# Patient Record
Sex: FEMALE | Race: BLACK OR AFRICAN AMERICAN | NOT HISPANIC OR LATINO | Employment: OTHER | URBAN - METROPOLITAN AREA
[De-identification: names, ages, dates, MRNs, and addresses within clinical notes are randomized per-mention and may not be internally consistent; named-entity substitution may affect disease eponyms.]

---

## 2016-03-14 LAB
PAP RECOMMENDATION EXT: NORMAL
PAP SMEAR: NORMAL

## 2017-01-17 ENCOUNTER — HISTORICAL (OUTPATIENT)
Dept: INTERNAL MEDICINE | Facility: CLINIC | Age: 57
End: 2017-01-17

## 2017-01-30 ENCOUNTER — HISTORICAL (OUTPATIENT)
Dept: RADIOLOGY | Facility: HOSPITAL | Age: 57
End: 2017-01-30

## 2017-03-23 ENCOUNTER — HISTORICAL (OUTPATIENT)
Dept: INTERNAL MEDICINE | Facility: CLINIC | Age: 57
End: 2017-03-23

## 2017-12-11 ENCOUNTER — HISTORICAL (OUTPATIENT)
Dept: INTERNAL MEDICINE | Facility: CLINIC | Age: 57
End: 2017-12-11

## 2017-12-11 LAB — DEPRECATED CALCIDIOL+CALCIFEROL SERPL-MC: 25.23 NG/ML (ref 30–80)

## 2017-12-22 ENCOUNTER — HISTORICAL (OUTPATIENT)
Dept: RADIOLOGY | Facility: HOSPITAL | Age: 57
End: 2017-12-22

## 2017-12-22 LAB
BUN SERPL-MCNC: 13 MG/DL (ref 7–18)
CALCIUM SERPL-MCNC: 9.5 MG/DL (ref 8.5–10.1)
CHLORIDE SERPL-SCNC: 104 MMOL/L (ref 98–107)
CO2 SERPL-SCNC: 30 MMOL/L (ref 21–32)
CREAT SERPL-MCNC: 1 MG/DL (ref 0.6–1.3)
GLUCOSE SERPL-MCNC: 95 MG/DL (ref 74–106)
POTASSIUM SERPL-SCNC: 4 MMOL/L (ref 3.5–5.1)
SODIUM SERPL-SCNC: 140 MMOL/L (ref 136–145)

## 2018-09-25 ENCOUNTER — HISTORICAL (OUTPATIENT)
Dept: RADIOLOGY | Facility: HOSPITAL | Age: 58
End: 2018-09-25

## 2018-09-25 LAB
CHOLEST SERPL-MCNC: 133 MG/DL
CHOLEST/HDLC SERPL: 2.9 {RATIO} (ref 0–4.4)
HDLC SERPL-MCNC: 46 MG/DL
LDLC SERPL CALC-MCNC: 67 MG/DL (ref 0–130)
TRIGL SERPL-MCNC: 99 MG/DL
VLDLC SERPL CALC-MCNC: 20 MG/DL

## 2018-10-01 ENCOUNTER — HISTORICAL (OUTPATIENT)
Dept: ADMINISTRATIVE | Facility: HOSPITAL | Age: 58
End: 2018-10-01

## 2018-10-01 LAB — FSH SERPL-ACNC: 78.2 MIU/ML

## 2018-10-03 ENCOUNTER — HISTORICAL (OUTPATIENT)
Dept: RADIOLOGY | Facility: HOSPITAL | Age: 58
End: 2018-10-03

## 2018-11-15 ENCOUNTER — HISTORICAL (OUTPATIENT)
Dept: RADIOLOGY | Facility: HOSPITAL | Age: 58
End: 2018-11-15

## 2018-12-03 ENCOUNTER — HISTORICAL (OUTPATIENT)
Dept: ADMINISTRATIVE | Facility: HOSPITAL | Age: 58
End: 2018-12-03

## 2018-12-03 LAB
ABS NEUT (OLG): 4.04 X10(3)/MCL (ref 2.1–9.2)
BASOPHILS # BLD AUTO: 0.04 X10(3)/MCL
BASOPHILS NFR BLD AUTO: 1 %
BUN SERPL-MCNC: 11 MG/DL (ref 7–18)
CALCIUM SERPL-MCNC: 9.2 MG/DL (ref 8.5–10.1)
CHLORIDE SERPL-SCNC: 105 MMOL/L (ref 98–107)
CO2 SERPL-SCNC: 32 MMOL/L (ref 21–32)
CREAT SERPL-MCNC: 0.9 MG/DL (ref 0.6–1.3)
CREAT/UREA NIT SERPL: 12
EOSINOPHIL # BLD AUTO: 0.1 10*3/UL
EOSINOPHIL NFR BLD AUTO: 2 %
ERYTHROCYTE [DISTWIDTH] IN BLOOD BY AUTOMATED COUNT: 13 % (ref 11.5–14.5)
GLUCOSE SERPL-MCNC: 96 MG/DL (ref 74–106)
HCT VFR BLD AUTO: 38.8 % (ref 35–46)
HGB BLD-MCNC: 13 GM/DL (ref 12–16)
IMM GRANULOCYTES # BLD AUTO: 0.02 10*3/UL
IMM GRANULOCYTES NFR BLD AUTO: 0 %
LYMPHOCYTES # BLD AUTO: 1.99 X10(3)/MCL
LYMPHOCYTES NFR BLD AUTO: 30 % (ref 13–40)
MCH RBC QN AUTO: 29 PG (ref 26–34)
MCHC RBC AUTO-ENTMCNC: 33.5 GM/DL (ref 31–37)
MCV RBC AUTO: 86.4 FL (ref 80–100)
MONOCYTES # BLD AUTO: 0.44 X10(3)/MCL
MONOCYTES NFR BLD AUTO: 7 % (ref 4–12)
NEUTROPHILS # BLD AUTO: 4.04 X10(3)/MCL
NEUTROPHILS NFR BLD AUTO: 61 X10(3)/MCL
PLATELET # BLD AUTO: 324 X10(3)/MCL (ref 130–400)
PMV BLD AUTO: 10.4 FL (ref 7.4–10.4)
POTASSIUM SERPL-SCNC: 4.5 MMOL/L (ref 3.5–5.1)
RBC # BLD AUTO: 4.49 X10(6)/MCL (ref 4–5.2)
SODIUM SERPL-SCNC: 140 MMOL/L (ref 136–145)
T4 FREE SERPL-MCNC: 0.81 NG/DL (ref 0.76–1.46)
TSH SERPL-ACNC: 11.6 MIU/L (ref 0.36–3.74)
WBC # SPEC AUTO: 6.6 X10(3)/MCL (ref 4.5–11)

## 2019-04-23 ENCOUNTER — HISTORICAL (OUTPATIENT)
Dept: ADMINISTRATIVE | Facility: HOSPITAL | Age: 59
End: 2019-04-23

## 2019-04-23 LAB
T4 FREE SERPL-MCNC: 1.53 NG/DL (ref 0.76–1.46)
TSH SERPL-ACNC: 0.14 MIU/L (ref 0.36–3.74)

## 2019-04-30 ENCOUNTER — HISTORICAL (OUTPATIENT)
Dept: RESPIRATORY THERAPY | Facility: HOSPITAL | Age: 59
End: 2019-04-30

## 2019-07-01 ENCOUNTER — HISTORICAL (OUTPATIENT)
Dept: ADMINISTRATIVE | Facility: HOSPITAL | Age: 59
End: 2019-07-01

## 2019-07-01 LAB
ABS NEUT (OLG): 4.45 X10(3)/MCL (ref 2.1–9.2)
ALBUMIN SERPL-MCNC: 3.2 GM/DL (ref 3.4–5)
ALBUMIN/GLOB SERPL: 0.6 RATIO (ref 1.1–2)
ALP SERPL-CCNC: 124 UNIT/L (ref 45–117)
ALT SERPL-CCNC: 27 UNIT/L (ref 12–78)
AST SERPL-CCNC: 22 UNIT/L (ref 15–37)
BASOPHILS # BLD AUTO: 0.06 X10(3)/MCL
BASOPHILS NFR BLD AUTO: 1 %
BILIRUB SERPL-MCNC: 0.4 MG/DL (ref 0.2–1)
BILIRUBIN DIRECT+TOT PNL SERPL-MCNC: 0.1 MG/DL
BILIRUBIN DIRECT+TOT PNL SERPL-MCNC: 0.3 MG/DL
BUN SERPL-MCNC: 17 MG/DL (ref 7–18)
CALCIUM SERPL-MCNC: 9.3 MG/DL (ref 8.5–10.1)
CHLORIDE SERPL-SCNC: 105 MMOL/L (ref 98–107)
CHOLEST SERPL-MCNC: 155 MG/DL
CHOLEST/HDLC SERPL: 3 {RATIO} (ref 0–4.4)
CO2 SERPL-SCNC: 29 MMOL/L (ref 21–32)
CREAT SERPL-MCNC: 0.9 MG/DL (ref 0.6–1.3)
DEPRECATED CALCIDIOL+CALCIFEROL SERPL-MC: 30.93 NG/ML (ref 30–80)
EOSINOPHIL # BLD AUTO: 0.07 X10(3)/MCL
EOSINOPHIL NFR BLD AUTO: 1 %
ERYTHROCYTE [DISTWIDTH] IN BLOOD BY AUTOMATED COUNT: 13.6 % (ref 11.5–14.5)
EST. AVERAGE GLUCOSE BLD GHB EST-MCNC: 103 MG/DL
GLOBULIN SER-MCNC: 5.1 GM/ML (ref 2.3–3.5)
GLUCOSE SERPL-MCNC: 91 MG/DL (ref 74–106)
HBA1C MFR BLD: 5.2 % (ref 4.2–6.3)
HCT VFR BLD AUTO: 39.1 % (ref 35–46)
HDLC SERPL-MCNC: 52 MG/DL
HGB BLD-MCNC: 12.5 GM/DL (ref 12–16)
IMM GRANULOCYTES # BLD AUTO: 0.03 10*3/UL
IMM GRANULOCYTES NFR BLD AUTO: 0 %
LDLC SERPL CALC-MCNC: 80 MG/DL (ref 0–130)
LYMPHOCYTES # BLD AUTO: 1.86 X10(3)/MCL
LYMPHOCYTES NFR BLD AUTO: 26 % (ref 13–40)
MCH RBC QN AUTO: 27.7 PG (ref 26–34)
MCHC RBC AUTO-ENTMCNC: 32 GM/DL (ref 31–37)
MCV RBC AUTO: 86.7 FL (ref 80–100)
MONOCYTES # BLD AUTO: 0.55 X10(3)/MCL
MONOCYTES NFR BLD AUTO: 8 % (ref 4–12)
NEUTROPHILS # BLD AUTO: 4.45 X10(3)/MCL
NEUTROPHILS NFR BLD AUTO: 63 X10(3)/MCL
PLATELET # BLD AUTO: 317 X10(3)/MCL (ref 130–400)
PMV BLD AUTO: 10.7 FL (ref 7.4–10.4)
POTASSIUM SERPL-SCNC: 3.8 MMOL/L (ref 3.5–5.1)
PROT SERPL-MCNC: 8.3 GM/DL (ref 6.4–8.2)
RBC # BLD AUTO: 4.51 X10(6)/MCL (ref 4–5.2)
SODIUM SERPL-SCNC: 139 MMOL/L (ref 136–145)
T4 FREE SERPL-MCNC: 1.05 NG/DL (ref 0.76–1.46)
TRIGL SERPL-MCNC: 113 MG/DL
TSH SERPL-ACNC: 0.6 MIU/L (ref 0.36–3.74)
VLDLC SERPL CALC-MCNC: 23 MG/DL
WBC # SPEC AUTO: 7 X10(3)/MCL (ref 4.5–11)

## 2019-08-01 ENCOUNTER — HISTORICAL (OUTPATIENT)
Dept: ADMINISTRATIVE | Facility: HOSPITAL | Age: 59
End: 2019-08-01

## 2019-08-01 LAB
HAV IGM SERPL QL IA: NONREACTIVE
HBV CORE IGM SERPL QL IA: NONREACTIVE
HBV SURFACE AG SERPL QL IA: NEGATIVE
HCV AB SERPL QL IA: NONREACTIVE
HIV 1+2 AB+HIV1 P24 AG SERPL QL IA: NONREACTIVE
T PALLIDUM AB SER QL: NONREACTIVE

## 2019-09-12 ENCOUNTER — HISTORICAL (OUTPATIENT)
Dept: SURGERY | Facility: HOSPITAL | Age: 59
End: 2019-09-12

## 2019-11-18 ENCOUNTER — HISTORICAL (OUTPATIENT)
Dept: ADMINISTRATIVE | Facility: HOSPITAL | Age: 59
End: 2019-11-18

## 2019-11-18 LAB
ABS NEUT (OLG): 4.69 X10(3)/MCL (ref 2.1–9.2)
BASOPHILS # BLD AUTO: 0 X10(3)/MCL (ref 0–0.2)
BASOPHILS NFR BLD AUTO: 0 %
EOSINOPHIL # BLD AUTO: 0.2 X10(3)/MCL (ref 0–0.9)
EOSINOPHIL NFR BLD AUTO: 2 %
ERYTHROCYTE [DISTWIDTH] IN BLOOD BY AUTOMATED COUNT: 13.3 % (ref 11.5–14.5)
ERYTHROCYTE [SEDIMENTATION RATE] IN BLOOD: 54 MM/HR (ref 0–20)
HCT VFR BLD AUTO: 36.4 % (ref 35–46)
HGB BLD-MCNC: 11.6 GM/DL (ref 12–16)
IMM GRANULOCYTES # BLD AUTO: 0.03 10*3/UL
IMM GRANULOCYTES NFR BLD AUTO: 0 %
LYMPHOCYTES # BLD AUTO: 2 X10(3)/MCL (ref 0.6–4.6)
LYMPHOCYTES NFR BLD AUTO: 26 %
MCH RBC QN AUTO: 28.1 PG (ref 26–34)
MCHC RBC AUTO-ENTMCNC: 31.9 GM/DL (ref 31–37)
MCV RBC AUTO: 88.1 FL (ref 80–100)
MONOCYTES # BLD AUTO: 0.6 X10(3)/MCL (ref 0.1–1.3)
MONOCYTES NFR BLD AUTO: 8 %
NEUTROPHILS # BLD AUTO: 4.69 X10(3)/MCL (ref 2.1–9.2)
NEUTROPHILS NFR BLD AUTO: 62 %
PLATELET # BLD AUTO: 350 X10(3)/MCL (ref 130–400)
PMV BLD AUTO: 10.8 FL (ref 7.4–10.4)
RBC # BLD AUTO: 4.13 X10(6)/MCL (ref 4–5.2)
WBC # SPEC AUTO: 7.5 X10(3)/MCL (ref 4.5–11)

## 2019-12-09 ENCOUNTER — HISTORICAL (OUTPATIENT)
Dept: RADIOLOGY | Facility: HOSPITAL | Age: 59
End: 2019-12-09

## 2020-01-08 ENCOUNTER — HISTORICAL (OUTPATIENT)
Dept: ENDOSCOPY | Facility: HOSPITAL | Age: 60
End: 2020-01-08

## 2020-06-02 ENCOUNTER — HISTORICAL (OUTPATIENT)
Dept: RADIOLOGY | Facility: HOSPITAL | Age: 60
End: 2020-06-02

## 2020-08-07 ENCOUNTER — HISTORICAL (OUTPATIENT)
Dept: RADIOLOGY | Facility: HOSPITAL | Age: 60
End: 2020-08-07

## 2020-11-02 ENCOUNTER — HISTORICAL (OUTPATIENT)
Dept: RADIOLOGY | Facility: HOSPITAL | Age: 60
End: 2020-11-02

## 2020-11-09 ENCOUNTER — HISTORICAL (OUTPATIENT)
Dept: ADMINISTRATIVE | Facility: HOSPITAL | Age: 60
End: 2020-11-09

## 2021-05-26 ENCOUNTER — HISTORICAL (OUTPATIENT)
Dept: ADMINISTRATIVE | Facility: HOSPITAL | Age: 61
End: 2021-05-26

## 2021-05-26 LAB
ABS NEUT (OLG): 4.29 X10(3)/MCL (ref 2.1–9.2)
ALBUMIN SERPL-MCNC: 3.4 GM/DL (ref 3.4–4.8)
ALBUMIN/GLOB SERPL: 0.9 RATIO (ref 1.1–2)
ALP SERPL-CCNC: 114 UNIT/L (ref 40–150)
ALT SERPL-CCNC: 12 UNIT/L (ref 0–55)
APPEARANCE, UA: CLEAR
AST SERPL-CCNC: 15 UNIT/L (ref 5–34)
BACTERIA SPEC CULT: NORMAL /HPF
BASOPHILS # BLD AUTO: 0.1 X10(3)/MCL (ref 0–0.2)
BASOPHILS NFR BLD AUTO: 1 %
BILIRUB SERPL-MCNC: 0.4 MG/DL
BILIRUB UR QL STRIP: NEGATIVE
BILIRUBIN DIRECT+TOT PNL SERPL-MCNC: 0.2 MG/DL (ref 0–0.5)
BILIRUBIN DIRECT+TOT PNL SERPL-MCNC: 0.2 MG/DL (ref 0–0.8)
BUN SERPL-MCNC: 9.7 MG/DL (ref 9.8–20.1)
CALCIUM SERPL-MCNC: 9.4 MG/DL (ref 8.4–10.2)
CHLORIDE SERPL-SCNC: 106 MMOL/L (ref 98–107)
CO2 SERPL-SCNC: 26 MMOL/L (ref 23–31)
COLOR UR: YELLOW
CREAT SERPL-MCNC: 0.79 MG/DL (ref 0.55–1.02)
CRP SERPL HS-MCNC: 2.51 MG/DL
DEPRECATED CALCIDIOL+CALCIFEROL SERPL-MC: 45.5 NG/ML (ref 30–80)
EOSINOPHIL # BLD AUTO: 0.1 X10(3)/MCL (ref 0–0.9)
EOSINOPHIL NFR BLD AUTO: 1 %
ERYTHROCYTE [DISTWIDTH] IN BLOOD BY AUTOMATED COUNT: 13.7 % (ref 11.5–17)
ERYTHROCYTE [SEDIMENTATION RATE] IN BLOOD: 44 MM/HR (ref 0–20)
GLOBULIN SER-MCNC: 3.7 GM/DL (ref 2.4–3.5)
GLUCOSE (UA): NEGATIVE
GLUCOSE SERPL-MCNC: 93 MG/DL (ref 82–115)
HBV SURFACE AG SERPL QL IA: NONREACTIVE
HCT VFR BLD AUTO: 38.5 % (ref 37–47)
HCV AB SERPL QL IA: NONREACTIVE
HGB BLD-MCNC: 12.7 GM/DL (ref 12–16)
HGB UR QL STRIP: NEGATIVE
KETONES UR QL STRIP: NEGATIVE
LEUKOCYTE ESTERASE UR QL STRIP: NEGATIVE
LYMPHOCYTES # BLD AUTO: 1.6 X10(3)/MCL (ref 0.6–4.6)
LYMPHOCYTES NFR BLD AUTO: 25 %
MCH RBC QN AUTO: 28.6 PG (ref 27–31)
MCHC RBC AUTO-ENTMCNC: 33 GM/DL (ref 33–36)
MCV RBC AUTO: 86.7 FL (ref 80–94)
MONOCYTES # BLD AUTO: 0.4 X10(3)/MCL (ref 0.1–1.3)
MONOCYTES NFR BLD AUTO: 7 %
NEUTROPHILS # BLD AUTO: 4.29 X10(3)/MCL (ref 2.1–9.2)
NEUTROPHILS NFR BLD AUTO: 65 %
NITRITE UR QL STRIP: NEGATIVE
PH UR STRIP: 7 [PH] (ref 5–9)
PLATELET # BLD AUTO: 343 X10(3)/MCL (ref 130–400)
PMV BLD AUTO: 10.8 FL (ref 9.4–12.4)
POTASSIUM SERPL-SCNC: 4.3 MMOL/L (ref 3.5–5.1)
PROT SERPL-MCNC: 7.1 GM/DL (ref 5.8–7.6)
PROT UR QL STRIP: NEGATIVE
RBC # BLD AUTO: 4.44 X10(6)/MCL (ref 4.2–5.4)
RBC #/AREA URNS HPF: NORMAL /[HPF]
RHEUMATOID FACT SERPL-ACNC: 68 IU/ML
SODIUM SERPL-SCNC: 143 MMOL/L (ref 136–145)
SP GR UR STRIP: 1.02 (ref 1–1.03)
SQUAMOUS EPITHELIAL, UA: NORMAL /HPF (ref 0–4)
T4 FREE SERPL-MCNC: 0.83 NG/DL (ref 0.7–1.48)
TSH SERPL-ACNC: 6.07 UIU/ML (ref 0.35–4.94)
UROBILINOGEN UR STRIP-ACNC: 0.2
WBC # SPEC AUTO: 6.6 X10(3)/MCL (ref 4.5–11.5)
WBC #/AREA URNS HPF: NORMAL /[HPF]

## 2021-09-01 ENCOUNTER — HISTORICAL (OUTPATIENT)
Dept: INTERNAL MEDICINE | Facility: CLINIC | Age: 61
End: 2021-09-01

## 2021-09-01 LAB — HEMOCCULT STL QL IA: NEGATIVE

## 2022-03-29 PROBLEM — K02.9 CARIES: Status: ACTIVE | Noted: 2022-03-29

## 2022-04-07 ENCOUNTER — HISTORICAL (OUTPATIENT)
Dept: INTERNAL MEDICINE | Facility: CLINIC | Age: 62
End: 2022-04-07

## 2022-04-07 LAB
ABS NEUT (OLG): 3.03 (ref 2.1–9.2)
ALBUMIN SERPL-MCNC: 3.5 G/DL (ref 3.4–4.8)
ALBUMIN/GLOB SERPL: 0.8 {RATIO} (ref 1.1–2)
ALP SERPL-CCNC: 102 U/L (ref 40–150)
ALT SERPL-CCNC: 14 U/L (ref 0–55)
APPEARANCE, UA: CLEAR
AST SERPL-CCNC: 21 U/L (ref 5–34)
BACTERIA SPEC CULT: NORMAL
BASOPHILS # BLD AUTO: 0.1 10*3/UL (ref 0–0.2)
BASOPHILS NFR BLD AUTO: 1 %
BILIRUB SERPL-MCNC: 0.4 MG/DL
BILIRUB UR QL STRIP: NEGATIVE
BILIRUBIN DIRECT+TOT PNL SERPL-MCNC: 0.2 (ref 0–0.5)
BILIRUBIN DIRECT+TOT PNL SERPL-MCNC: 0.2 (ref 0–0.8)
BUN SERPL-MCNC: 9.8 MG/DL (ref 9.8–20.1)
CALCIUM SERPL-MCNC: 10.3 MG/DL (ref 8.7–10.5)
CHLORIDE SERPL-SCNC: 105 MMOL/L (ref 98–107)
CHOLEST SERPL-MCNC: 146 MG/DL
CHOLEST/HDLC SERPL: 3 {RATIO} (ref 0–5)
CO2 SERPL-SCNC: 29 MMOL/L (ref 23–31)
COLOR UR: NORMAL
CREAT SERPL-MCNC: 0.85 MG/DL (ref 0.55–1.02)
DEPRECATED CALCIDIOL+CALCIFEROL SERPL-MC: 37.3 NG/ML (ref 30–80)
EOSINOPHIL # BLD AUTO: 0.1 10*3/UL (ref 0–0.9)
EOSINOPHIL NFR BLD AUTO: 1 %
ERYTHROCYTE [DISTWIDTH] IN BLOOD BY AUTOMATED COUNT: 14.1 % (ref 11.5–14.5)
EST. AVERAGE GLUCOSE BLD GHB EST-MCNC: 91.1 MG/DL
FLAG2 (OHS): 70
FLAG3 (OHS): 80
FLAGS (OHS): 80
GLOBULIN SER-MCNC: 4.2 G/DL (ref 2.4–3.5)
GLUCOSE (UA): NORMAL
GLUCOSE SERPL-MCNC: 76 MG/DL (ref 82–115)
HBA1C MFR BLD: 4.8 %
HCT VFR BLD AUTO: 37.9 % (ref 35–46)
HDLC SERPL-MCNC: 53 MG/DL (ref 35–60)
HEMOLYSIS INTERF INDEX SERPL-ACNC: 3
HGB BLD-MCNC: 12.3 G/DL (ref 12–16)
HGB UR QL STRIP: NEGATIVE
HYALINE CASTS #/AREA URNS LPF: NORMAL /[LPF]
ICTERIC INTERF INDEX SERPL-ACNC: 0
IMM GRANULOCYTES # BLD AUTO: 0.01 10*3/UL
IMM GRANULOCYTES NFR BLD AUTO: 0 %
KETONES UR QL STRIP: NEGATIVE
LDLC SERPL CALC-MCNC: 70 MG/DL (ref 50–140)
LEUKOCYTE ESTERASE UR QL STRIP: 25
LIPEMIC INTERF INDEX SERPL-ACNC: 11
LOW EVENT # SUSPECT FLAG (OHS): 80
LYMPHOCYTES # BLD AUTO: 2.4 10*3/UL (ref 0.6–4.6)
LYMPHOCYTES NFR BLD AUTO: 40 %
MANUAL DIFF? (OHS): NO
MCH RBC QN AUTO: 28.9 PG (ref 26–34)
MCHC RBC AUTO-ENTMCNC: 32.5 G/DL (ref 31–37)
MCV RBC AUTO: 89.2 FL (ref 80–100)
MO BLASTS SUSPECT FLAG (OHS): 40
MONOCYTES # BLD AUTO: 0.4 10*3/UL (ref 0.1–1.3)
MONOCYTES NFR BLD AUTO: 7 %
NEUTROPHILS # BLD AUTO: 3.03 10*3/UL (ref 2.1–9.2)
NEUTROPHILS NFR BLD AUTO: 51 %
NITRITE UR QL STRIP: NEGATIVE
NRBC BLD AUTO-RTO: 0 % (ref 0–0.2)
PH UR STRIP: 7 [PH] (ref 4.5–8)
PLATELET # BLD AUTO: 381 10*3/UL (ref 130–400)
PLATELET CLUMPS SUSPECT FLAG (OHS): 20
PMV BLD AUTO: 10.8 FL (ref 7.4–10.4)
POTASSIUM SERPL-SCNC: 4.4 MMOL/L (ref 3.5–5.1)
PROT SERPL-MCNC: 7.7 G/DL (ref 5.8–7.6)
PROT UR QL STRIP: NEGATIVE
RBC # BLD AUTO: 4.25 10*6/UL (ref 4–5.2)
RBC #/AREA URNS HPF: NORMAL /[HPF] (ref 0–5)
SODIUM SERPL-SCNC: 141 MMOL/L (ref 136–145)
SP GR UR STRIP: 1.01 (ref 1–1.03)
SQUAMOUS EPITHELIAL, UA: NORMAL
T4 FREE SERPL-MCNC: 1.02 NG/DL (ref 0.7–1.48)
TRIGL SERPL-MCNC: 114 MG/DL (ref 37–140)
TSH SERPL-ACNC: 2.59 M[IU]/L (ref 0.35–4.94)
UROBILINOGEN UR STRIP-ACNC: NORMAL
VLDLC SERPL CALC-MCNC: 23 MG/DL
WBC # SPEC AUTO: 5.9 10*3/UL (ref 4.5–11)
WBC #/AREA URNS HPF: NORMAL /[HPF] (ref 0–5)

## 2022-04-11 ENCOUNTER — HISTORICAL (OUTPATIENT)
Dept: ADMINISTRATIVE | Facility: HOSPITAL | Age: 62
End: 2022-04-11
Payer: MEDICARE

## 2022-04-24 VITALS
DIASTOLIC BLOOD PRESSURE: 110 MMHG | OXYGEN SATURATION: 97 % | SYSTOLIC BLOOD PRESSURE: 158 MMHG | BODY MASS INDEX: 44.71 KG/M2 | WEIGHT: 242.94 LBS | HEIGHT: 62 IN

## 2022-04-30 NOTE — PROGRESS NOTES
Patient:   Mary Lou Perez             MRN: [id number]            FIN: [id number]               Age:   58 years     Sex:  Female     :  1960   Associated Diagnoses:   None   Author:   Elzbieta Lares MD      Pre-Op Dx:  58 F with PMB        Plan:  [facility] D&C    Reviewed resident's H&P.  Agree with plan.  Proceed with case

## 2022-05-04 NOTE — HISTORICAL [facility] CERNER
This is a historical note converted from [employer]. Formatting and pictures may have been removed.  Please reference [employer]ner for original formatting and attached multimedia. Indication for Surgery  Post-menopausal bleeding, thickened endometrial stripe  Preoperative Diagnosis  Post-menopausal bleeding  Postoperative Diagnosis  Post-menopausal bleeding  S/p hysteroscopy D&C  Operation  Hysteroscopy D&C  Surgeon(s)  Meaghan Mancuso MD  ?  Attending:  Elzbieta Lares MD  Assistant  Ivana Ma MD  Anesthesia  General endotracheal  Estimated Blood Loss  5 mL  Urine Output  30 mL  ?  Fluid Deficit:  60 mL  ?  IV fluids:  400 mL  Findings  EUA: NEFG, vaginal mucosa pink, moist, multiparous cervix visualized without lesion; 8 cm, anteverted uterus, mobile, excellent descent and vaginal capacity  Intraop: normal appearing uterine cavity with bilateral tubal ostia visualized, endometrium atrophic in appearance with no visible polyps  Specimen(s)  Endometrial curettings  Complications  None  Technique  The patient was taken to the operating room with IVF running. SCDs were placed on bilateral lower extremities for DVT prophylaxis.? General anesthesia was obtained without difficulty and found to be adequate.? She was placed in the dorsal lithotomy position with legs in Hernandez type stirrups.? Exam under anesthesia revealed the findings as above. She was prepped and draped in the normal sterile fashion. A straight catheter was placed to drain the bladder. The cervix was visualized and the anterior lip of the cervix was grasped with a single-toothed tenaculum.? The cervix was sequentially dilated to?6 mm?using Hegar dilators. A?6 mm, 30 degree?hysteroscope?(Myosure) was introduced into the cervix with hydrodistension. The above findings were then noted. Photographs were taken of the endometrial cavity.The hysteroscope was then removed. The uterus was curetted in a clockwise fashion. The endometrial scrapings were sent to  pathology. The tenaculum was removed from the cervix and good hemostasis was noted at puncture sites.??  ?  The patient tolerated the procedure well. The instrument and sponge counts were correct times two. The patient awaked from anesthesia and was taken to recovery in stable condition.  ?  ?Luda was present for the entire procedure.   I was present with the resident during all critical and key portions of the procedure and agree with the findings documented in the residents note.

## 2022-05-04 NOTE — HISTORICAL [facility] CERNER
This is a historical note converted from [employer]. Formatting and pictures may have been removed.  Please reference [employer] for original formatting and attached multimedia. Chief Complaint  Follow up, medication management/refills  History of Present Illness  This is a 57-year-old -American female with a past medical history of Graves disease status post right lobe thyroidectomy, thymoma resection, hypertension, chronic cough presenting to clinic today for follow-up appointment. ?Patient is still complaining of a chronic cough going on for the past year and a half, is nonproductive, but she does endorse some nighttime wheezing, as well as snoring, is been having to use her inhaler more frequently. ?On PPI therapy at this time with omeprazole, but denies any dysphagia, or does admit some nausea. ?She, being followed by ENT, and has an appointment this Thursday on 4/25/19 for FNA of left thyroid nodule. ?At the time of last saw the patient, she was not taking her Synthroid daily, and thus her TSH was elevated. ?He did not follow-up with a repeat TSH.? She was also supposed to go a?hysteroscopy and D&C?per GYN, she was discovered have a thickened endometrium?on ultrasound?last year, but this procedure was postponed?until her thyroid levels normalize, she doesnt have an appointment with GYN until October.? Denies any recent vaginal bleeding, spotting, does endorse?chronic pelvic pain.  ?  Review of Systems  14 point review of systems negative except for pertinent positives per HPI  Physical Exam  Vitals & Measurements  T:?36.8? ?C (Oral)? HR:?85(Peripheral)? RR:?18? BP:?125/85?  HT:?157.48?cm? WT:?104.5?kg? BMI:?42.14?  General: ?Alert and oriented, No acute distress. ?  Eye: ?Pupils are equal, round and reactive to light, Extraocular movements are intact. ?  HEENT:?Poor dentition,?evidence of?what looks like chronic gum disease in the lower, ?Moist oral mucosa, no pharyngeal exudates Supple, Non-tender, No carotid  bruit, No jugular venous distention, No lymphadenopathy, palpable left?thyroid nodule; Negative hepatojugular reflux. ?  Respiratory: ?Lungs are clear to auscultation, Respirations are non-labored, Breath sounds are equal, Symmetrical chest wall expansion, No chest wall tenderness. ?  Cardiovascular: ?Normal rate, Regular rhythm, No murmur, No gallop, Good pulses equal in all extremities, Normal peripheral perfusion, No edema. ?  Gastrointestinal: ?Soft, Non-tender, Non-distended, Normal bowel sounds, No organomegaly. ?  Genitourinary: ?No costovertebral angle tenderness. ?  Musculoskeletal: ?Normal range of motion, Normal strength, No tenderness, No swelling, No deformity, Normal gait. ?  Integumentary: ?Warm, No rash. ?  Neurologic: ?Alert, Oriented, Normal sensory, Normal motor function, No focal deficits, Cranial Nerves II-XII are grossly intact.  Cognition and Speech: ?Oriented, Speech clear and coherent. ?  Psychiatric: ?Cooperative, Appropriate mood & affect. ?  Assessment/Plan  Chronic cough?R05  ? This point, working diagnosis of GERD versus asthma.  Shes been taking albuterol at night, she endorsed some wheezing, but also endorses?nonproductive cough  Have attempted to refer patient for sleep study?multiple times, but has been denied by her insurance  This point, will reattempt?PFTs, and referral to sleep lab  Will?give a trial of Nexium at this time, but if she fails this after 4-8 weeks, need to consider referral?to GI for EGD  Ordered:  Alpha-1-Antitrypsin Serum-[facility] [id number], Now collect, 04/23/19 10:05:00 CDT, Blood, Order for future visit, Stop date 04/23/19 10:05:00 CDT, Lab Collect, Chronic cough, 04/23/19 10:05:00 CDT  Clinic Follow up, *Est. 07/23/19 3:00:00 CDT, Order for future visit, Subclinical hypothyroidism  Hypertension  H/O thymoma  Thickened endometrium  H/O Graves disease  Nodule of left lobe of thyroid gland  Chronic cough, [facility]  Complete Pulmonary Function Test,  04/23/19 10:04:00 CDT, Order for future visit, Chronic cough, [facility]  Immunoglobulin E, Total-[facility] [id number], Now collect, 04/23/19 10:04:00 CDT, Blood, Order for future visit, Stop date 04/23/19 10:04:00 CDT, Lab Collect, Chronic cough, 04/23/19 10:04:00 CDT  Internal Referral to Gastroenterology, refractory GERD, *Est. 04/23/19 3:00:00 CDT, Future Visit?, Chronic cough  Internal Referral to Sleep Lab, fatigue, snoring, 04/23/19 10:04:00 CDT, Chronic cough  XR Chest 2 Views, Routine, *Est. 04/24/19 3:00:00 CDT, Cough, None, Ambulatory, Rad Type, Order for future visit, Chronic cough, Not Scheduled, *Est. 04/24/19 3:00:00 CDT  ?  H/O Graves disease?Z86.39  ? Remains compliant with her Synthroid since her last appointment with ENT  To recheck TSH and free T4 levels today, before her appointment with ENT  Ordered:  Clinic Follow up, *Est. 07/23/19 3:00:00 CDT, Order for future visit, Subclinical hypothyroidism  Hypertension  H/O thymoma  Thickened endometrium  H/O Graves disease  Nodule of left lobe of thyroid gland  Chronic cough, [facility]  ?  Hypertension?I10  ? Martha with current regimen of hydrochlorothiazide-triamterene  Ordered:  Clinic Follow up, *Est. 07/23/19 3:00:00 CDT, Order for future visit, Subclinical hypothyroidism  Hypertension  H/O thymoma  Thickened endometrium  H/O Graves disease  Nodule of left lobe of thyroid gland  Chronic cough, [facility]  ?  Nodule of left lobe of thyroid gland?E04.1  ? Scheduled for FNA?this Thursday, 4/25/19 with ENT  Ordered:  Clinic Follow up, *Est. 07/23/19 3:00:00 CDT, Order for future visit, Subclinical hypothyroidism  Hypertension  H/O thymoma  Thickened endometrium  H/O Graves disease  Nodule of left lobe of thyroid gland  Chronic cough, [facility]  ?  Subclinical hypothyroidism?E03.9  ? See above for Graves disease,  Ordered:  Clinic Follow up, *Est. 07/23/19 3:00:00 CDT, Order for future visit, Subclinical  hypothyroidism  Hypertension  H/O thymoma  Thickened endometrium  H/O Graves disease  Nodule of left lobe of thyroid gland  Chronic cough, [facility]  Free T4, Stat collect, 04/23/19 10:26:00 CDT, Blood, Order for future visit, Stop date 04/23/19 10:26:00 CDT, Lab Collect, Subclinical hypothyroidism, 04/23/19 10:26:00 CDT  Thyroid Stimulating Hormone, Stat collect, 04/23/19 10:26:00 CDT, Blood, Order for future visit, Stop date 04/23/19 10:26:00 CDT, Lab Collect, Subclinical hypothyroidism, 04/23/19 10:26:00 CDT  ?  Thickened endometrium?R93.89  ? Has follow-up in October with GYN  Pending hysteroscopy/D&C  Ordered:  Clinic Follow up, *Est. 07/23/19 3:00:00 CDT, Order for future visit, Subclinical hypothyroidism  Hypertension  H/O thymoma  Thickened endometrium  H/O Graves disease  Nodule of left lobe of thyroid gland  Chronic cough, [facility]  ?   Problem List/Past Medical History  Ongoing  Graves disease  Morbid obesity  Historical  No qualifying data  Procedure/Surgical History  Breast biopsy and related procedures  Gastroesophagoscopy via gastrotomy  Heart block  Thyroidectomy   Medications  albuterol 2.5 mg/3 mL (0.083%) inhalation solution, 2.5 mg= 3 mL, INH, q6hr, PRN, 3 refills  aspirin 81 mg oral tablet, 81 mg= 1 tab(s), Oral, BID, 3 refills,? ?Still taking, not as prescribed: taking once a day prn  Flonase 50 mcg/inh nasal spray, 1 spray(s), Nasal, BID, 4 refills  loratadine 5 mg oral tablet, disintegrating, 5 mg= 1 tab(s), Oral, Daily, 5 refills  MiraLax oral powder for reconstitution, 17 gm, Oral, BID,? ?Not taking  Nebulizer, See Instructions  omeprazole 40 mg oral DR capsule, 40 mg= 1 cap(s), Oral, Daily, 3 refills  Synthroid 137 mcg (0.137 mg) oral tablet, 137 mcg= 1 tab(s), Oral, Daily, 4 refills  Ventolin HFA 90 mcg/inh inhalation aerosol, 2 puff(s), INH, q4hr, PRN  Vitamin D 1,000 Units Tab, 1 tab(s), Oral, Daily  Zofran 4 mg oral tablet, 4 mg= 1 tab(s), Oral, Once  Allergies  No  Known Medication Allergies  Social History  Alcohol - Denies Alcohol Use, 05/20/2015  Never, 09/16/2015  Employment/School  disability, Work/School description: disabled. Highest education level: High school. Operates hazardous equipment: No., 03/15/2017  Exercise  Self assessment: Good condition., 09/28/2016  Home/Environment  Lives with Spouse. Living situation: Home/Independent. Alcohol abuse in household: No. Substance abuse in household: No. Smoker in household: No. Injuries/Abuse/Neglect in household: No. Feels unsafe at home: No. Family/Friends available for support: Yes. Concern for family members at home: No. Major illness in household: No. Financial concerns: No. TV/Computer concerns: No., 03/15/2017  Nutrition/Health  Regular, Wants to lose weight: Yes. Sleeping concerns: Yes. Feels highly stressed: No., 09/28/2016  Sexual  Sexual orientation: Straight or heterosexual. Gender Identity Identifies as female., 01/22/2019  Sexually active: Yes. Sexually active at age 17 Years. Number of current partners 1. Number of lifetime partners 4. Uses condoms: Yes. History of sexual abuse: No., 10/01/2018  Substance Abuse  Never, 09/16/2015  Tobacco - Denies Tobacco Use, 05/20/2015  Never (less than 100 in lifetime), N/A, 04/23/2019  Family History  Breast cancer: Mother.  Diabetes mellitus type 2: Mother and Daughter.  Esophageal cancer: Father.  Hypertension.: Mother.  Immunizations  Vaccine Date Status   influenza virus vaccine, inactivated 12/03/2018 Given   pneumococcal 23-polyvalent vaccine 11/08/2016 Given   influenza virus vaccine, inactivated 11/08/2016 Given   Health Maintenance  Health Maintenance  ???Pending?(in the next year)  ??? ??OverDue  ??? ? ? ?Diabetes Screening due??and every?  ??? ? ? ?Tetanus Vaccine due??09/28/18??and every 10??year(s)  ??? ? ? ?Aspirin Therapy for CVD Prevention due??12/11/18??and every 1??year(s)  ??? ? ? ?Alcohol Misuse Screening due??12/11/18??and every 1??year(s)  ???  ??Due?  ??? ? ? ?Cervical Cancer Screening due??03/15/19??and every 3??year(s)  ??? ??Due In Future?  ??? ? ? ?Colorectal Screening not due until??11/15/19??and every 1??year(s)  ??? ? ? ?Hypertension Management-BMP not due until??12/03/19??and every 1??year(s)  ??? ? ? ?Blood Pressure Screening not due until??04/22/20??and every 1??year(s)  ??? ? ? ?Body Mass Index Check not due until??04/22/20??and every 1??year(s)  ??? ? ? ?Hypertension Management-Blood Pressure not due until??04/22/20??and every 1??year(s)  ???Satisfied?(in the past 1 year)  ??? ??Satisfied?  ??? ? ? ?ADL Screening on??04/23/19.??Satisfied by Bartolome CAI, Yudy  ??? ? ? ?Asthma Management-Asthma Medication Prescribed on??07/18/18.??Satisfied by French Cruz DO  ??? ? ? ?Blood Pressure Screening on??04/23/19.??Satisfied by Bartolome CAI, Yudy  ??? ? ? ?Body Mass Index Check on??04/23/19.??Satisfied by Bartolome CAI, Yudy  ??? ? ? ?Breast Cancer Screening on??10/03/18.??Satisfied by Pooja Bone  ??? ? ? ?Colorectal Screening on??11/15/18.??Satisfied by Carlos Sofia  ??? ? ? ?Depression Screening on??12/05/18.??Satisfied by Britni Rivas LPN  ??? ? ? ?Diabetes Screening on??12/03/18.??Satisfied by Luci Milton  ??? ? ? ?Hypertension Management-Blood Pressure on??04/23/19.??Satisfied by Bartolome CAI, Yudy  ??? ? ? ?Influenza Vaccine on??12/03/18.??Satisfied by Yudy Mancuso LPN  ??? ? ? ?Lipid Screening on??09/25/18.??Satisfied by Farida Palma  ??? ? ? ?Obesity Screening on??04/23/19.??Satisfied by Yudy Mancuso LPN  ?  ?      I have reviewed the patients history, residents? findings on physical examination, diagnosis and treatment plan. Care provided was reasonable and necessary.   Spoke with patient over the phone about her thyroid studies that?came back yesterday.? She had a baseline TSH of greater than 11?back in December, with a normal free T4?however,?she had been noncompliant with  her Synthroid at that time. since starting back on the Synthroid, her thyroid studies from yesterday showed?a? low TSH?of?0.14, and a high free T4 of 1.53.? These are consistent with a hyperthyroid state, she had been endorsing some palpitations as well as diarrhea?and tremors.? Spoke with our endocrinologist, Dr. Clive Gonzalez, and recommendation for now to decrease Synthroid to 125 mcg, from her baseline 137 mcg.? In the meantime, she will see?ENT tomorrow for FNA?of left thyroid nodule.? From there,?well determine?any further?interventions/treatment for her?thyroid.? In the meantime, informed patient of these updates, she verbalized understanding.? Also spoke with patient at length about her?previous diagnosis of?Graves disease.? Told me, that she was diagnosed with?Graves disease?back in 1997 by her?then family doctor, Dr. Casandra Preciado in [place].? She then had a?right-sided thyroidectomy performed by a Dr. Luiz Chaudhry.? She also states that she been seeing a cardiologist for what appears to be atrial fibrillation at the time she was experiencing Graves disease, as she states was on a blood thinner, cannot recall the name of the anticoagulant.? She also states that she had been on a medicine for?rate control, but she states she knows for sure it was a beta blocker.? Well attempt to acquire old records?from the patients previous?physicians?to get a better picture of her?prior interventions

## 2022-05-04 NOTE — HISTORICAL [facility]
This is a historical note converted from [place]. Formatting and pictures may have been removed.  Please reference [place] for original formatting and attached multimedia. ?  Patient has been examined this morning and there are no updates to her H&P.  She is feeling well today without any complaints.  Presents today with her  Daniel, # [phone]  ?   PE:  Vitals: BP ; HR ; RR ; O2  General: NAD, A/Ox3. Well appearing.  Respiratory: normal work of breathing  Cardiovascular: RRR  Abdomen: soft, nondistended, nontender to palpation  Extremities: no edema, no calf tenderness  ?   Plan: To OR for scheduled [facility] D[facility].  ?   Ivana Ma MD  [facility], PGY-3  ?  ?  Previous H&P, :  History of Present Illness  Patient is a 57 yo  with hx of PMB and thickened endometrium who presents for preop appt for hysteroscopy D&C. Of note, patient had an in office endocervical polypectomy last year but was noted to have persistent PMB afterwards. During her workup, pelvic US demonstrated EMS of 20mm. EMB was performed but insufficient. States she has not had bleeding since 2018.  Pt was previously preopped but not cleared due to h/o heart disease.  ?  Was seen by cardiology since then ([facility] in [place], [place]) and was cleared?from a cardiovascular standpoint. Clearance uploaded as Outside Records but as per Cardiology, patient has low to moderate functional capacity and scheduled for a low CV risk outpatient procedure. No indication for further cardiovascular testing per current guidelines prior to her upcoming low CV risk outpatient procedure.  ?  OBHx: FT SVDx4 (BB 8#10 oz)  GynHx: Denies STIs.  + abnormal pap at age 24; s/p cryotherapy. Paps since then have been normal. Last pap 3/2016 NILM/HPV neg.  Menopause age 54.  ?  Social Hx:  Denying tobacco/alcohol/drugs  Not sexually active right now 2/2 vaginal burning- not using estrogen cream  Lives with her , Daniel,?feels safe at home. Will drive her to and  from surgery.  Review of Systems  Cardiovascular: Normal; Negative for irregular heartbeat, heart murmurs, palpitations.  Pulmonary: Normal; Negative for cough, sputum, shortness of breath, wheezing, asthma, or emphysema.  GI: Negative for pain, vomiting, heartburn, peptic ulcer disease, change in stool.  : Negative except as stated in HPI  Skin: Normal; Negative for rashes, keratoses, skin cancers, or acne.  MSK: Normal; negative for joint swelling, arthritis, joint deformity, problems with ambulation, or injuries.  Neuro: Normal; Negative for seizures, stroke, AMS or dizziness.  Endocrine: Denying excessive thirst, heat or cold intolerance  Vascular: Normal; Negative for varicose veins, blood clots, atherosclerosis, or leg ulcers.  Physical Exam  ???Vitals & Measurements  ??T:?36.8? ?C (Oral)? HR:?59(Peripheral)? RR:?12? BP:?124/84?  ??HT:?157?cm? WT:?106.1?kg? BMI:?43.04?  General: NAD, A/Ox3. Well appearing.  Neck: supple  Respiratory: CTAB  Cardiovascular: RRR no murmurs, rubs, or gallops  Abdomen: soft, nondistended, nontender to palpation  Extremities: no edema, no calf tenderness  ?   exam 6/2018:  - External genitalia: Normal without lesions  - Urethral meatus: Normal  - Bladder: No suprapubic tenderness  - Vaginal/pelvic support: atrophied vaginal mucosa  - Cervix: No CMT, no lesions  - Uterus: unable to palpate due to patient discomfort  - Adnexa/parametria: No fullness or masses  Pain exam:  q-tip test: significant pain with palpation at posterior fourchette  single digit exam: TTP in bilateral levator ani  Assessment/Plan  ?  57 yo with PMB and thickened endometrial stripe (20mm)?scheduled for hysteroscopy D&C on 9/12.?Pt cleared by cardiology. Of note, pt ultimately desiring definitive surgical management with hysterectomy. Intraop, will conduct thorough EUA for discussion of hysterectoy method in the future pending pathology results.  ?  Discussed differential diagnosis for PMB and risk factors  for endometrial cancer. Indication for endometrial evaluation and sampling reviewed with patient.  ?  Alternatives to this planned procedure were explained to the patient, including medical and other surgical management. This procedure and its risks, benefits, complications (including injury to bowel, bladder, major blood vessel, ureter, bleeding, possibility of transfusion, infection, scarring, dyspareunia, erosion, further surgery, incontinence, failure of the procedure, or fistula formation). Additional risks specific to this patient and procedure include uterine perforation, inability to obtain sample due to stenosis and the need for possible LEEP procedure to access endometrial cavity.  ?  Patient counseled on risk of blood transfusion including but not limited to allergic reaction, transmission of HIV, Hep C 1:2 million, transmission of Hep B 1:250,000.  ?  Discussed typical postoperative course and expectations for postoperative pain.?Discussed return precautions and importance of postoperative visit at which point pathology would be reviewed.  ?  [facility] Appointment requested, surgical consents signed.  S/p clearance per IM team and cardiology  Pre-op labs?ordered:? none  Labs AM of surgery: none  Buccal Cytotec on AM of surgery. Advair AM of of surgery.?Okay to continue aspirin perioperatively.  Patient counseled to remain NPO after midnight.  SCDs for DVT prophylaxis Problem List/Past Medical History  Ongoing  ??Graves disease  ?Hypothyroidism (acquired)  ?Morbid obesity  ?Preop examination  Historical  ??Asthma  ??Chronic gastritis  ??Hypertension  ??Pregnant  ??Pregnant  ??Pregnant  ??Pregnant  Procedure/Surgical HistoryFNA of Thyroid (04/25/2019)  Bilateral tubal ligation  Breast biopsy and related procedures  Gastroesophagoscopy via gastrotomy  Heart block  Thyroidectomy   ?  Medications  ?Advair Diskus 500 mcg-50 mcg inhalation powder, 1 inh, INH, BID, 4 refills  ?Advil 200 mg oral tablet, 400 mg= 2  tab(s), Oral, q4hr, PRN  ?albuterol 2.5 mg/3 mL (0.083%) inhalation solution, 2.5 mg= 3 mL, INH, q6hr, PRN, 3 refills  ?esomeprazole 40 mg oral DR capsule, 40 mg= 1 cap(s), Oral, Daily  ?hydrochlorothiazide-triamterene 25 mg-37.5 mg oral tablet, 1 tab(s), Oral, Daily, 3 refills  ?Ibuprofen PM 38 mg-200 mg oral tablet, 2 tab(s), Oral, Once a day (at bedtime)  ?loratadine 10 mg oral tablet, 10 mg= 1 tab(s), Oral, Daily  ?MiraLax oral powder for reconstitution, 17 gm, Oral, BID  ?Nebulizer, See Instructions  ?Synthroid 125 mcg (0.125 mg) oral tablet, 125 mcg= 1 tab(s), Oral, Daily, 4 refills  ?Vitamin C 500 mg oral tablet, 500 mg= 1 tab(s), Oral, Daily  ?Vitamin D 1,000 Units Tab, 1 tab(s), Oral, Daily  Allergies  No Known Medication Allergies  Social History  Abuse/Neglect  ?No, 07/24/2019  ?No, 07/12/2019  ?No, 07/01/2019  Alcohol - Denies Alcohol Use, 05/20/2015  ?1-2 times per year, 07/01/2019  Employment/School  ?Disbaled, 07/01/2019  Exercise  ?Exercise type: Denies., 07/01/2019  Home/Environment  ?Lives with Spouse., 07/01/2019  Nutrition/Health  ?Regular, Fair, 07/12/2019  Sexual  ?Sexual orientation: Straight or heterosexual. Gender Identity Identifies as female., 01/22/2019  ?Sexually active: Yes. Sexually active at age 17 Years. Number of current partners 1. Number of lifetime partners 4. Uses condoms: Yes. History of sexual abuse: No., 10/01/2018  Substance Use  ?Denies, 07/01/2019  Tobacco - Denies Tobacco Use, 05/20/2015  ?Never (less than 100 in lifetime), N/A, 07/24/2019  Family History  ?Brain tumor: Daughter.  ?Breast cancer: Mother.  ?Diabetes mellitus type 1.: Negative: Daughter.  ?Diabetes mellitus type 2: Mother, Daughter and Daughter.  ?Esophageal cancer: Father.  ?Hypertension.: Mother.  Mother- breast cancer, diagnosed late 40s, still living, s/p bilateral maastectomy  Immunizations  ?Vaccine ?Date ?Status   ?influenza virus vaccine, inactivated 12/03/2018 Given   ?pneumococcal 23-polyvalent  vaccine 11/08/2016 Given   ?influenza virus vaccine, inactivated 11/08/2016 Given   ?  Lab Results  ?  Endocervical tissue, Curettage, 10/2018:  - Mucus with few inflammatory cells.?  - Insufficient endometrial tissue for diagnostic evaluation.  Diagnostic Results  Pelvic US, 11/2018:  FINDINGS: The uterus is normal size measuring 10 cm length. The endometrial stripe is indistinct but appears thickened up to 2 cm. Small leiomyomata also suspected measuring 2 cm. No adnexal mass or fluid identified. The ovaries are not well visualized.  ?  IMPRESSION: Indistinct probable thickened endometrium   ?  ?  Addendum by LudaElzbieta santos MD on September 09, 2019 08:37:29 CDT (Verified)  58 F with PMB, thickened endometrium  I have met this patient and agree with the above history and physical.  She understands the procedure as described above with associated risks.  All questions have been answered and consents have been signed.  Procedure:? [facility] D&C   Vitals:  BP: 155/88  HR 92  RR 18  O2 98% on RA  T 37.2

## 2022-05-04 NOTE — HISTORICAL [facility]
This is a historical note converted from [place]. Formatting and pictures may have been removed.  Please reference [place] for original formatting and attached multimedia. Chief Complaint  reflux  History of Present Illness  59F with hx of Graves disease s/p partial thyroidectomy presents with persistent GERD symptoms. States she feels acid in the back of her throat and yellow/green bile when throws up. She has been taking her nexium and her carafate with regularity but still reports symptoms. She reports her stools have been normal, although she will alternate between constipation and diarrhea. Denies hematochezia and hematemesis. Last seen in GI clinic 10/24/19  Review of Systems  Negative unless otherwise stated  Physical Exam  Vitals & Measurements  T:?36.8? ?C (Oral)? HR:?72(Monitored)? RR:?18? BP:?143/88? SpO2:?98%? WT:?102.3?kg? BMI:?41.25?  Gen: NAD  CV: peripheral pulses intact  Pulm: normal effort no accessory muscle use  Abd: soft nd nt  MSK: no edema, moves all extremities  Assessment/Plan  A: 59F with refractory GERD  ?  P:  EGD today  will bx for h pylori   Problem List/Past Medical History  Ongoing  Graves disease  Hypothyroidism (acquired)  Morbid obesity  Preop examination  Historical  Asthma  Chronic gastritis  Hypertension  Pregnant  Pregnant  Pregnant  Pregnant  Procedure/Surgical History  Extraction of Endometrium, Via Natural or Artificial Opening, Diagnostic (09/12/2019)  Hysteroscopic Dilation & Curettage (None) (09/12/2019)  Hysteroscopy, surgical; with sampling (biopsy) of endometrium and/or polypectomy, with or without D & C (09/12/2019)  Inspection of Uterus and Cervix, Via Natural or Artificial Opening Endoscopic (09/12/2019)  FNA of Thyroid (04/25/2019)  Bilateral tubal ligation  Breast biopsy and related procedures  Gastroesophagoscopy via gastrotomy  Heart block  Thyroidectomy   Medications  Inpatient  buffered lidocaine 2% - 0.5 ml syringe, 10 mg= 0.5 mL, Subcutaneous, As  Directed  [id number] 1,000 mL, 1000 mL, IV  Home  albuterol 2.5 mg/3 mL (0.083%) inhalation solution, 2.5 mg= 3 mL, INH, q6hr, PRN, 3 refills  Carafate 1 g oral tablet, 1 gm= 1 tab(s), Oral, QID, 1 refills  esomeprazole 40 mg oral DR capsule, 40 mg= 1 cap(s), Oral, Daily  fluticasone 50 mcg/inh nasal spray, 1 spray(s), Nasal, Daily  hydrochlorothiazide-triamterene 25 mg-37.5 mg oral tablet, 1 tab(s), Oral, Daily, 3 refills  loratadine 10 mg oral tablet, 10 mg= 1 tab(s), Oral, Daily  MiraLax oral powder for reconstitution, 17 gm, Oral, BID  Nebulizer, See Instructions  Synthroid 125 mcg (0.125 mg) oral tablet, 125 mcg= 1 tab(s), Oral, Daily, 4 refills  traZODONE 50 mg oral tablet ( Desyrel ), 50 mg= 1 tab(s), Oral, Once a day (at bedtime), 5 refills  Ventolin HFA 90 mcg/inh inhalation aerosol, 180 mcg= 2 puff(s), INH, q4hr, PRN, 3 refills  Vitamin C 500 mg oral tablet, 500 mg= 1 tab(s), Oral, Daily  Vitamin D 1,000 Units Tab, 1 tab(s), Oral, Daily  Zofran 4 mg oral tablet, 4 mg= 1 tab(s), Oral, q8hr,? ?Not taking  Allergies  No Known Medication Allergies  Social History  Abuse/Neglect  No, 01/08/2020  Alcohol - Denies Alcohol Use, 05/20/2015  1-2 times per year, 07/01/2019  Employment/School  Disbaled, 07/01/2019  Exercise  Exercise type: Denies., 07/01/2019  Home/Environment  Lives with Spouse., 07/01/2019  Nutrition/Health  Regular, Fair, 07/12/2019  Sexual  Sexual orientation: Straight or heterosexual. Gender Identity Identifies as female., 01/22/2019  Sexually active: Yes. Sexually active at age 17 Years. Number of current partners 1. Number of lifetime partners 4. Uses condoms: Yes. History of sexual abuse: No., 10/01/2018  Substance Use  Denies, 07/01/2019  Tobacco - Denies Tobacco Use, 05/20/2015  Never (less than 100 in lifetime), No, 01/08/2020  Family History  Brain tumor: Daughter.  Breast cancer: Mother.  Diabetes mellitus type 1.: Negative: Daughter.  Diabetes mellitus type 2: Mother, Daughter and  Daughter.  Esophageal cancer: Father.  Hypertension.: Mother.  Immunizations  Vaccine Date Status   influenza virus vaccine, inactivated 12/03/2018 Given   pneumococcal 23-polyvalent vaccine 11/08/2016 Given   influenza virus vaccine, inactivated 11/08/2016 Given       Followed by Steffany in clinic.? She describes generalized upper abdominal pain, early satiety, and nausea?without vomiting.? She is on Nexium and Carafate and?reports continued symptoms.? Stools alternate from loose?to constipation. ?Her weight is been stable.

## 2022-05-10 ENCOUNTER — TELEPHONE (OUTPATIENT)
Dept: RHEUMATOLOGY | Facility: CLINIC | Age: 62
End: 2022-05-10
Payer: MEDICARE

## 2022-05-10 RX ORDER — DICLOFENAC SODIUM 75 MG/1
TABLET, DELAYED RELEASE ORAL
Qty: 90 TABLET | Refills: 0 | Status: SHIPPED | OUTPATIENT
Start: 2022-05-10 | End: 2022-10-19

## 2022-05-10 RX ORDER — GABAPENTIN 300 MG/1
CAPSULE ORAL
Qty: 180 CAPSULE | Refills: 0 | Status: SHIPPED | OUTPATIENT
Start: 2022-05-10 | End: 2022-10-19 | Stop reason: SDUPTHER

## 2022-05-10 NOTE — TELEPHONE ENCOUNTER
Called pt and she stated that she is having a lot of bilateral leg pain especially the Right leg, she is having some swelling and pain  in her knee. She feels like her legs want to give out at times ... she also stated that she is having some fatigue. Please Advise... Thanks

## 2022-06-02 ENCOUNTER — TELEPHONE (OUTPATIENT)
Dept: INTERNAL MEDICINE | Facility: CLINIC | Age: 62
End: 2022-06-02
Payer: MEDICARE

## 2022-06-02 NOTE — TELEPHONE ENCOUNTER
"Contacted patient ID and  verified. Patient c/o cough x2 weeks and stated " I have a cold and the mucus is starting to breakup now." This nurse advised patient to seek medical assistance at nearest urgent care clinic for further evaluation and treat. Patient verbalized understanding.  "

## 2022-06-02 NOTE — TELEPHONE ENCOUNTER
----- Message from Radha Solares sent at 6/2/2022  9:42 AM CDT -----  Regarding: Dr. Tracy  Mrs. Barreto left a message stating that she needs Cough Syrup for her cough and has had a cold for about a week and something.  Please Advise.    Thanks

## 2022-06-03 DIAGNOSIS — Z01.419 ROUTINE GYNECOLOGICAL EXAMINATION: Primary | ICD-10-CM

## 2022-06-14 RX ORDER — RABEPRAZOLE SODIUM 20 MG/1
40 TABLET, DELAYED RELEASE ORAL
COMMUNITY
Start: 2022-01-31 | End: 2022-06-15 | Stop reason: ALTCHOICE

## 2022-06-14 RX ORDER — OMEPRAZOLE 40 MG/1
CAPSULE, DELAYED RELEASE ORAL
COMMUNITY
Start: 2022-05-09 | End: 2022-06-15 | Stop reason: SDUPTHER

## 2022-06-14 RX ORDER — TRIAMTERENE/HYDROCHLOROTHIAZID 37.5-25 MG
1 TABLET ORAL DAILY
COMMUNITY
Start: 2022-05-09 | End: 2023-08-10 | Stop reason: SDUPTHER

## 2022-06-15 ENCOUNTER — OFFICE VISIT (OUTPATIENT)
Dept: RHEUMATOLOGY | Facility: CLINIC | Age: 62
End: 2022-06-15
Payer: MEDICARE

## 2022-06-15 VITALS
BODY MASS INDEX: 43.98 KG/M2 | TEMPERATURE: 98 F | HEART RATE: 94 BPM | WEIGHT: 239 LBS | RESPIRATION RATE: 20 BRPM | OXYGEN SATURATION: 97 % | SYSTOLIC BLOOD PRESSURE: 150 MMHG | HEIGHT: 62 IN | DIASTOLIC BLOOD PRESSURE: 95 MMHG

## 2022-06-15 DIAGNOSIS — M05.9 SEROPOSITIVE RHEUMATOID ARTHRITIS: Primary | ICD-10-CM

## 2022-06-15 DIAGNOSIS — F51.01 PRIMARY INSOMNIA: ICD-10-CM

## 2022-06-15 DIAGNOSIS — M79.7 FIBROMYALGIA: ICD-10-CM

## 2022-06-15 PROBLEM — G47.00 INSOMNIA: Status: ACTIVE | Noted: 2022-06-15

## 2022-06-15 PROCEDURE — 99214 OFFICE O/P EST MOD 30 MIN: CPT | Mod: S$PBB,,, | Performed by: INTERNAL MEDICINE

## 2022-06-15 PROCEDURE — 3008F BODY MASS INDEX DOCD: CPT | Mod: CPTII,,, | Performed by: INTERNAL MEDICINE

## 2022-06-15 PROCEDURE — 99214 PR OFFICE/OUTPT VISIT, EST, LEVL IV, 30-39 MIN: ICD-10-PCS | Mod: S$PBB,,, | Performed by: INTERNAL MEDICINE

## 2022-06-15 PROCEDURE — 1159F MED LIST DOCD IN RCRD: CPT | Mod: CPTII,,, | Performed by: INTERNAL MEDICINE

## 2022-06-15 PROCEDURE — 1159F PR MEDICATION LIST DOCUMENTED IN MEDICAL RECORD: ICD-10-PCS | Mod: CPTII,,, | Performed by: INTERNAL MEDICINE

## 2022-06-15 PROCEDURE — 99213 OFFICE O/P EST LOW 20 MIN: CPT | Mod: PBBFAC | Performed by: INTERNAL MEDICINE

## 2022-06-15 PROCEDURE — 3008F PR BODY MASS INDEX (BMI) DOCUMENTED: ICD-10-PCS | Mod: CPTII,,, | Performed by: INTERNAL MEDICINE

## 2022-06-15 RX ORDER — HYDROXYCHLOROQUINE SULFATE 200 MG/1
200 TABLET, FILM COATED ORAL 2 TIMES DAILY
Qty: 60 TABLET | Refills: 5 | Status: SHIPPED | OUTPATIENT
Start: 2022-06-15 | End: 2022-10-19 | Stop reason: SDUPTHER

## 2022-06-15 RX ORDER — HYDROCODONE BITARTRATE AND ACETAMINOPHEN 10; 325 MG/1; MG/1
1 TABLET ORAL EVERY 6 HOURS PRN
Qty: 20 TABLET | Refills: 0 | Status: SHIPPED | OUTPATIENT
Start: 2022-06-15 | End: 2022-06-15

## 2022-06-15 RX ORDER — HYDROXYCHLOROQUINE SULFATE 200 MG/1
200 TABLET, FILM COATED ORAL 2 TIMES DAILY
Qty: 60 TABLET | Refills: 5 | Status: SHIPPED | OUTPATIENT
Start: 2022-06-15 | End: 2022-06-15

## 2022-06-15 RX ORDER — METHOTREXATE 2.5 MG/1
TABLET ORAL
Qty: 30 TABLET | Refills: 5 | Status: SHIPPED | OUTPATIENT
Start: 2022-06-15 | End: 2022-10-19 | Stop reason: SDUPTHER

## 2022-06-15 RX ORDER — FOLIC ACID 1 MG/1
1000 TABLET ORAL DAILY
Qty: 30 TABLET | Refills: 5 | Status: SHIPPED | OUTPATIENT
Start: 2022-06-15 | End: 2022-10-19 | Stop reason: SDUPTHER

## 2022-06-15 RX ORDER — OMEPRAZOLE 40 MG/1
40 CAPSULE, DELAYED RELEASE ORAL EVERY MORNING
Qty: 30 CAPSULE | Refills: 5 | Status: SHIPPED | OUTPATIENT
Start: 2022-06-15 | End: 2022-06-15

## 2022-06-15 RX ORDER — FOLIC ACID 1 MG/1
1000 TABLET ORAL DAILY
Qty: 30 TABLET | Refills: 5 | Status: SHIPPED | OUTPATIENT
Start: 2022-06-15 | End: 2022-06-15

## 2022-06-15 RX ORDER — LANOLIN ALCOHOL/MO/W.PET/CERES
400 CREAM (GRAM) TOPICAL NIGHTLY
Qty: 30 TABLET | Refills: 5 | Status: SHIPPED | OUTPATIENT
Start: 2022-06-15 | End: 2022-06-15

## 2022-06-15 RX ORDER — HYDROCODONE BITARTRATE AND ACETAMINOPHEN 10; 325 MG/1; MG/1
1 TABLET ORAL 2 TIMES DAILY PRN
Qty: 60 TABLET | Refills: 0 | Status: SHIPPED | OUTPATIENT
Start: 2022-06-15 | End: 2022-07-13 | Stop reason: SDUPTHER

## 2022-06-15 RX ORDER — ADALIMUMAB 40MG/0.4ML
40 KIT SUBCUTANEOUS
Qty: 2 PEN | Refills: 11 | Status: SHIPPED | OUTPATIENT
Start: 2022-06-15 | End: 2022-06-15

## 2022-06-15 RX ORDER — TIZANIDINE 2 MG/1
2 TABLET ORAL NIGHTLY
Qty: 30 TABLET | Refills: 5 | Status: SHIPPED | OUTPATIENT
Start: 2022-06-15 | End: 2022-10-19 | Stop reason: SDUPTHER

## 2022-06-15 RX ORDER — ADALIMUMAB 40MG/0.4ML
40 KIT SUBCUTANEOUS
Qty: 2 PEN | Refills: 11 | Status: SHIPPED | OUTPATIENT
Start: 2022-06-15 | End: 2023-03-22 | Stop reason: SDUPTHER

## 2022-06-15 RX ORDER — OMEPRAZOLE 40 MG/1
40 CAPSULE, DELAYED RELEASE ORAL EVERY MORNING
Qty: 30 CAPSULE | Refills: 5 | Status: SHIPPED | OUTPATIENT
Start: 2022-06-15 | End: 2022-10-19 | Stop reason: SDUPTHER

## 2022-06-15 RX ORDER — METHOTREXATE 2.5 MG/1
TABLET ORAL
Qty: 30 TABLET | Refills: 5 | Status: SHIPPED | OUTPATIENT
Start: 2022-06-15 | End: 2022-06-15

## 2022-06-15 RX ORDER — TIZANIDINE 2 MG/1
2 TABLET ORAL NIGHTLY
Qty: 30 TABLET | Refills: 5 | Status: SHIPPED | OUTPATIENT
Start: 2022-06-15 | End: 2022-06-15

## 2022-06-15 RX ORDER — LANOLIN ALCOHOL/MO/W.PET/CERES
400 CREAM (GRAM) TOPICAL NIGHTLY
Qty: 30 TABLET | Refills: 5 | Status: SHIPPED | OUTPATIENT
Start: 2022-06-15 | End: 2022-07-13

## 2022-06-15 NOTE — PROGRESS NOTES
"Subjective:       Patient ID: Mary Lou Perez is a 61 y.o. female.    Chief Complaint: Rheumatoid Arthritis and Fibromyalgia    The patient is complaining of joint pain involving the MCP PIP wrist elbow shoulders hips knees and ankles bilaterally.  The pain is 5/10 in intensity dull in quality and continuous.  That is associated with a morning stiffness lasting for more than 60 minutes.  Is also having difficulty maintaining a good night of sleep.  This has been associated with myalgias.  Muscle aches are 7/10 in intensity dull in quality and continuous.  They are associated with fatigue.  No fever no chills no others.      Fibromyalgia  Pertinent negatives include no abdominal pain, chest pain, chills, congestion, fever, rash, vomiting or weakness.     Review of Systems   Constitutional: Negative for appetite change, chills and fever.   HENT: Negative for congestion, ear pain, mouth sores, nosebleeds and trouble swallowing.    Eyes: Negative for photophobia and discharge.   Respiratory: Negative for chest tightness and shortness of breath.    Cardiovascular: Negative for chest pain.   Gastrointestinal: Negative for abdominal pain and vomiting.   Endocrine: Negative.    Genitourinary: Negative for hematuria.   Musculoskeletal:        As per HPI   Skin: Negative for rash.   Neurological: Negative for weakness.         Objective:   BP (!) 150/95 (BP Location: Left arm, Patient Position: Sitting, BP Method: Large (Manual))   Pulse 94   Temp 97.9 °F (36.6 °C) (Oral)   Resp 20   Ht 5' 2" (1.575 m)   Wt 108.4 kg (239 lb)   SpO2 97%   BMI 43.71 kg/m²      Physical Exam   Constitutional: She is oriented to person, place, and time. She appears well-developed and well-nourished. No distress.   HENT:   Head: Normocephalic and atraumatic.   Right Ear: External ear normal.   Left Ear: External ear normal.   Eyes: Pupils are equal, round, and reactive to light.   Cardiovascular: Normal rate, regular rhythm and " normal heart sounds.   Pulmonary/Chest: Breath sounds normal.   Abdominal: Soft. There is no abdominal tenderness.   Musculoskeletal:      Right shoulder: Tenderness present.      Left shoulder: Tenderness present.      Right elbow: Tenderness present.      Left elbow: Tenderness present.      Right wrist: Tenderness present.      Left wrist: Tenderness present.      Cervical back: Neck supple.      Right hip: Tenderness present.      Left hip: Tenderness present.      Right knee: Tenderness present.      Left knee: Tenderness present.      Right ankle: Tenderness present.      Left ankle: Tenderness present.   Lymphadenopathy:     She has no cervical adenopathy.   Neurological: She is alert and oriented to person, place, and time. She displays normal reflexes. No cranial nerve deficit or sensory deficit. She exhibits normal muscle tone. Coordination normal.   Skin: No rash noted. No erythema.   Vitals reviewed.      Right Side Rheumatological Exam     The patient is tender to palpation of the shoulder, elbow, wrist, knee, 1st PIP, 1st MCP, 2nd PIP, 2nd MCP, 3rd PIP, 3rd MCP, 4th PIP, 4th MCP, 5th PIP, hip, ankle, 1st MTP, 2nd MTP, 3rd MTP, 4th MTP, 5th MTP, 1st toe IP, 2nd toe IP, 3rd toe IP, 4th toe IP and 5th toe IP    Left Side Rheumatological Exam     The patient is tender to palpation of the shoulder, elbow, wrist, knee, 1st PIP, 1st MCP, 2nd PIP, 2nd MCP, 3rd PIP, 3rd MCP, 4th PIP, 4th MCP, 5th PIP, 5th MCP, hip, ankle, 1st MTP, 2nd MTP, 3rd MTP, 4th MTP, 5th MTP, 1st toe IP, 2nd toe IP, 3rd toe IP, 4th toe IP and 5th toe IP.         Completed Fibromyalgia exam 11/18 tender points.  No data to display     Assessment:       1. Seropositive rheumatoid arthritis    2. Fibromyalgia    3. Primary insomnia            Plan:       Problem List Items Addressed This Visit        Immunology/Multi System    Seropositive rheumatoid arthritis - Primary    Relevant Medications    folic acid (FOLVITE) 1 MG tablet     HUMIRA,CF, PEN 40 mg/0.4 mL PnKt    hydrOXYchloroQUINE (PLAQUENIL) 200 mg tablet    magnesium oxide (MAG-OX) 400 mg (241.3 mg magnesium) tablet    methotrexate 2.5 MG Tab    omeprazole (PRILOSEC) 40 MG capsule    tiZANidine (ZANAFLEX) 2 MG tablet    HYDROcodone-acetaminophen (NORCO)  mg per tablet       Orthopedic    Fibromyalgia    Relevant Medications    folic acid (FOLVITE) 1 MG tablet    HUMIRA,CF, PEN 40 mg/0.4 mL PnKt    hydrOXYchloroQUINE (PLAQUENIL) 200 mg tablet    magnesium oxide (MAG-OX) 400 mg (241.3 mg magnesium) tablet    methotrexate 2.5 MG Tab    omeprazole (PRILOSEC) 40 MG capsule    tiZANidine (ZANAFLEX) 2 MG tablet    HYDROcodone-acetaminophen (NORCO)  mg per tablet       Other    Insomnia    Relevant Medications    folic acid (FOLVITE) 1 MG tablet    HUMIRA,CF, PEN 40 mg/0.4 mL PnKt    hydrOXYchloroQUINE (PLAQUENIL) 200 mg tablet    magnesium oxide (MAG-OX) 400 mg (241.3 mg magnesium) tablet    methotrexate 2.5 MG Tab    omeprazole (PRILOSEC) 40 MG capsule    tiZANidine (ZANAFLEX) 2 MG tablet    HYDROcodone-acetaminophen (NORCO)  mg per tablet

## 2022-06-23 ENCOUNTER — OFFICE VISIT (OUTPATIENT)
Dept: INTERNAL MEDICINE | Facility: CLINIC | Age: 62
End: 2022-06-23
Payer: MEDICARE

## 2022-06-23 VITALS
HEIGHT: 62 IN | HEART RATE: 77 BPM | WEIGHT: 240.38 LBS | DIASTOLIC BLOOD PRESSURE: 88 MMHG | BODY MASS INDEX: 44.23 KG/M2 | TEMPERATURE: 98 F | RESPIRATION RATE: 18 BRPM | SYSTOLIC BLOOD PRESSURE: 122 MMHG

## 2022-06-23 DIAGNOSIS — K21.9 GASTROESOPHAGEAL REFLUX DISEASE WITHOUT ESOPHAGITIS: ICD-10-CM

## 2022-06-23 DIAGNOSIS — E55.9 VITAMIN D DEFICIENCY: ICD-10-CM

## 2022-06-23 DIAGNOSIS — K21.9 GASTROESOPHAGEAL REFLUX DISEASE, UNSPECIFIED WHETHER ESOPHAGITIS PRESENT: ICD-10-CM

## 2022-06-23 DIAGNOSIS — Z12.31 BREAST CANCER SCREENING BY MAMMOGRAM: ICD-10-CM

## 2022-06-23 DIAGNOSIS — Z23 NEED FOR ZOSTER VACCINE: Primary | ICD-10-CM

## 2022-06-23 PROCEDURE — 99215 OFFICE O/P EST HI 40 MIN: CPT | Mod: PBBFAC

## 2022-06-23 RX ORDER — GUAIFENESIN 100 MG/5ML
200 SOLUTION ORAL 3 TIMES DAILY PRN
Qty: 118 ML | Refills: 2 | Status: SHIPPED | OUTPATIENT
Start: 2022-06-23 | End: 2022-07-13

## 2022-06-23 NOTE — PROGRESS NOTES
I have reviewed and concur with the resident's history, physical, assessment, and plan.  I have discussed with him all issues related to the diagnosis, workup and treatment plan.Care provided as reasonable and necessary.    Heriberto Mullins MD  [facility]

## 2022-06-23 NOTE — PROGRESS NOTES
[facility]    Subjective:      61-year-old with past medical history of rheumatoid arthritis, fibromyalgia, thymoma status post removal, Graves' disease status post thyroidectomy on Synthroid, history of GERD, hypertension, pulmonary nodules today for follow-up. Last visit was with Dr. Sierra on 10/28/2021.Patient follows ENT, cardiologist and rheumatologist.Patient refuses vaccines today. Has been having cough at night and burning pain in her chest. She has been on PPI for long time with not much improvement. No other complaints at this time.     Review of Systems:  10 point ROS negative except for HPI    Objective:   Vital Signs:  Vitals:    06/23/22 1355   BP: 122/88   Pulse: 77   Resp: 18   Temp: 98.1 °F (36.7 °C)      General: well-developed well-nourished in no acute distress  Neck: full range of motion, no thyromegaly or lymphadenopathy  Respiratory: clear to auscultation bilaterally  Cardiovascular: regular rate and rhythm without murmurs, gallops or rubs  Gastrointestinal: soft, non-tender, non-distended with normal bowel sounds, without masses to palpation  Musculoskeletal: MCP PIP wrist elbows shoulders hips knees and ankles tender bilaterally.  Integumentary: no rashes or skin lesions present  Breast: No lumps or nodules noted or tender on palpation  Neurologic: cranial nerves intact, no signs of peripheral neurological deficit, motor/sensory function intact      Body mass index is 43.97 kg/m².         Laboratory:  Lab Results   Component Value Date    WBC 5.9 04/07/2022    HGB 12.3 04/07/2022    HCT 37.9 04/07/2022     04/07/2022    MCV 89.2 04/07/2022    RDW 14.1 04/07/2022    Lab Results   Component Value Date     04/07/2022    K 4.4 04/07/2022    CO2 29 04/07/2022    BUN 9.8 04/07/2022    CREATININE 0.85 04/07/2022    CALCIUM 10.3 04/07/2022      Lab Results   Component Value Date    HGBA1C 4.8 04/07/2022    EAG 91.1 04/07/2022    CREATININE 0.85 04/07/2022     Lab Results   Component Value Date    TSH 2.5940 04/07/2022    [id number] 1.02 04/07/2022                Current Medications:  Current Outpatient Medications   Medication Instructions    albuterol (PROVENTIL/VENTOLIN HFA) 90 mcg/actuation inhaler 2 puffs, Inhalation, Every 6 hours PRN    diclofenac (VOLTAREN) 75 MG EC tablet TAKE 1 TABLET BY MOUTH ONCE DAILY AFTER LUNCH    doxycycline (VIBRAMYCIN) 100 MG Cap TAKE 1 CAPSULE BY MOUTH ONCE DAILY FOR 10 DAYS    fluconazole (DIFLUCAN) 150 MG Tab TAKE 1 TABLET BY MOUTH AS A SINGLE DOSE    fluticasone-salmeterol diskus inhaler 500-50 mcg INHALE 1 DOSE BY MOUTH TWICE DAILY    folic acid (FOLVITE) 1,000 mcg, Oral, Daily    gabapentin (NEURONTIN) 300 MG capsule TAKE 1 CAPSULE BY MOUTH TWICE DAILY AS NEEDED FOR PAIN    guaiFENesin 100 mg/5 ml (ROBITUSSIN) 200 mg, Oral    HUMIRA(CF) PEN 40 mg, Subcutaneous, Every 14 days    HYDROcodone-acetaminophen (NORCO)  mg per tablet 1 tablet, Oral, 2 times daily PRN    hydrOXYchloroQUINE (PLAQUENIL) 200 mg, Oral, 2 times daily    levothyroxine (SYNTHROID) 125 mcg, Oral, Daily    magnesium oxide (MAG-OX) 400 mg, Oral, Nightly    methotrexate 2.5 MG Tab TAKE 3 TABLETS BY MOUTH AFTER LUNCH AND TAKE 3 TABLETS AFTER SUPPER ON TUESDAY S    montelukast (SINGULAIR) 10 mg, Oral    nitroGLYCERIN (NITROSTAT) 0.4 MG SL tablet DISSOLVE ONE TABLET UNDER THE TONGUE EVERY 5 MINUTES AS NEEDED FOR CHEST PAIN. DO NOT EXCEED A TOTAL OF 3 DOSES IN 15 MINUTES. IF PAIN PERSISTS, SEEK MEDICAL ATTENTION    omeprazole (PRILOSEC) 40 mg, Oral, Every morning    tiZANidine (ZANAFLEX) 2 mg, Oral, Nightly    triamterene-hydrochlorothiazide 37.5-25 mg (MAXZIDE-25) 37.5-25 mg per tablet 1 tablet, Oral, Daily        Assessment and Plan:      Seropositive rheumatoid arthritis  FRANK positive, rheumatoid factor positive, CCP positive  Continue Plaquenil 200 mg twice daily  Continue methotrexate 15 mg   Continue diclofenac 75 mg  Continue following  Rheum     Fibromyalgia  Denies any muscle pain or body pain at this time  Continue gabapentin per rheum recs    Thymoma status post thymectomy 2014  Denies chest pain, hoarse voice, swelling in the face, neck, upper body arms at this time  Was Following ENT , LAST SEEN AT 9/3/20, Advised to call and ask for her next appt   continue Flonase, antihistamine per ENT    Graves' disease status post thyroidectomy  On Synthroid daily  Denies heart palpitations  Endorses weight gain, constipation and dry skin  ENT referred for a thyroid US with results below :  Ultrasound thyroid on 11/02/2020 shows right thyroidectomy.  1 cm left-sided thyroid nodule.  Hypervascular left level thyroid consistent with thyroiditis.  TFTS wnl 4/22    CAD   LHC in 1997;No interventions done per pt  Was following cards in the past   Last seen cardiologist on 09/30/2020  Lexiscan stress test on 06/24/2020 - for ischemia  Echo done on 08/07/2020 shows left undergo ejection fraction 55 to 60%  Denies any chest pain or SOB at this time    GERD  Patient used to follow up with GI before but lost in touch when covid hits  H. pylori gastritis confirmed by biopsy January 2020, completed antibiotics   fecal occult immunoassay for colon cancer screening was neg in 9/2021  switched from Nexium to Rabeprazole 40 daily at this time  will refer to GI for an endoscopy since  no improvement with rabeprazole      Hypertension- controlled  Continue HCTZ-traimterene        RTC in 6 months with cbc,cmp,vit d   Reordered referral for mammogram   papsmear appt in 4/23  referral to GI for EGD sent         Health Maintenance   Topic Date Due    Mammogram  11/07/2017    Lipid Panel  04/07/2027    TETANUS VACCINE  07/18/2030    Hepatitis C Screening  Completed                  Laura Chin DO

## 2022-07-13 ENCOUNTER — OFFICE VISIT (OUTPATIENT)
Dept: GASTROENTEROLOGY | Facility: CLINIC | Age: 62
End: 2022-07-13
Payer: MEDICARE

## 2022-07-13 VITALS
OXYGEN SATURATION: 95 % | RESPIRATION RATE: 16 BRPM | WEIGHT: 240.63 LBS | BODY MASS INDEX: 42.64 KG/M2 | DIASTOLIC BLOOD PRESSURE: 89 MMHG | HEART RATE: 80 BPM | SYSTOLIC BLOOD PRESSURE: 118 MMHG | HEIGHT: 63 IN | TEMPERATURE: 98 F

## 2022-07-13 DIAGNOSIS — K59.09 CHRONIC CONSTIPATION: ICD-10-CM

## 2022-07-13 DIAGNOSIS — M05.9 SEROPOSITIVE RHEUMATOID ARTHRITIS: ICD-10-CM

## 2022-07-13 DIAGNOSIS — M79.7 FIBROMYALGIA: Primary | ICD-10-CM

## 2022-07-13 DIAGNOSIS — Z12.11 SCREENING FOR COLON CANCER: ICD-10-CM

## 2022-07-13 DIAGNOSIS — F51.01 PRIMARY INSOMNIA: ICD-10-CM

## 2022-07-13 DIAGNOSIS — K21.9 GASTROESOPHAGEAL REFLUX DISEASE, UNSPECIFIED WHETHER ESOPHAGITIS PRESENT: ICD-10-CM

## 2022-07-13 DIAGNOSIS — R10.13 EPIGASTRIC ABDOMINAL PAIN: Primary | ICD-10-CM

## 2022-07-13 PROCEDURE — 3079F PR MOST RECENT DIASTOLIC BLOOD PRESSURE 80-89 MM HG: ICD-10-PCS | Mod: CPTII,,, | Performed by: NURSE PRACTITIONER

## 2022-07-13 PROCEDURE — 1159F PR MEDICATION LIST DOCUMENTED IN MEDICAL RECORD: ICD-10-PCS | Mod: CPTII,,, | Performed by: NURSE PRACTITIONER

## 2022-07-13 PROCEDURE — 3008F PR BODY MASS INDEX (BMI) DOCUMENTED: ICD-10-PCS | Mod: CPTII,,, | Performed by: NURSE PRACTITIONER

## 2022-07-13 PROCEDURE — 1160F RVW MEDS BY RX/DR IN RCRD: CPT | Mod: CPTII,,, | Performed by: NURSE PRACTITIONER

## 2022-07-13 PROCEDURE — 1160F PR REVIEW ALL MEDS BY PRESCRIBER/CLIN PHARMACIST DOCUMENTED: ICD-10-PCS | Mod: CPTII,,, | Performed by: NURSE PRACTITIONER

## 2022-07-13 PROCEDURE — 99215 OFFICE O/P EST HI 40 MIN: CPT | Mod: PBBFAC | Performed by: NURSE PRACTITIONER

## 2022-07-13 PROCEDURE — 1159F MED LIST DOCD IN RCRD: CPT | Mod: CPTII,,, | Performed by: NURSE PRACTITIONER

## 2022-07-13 PROCEDURE — 3008F BODY MASS INDEX DOCD: CPT | Mod: CPTII,,, | Performed by: NURSE PRACTITIONER

## 2022-07-13 PROCEDURE — 3079F DIAST BP 80-89 MM HG: CPT | Mod: CPTII,,, | Performed by: NURSE PRACTITIONER

## 2022-07-13 PROCEDURE — 3074F SYST BP LT 130 MM HG: CPT | Mod: CPTII,,, | Performed by: NURSE PRACTITIONER

## 2022-07-13 PROCEDURE — 3074F PR MOST RECENT SYSTOLIC BLOOD PRESSURE < 130 MM HG: ICD-10-PCS | Mod: CPTII,,, | Performed by: NURSE PRACTITIONER

## 2022-07-13 PROCEDURE — 99214 OFFICE O/P EST MOD 30 MIN: CPT | Mod: S$PBB,,, | Performed by: NURSE PRACTITIONER

## 2022-07-13 PROCEDURE — 99214 PR OFFICE/OUTPT VISIT, EST, LEVL IV, 30-39 MIN: ICD-10-PCS | Mod: S$PBB,,, | Performed by: NURSE PRACTITIONER

## 2022-07-13 RX ORDER — HYDROCODONE BITARTRATE AND ACETAMINOPHEN 10; 325 MG/1; MG/1
1 TABLET ORAL 2 TIMES DAILY PRN
Qty: 60 TABLET | Refills: 0 | Status: SHIPPED | OUTPATIENT
Start: 2022-07-13 | End: 2022-08-17 | Stop reason: SDUPTHER

## 2022-07-13 NOTE — ASSESSMENT & PLAN NOTE
She underwent EGD January 8, 2020 with findings of mild linear erythema in the stomach body and antrum and otherwise normal stomach and duodenum. Pathology revealed mild chronic duodenitis, mild chronic active inflammation in the antrum with no dysplasia identified and numerous Helicobacter-like organisms identified, and mild chronic active gastritis in the stomach body with numerous Helicobacter-like organisms identified.    GERD lifestyle modifications  Reflux precautions  Avoid NSAID use  EGD  H. Pylori stool antigen  Continue omeprazole 40 mg daily  Call with updates  F/u clinic visit with NP in 4 months after procedure

## 2022-07-13 NOTE — ASSESSMENT & PLAN NOTE
Recommend soluble fiber supplementation  Avoid straining or sitting on the toilet for long periods of time  Linzess 145 mcg daily  Call with updates  F/u clinic visit with NP in 4 months after procedure

## 2022-07-13 NOTE — TELEPHONE ENCOUNTER
----- Message from Jackie Tiwrai sent at 7/13/2022  9:34 AM CDT -----  Pt needs script for hydrocortisone sent to [facility] in [place] on [address].

## 2022-07-13 NOTE — TELEPHONE ENCOUNTER
Called pt to monserrat what med she needs refill of  Pt asking for refill of Wirtz  Confirmed pt taking all other meds as ordered  Told her I'd check w/the Dr and if script not sent I'll call her back

## 2022-07-13 NOTE — PROGRESS NOTES
Subjective:       Patient ID: Mary Lou Perez is a 61 y.o. female.    Chief Complaint: Gastroesophageal Reflux and Dysphagia    This 61-year-old  female with a history of Graves' disease status post right lobe thyroidectomy, thymoma resection, subclinical hypothyroidism, asthma, hypertension, and rheumatoid arthritis is referred for EGD. She presents unaccompanied. She reports intermittent epigastric abdominal pain described as sharp and crampy for the past several years. She is unable to identify specific triggers and reports relief with defecation. She has associated acid reflux, pyrosis, belching, bloating, and early satiety.  She will have frequent nausea with occasional subsequent vomiting (nonbilious and nonbloody).  She takes omeprazole 40 mg daily.  Her appetite is good and her weight is stable.  She denies fever, chills, odynophagia, or dysphagia.  She reports a longstanding history of constipation and going up to several days without a bowel movement.  She will have occasional diarrhea as well.  She has tried MiraLAX for constipation which she stopped secondary to abdominal cramping.  She denies melena, hematochezia, fecal urgency, fecal incontinence, or pain with defecation.    FIT testing negative September 1, 2021. She underwent EGD January 8, 2020 with findings of mild linear erythema in the stomach body and antrum and otherwise normal stomach and duodenum. Pathology revealed mild chronic duodenitis, mild chronic active inflammation in the antrum with no dysplasia identified and numerous Helicobacter-like organisms identified, and mild chronic active gastritis in the stomach body with numerous Helicobacter-like organisms identified.  She takes aspirin 81 mg daily.  She denies tobacco or alcohol use.  She denies a family history of IBD, colon polyps, or colon cancer.    Review of patient's allergies indicates:  No Known Allergies    Past Medical History:   Diagnosis Date     Asthma     Fibromyalgia     Graves' disease     Hypertension     Postoperative hypothyroidism     Rheumatoid arthritis      Past Surgical History:   Procedure Laterality Date    BILATERAL TUBAL LIGATION      BREAST BIOPSY      CARDIAC CATHETERIZATION      DILATION AND CURETTAGE OF UTERUS      ESOPHAGOGASTRODUODENOSCOPY      MEDIASTINAL MASS EXCISION      THYROIDECTOMY, PARTIAL       Family History:   family history includes Breast cancer in her mother; Diabetes in her daughter and mother; Esophageal cancer in her father; Hypertension in her mother.    Social History:    reports that she has never smoked. She has never used smokeless tobacco. She reports previous alcohol use. She reports that she does not use drugs.    Review of Systems   All other systems reviewed and are negative.        Objective:      Physical Exam  Constitutional:       Appearance: Normal appearance.   HENT:      Head: Normocephalic.      Mouth/Throat:      Mouth: Mucous membranes are moist.   Eyes:      Extraocular Movements: Extraocular movements intact.      Conjunctiva/sclera: Conjunctivae normal.      Pupils: Pupils are equal, round, and reactive to light.   Cardiovascular:      Rate and Rhythm: Normal rate and regular rhythm.      Pulses: Normal pulses.      Heart sounds: Normal heart sounds.   Pulmonary:      Effort: Pulmonary effort is normal.      Breath sounds: Normal breath sounds.   Abdominal:      General: Bowel sounds are normal.      Palpations: Abdomen is soft.      Comments: Increased adiposity, epigastric abdominal tenderness with deep palpation, no rebound or guarding   Musculoskeletal:         General: Normal range of motion.      Cervical back: Normal range of motion and neck supple.   Skin:     General: Skin is warm and dry.   Neurological:      General: No focal deficit present.      Mental Status: She is alert and oriented to person, place, and time.   Psychiatric:         Mood and Affect: Mood normal.          Behavior: Behavior normal.         Thought Content: Thought content normal.         Judgment: Judgment normal.         Home Medications:     Current Outpatient Medications   Medication Sig    albuterol (PROVENTIL/VENTOLIN HFA) 90 mcg/actuation inhaler Inhale 2 puffs into the lungs every 6 (six) hours as needed.    diclofenac (VOLTAREN) 75 MG EC tablet TAKE 1 TABLET BY MOUTH ONCE DAILY AFTER LUNCH    fluticasone-salmeterol diskus inhaler 500-50 mcg INHALE 1 DOSE BY MOUTH TWICE DAILY    folic acid (FOLVITE) 1 MG tablet Take 1 tablet (1,000 mcg total) by mouth once daily.    gabapentin (NEURONTIN) 300 MG capsule TAKE 1 CAPSULE BY MOUTH TWICE DAILY AS NEEDED FOR PAIN    HUMIRA,CF, PEN 40 mg/0.4 mL PnKt Inject 0.4 mLs (40 mg total) into the skin every 14 (fourteen) days.    HYDROcodone-acetaminophen (NORCO)  mg per tablet Take 1 tablet by mouth 2 (two) times daily as needed for Pain.    hydrOXYchloroQUINE (PLAQUENIL) 200 mg tablet Take 1 tablet (200 mg total) by mouth 2 (two) times daily.    levothyroxine (SYNTHROID) 125 MCG tablet Take 125 mcg by mouth once daily.    methotrexate 2.5 MG Tab TAKE 3 TABLETS BY MOUTH AFTER LUNCH AND TAKE 3 TABLETS AFTER SUPPER ON TUESDAY S    montelukast (SINGULAIR) 10 mg tablet Take 10 mg by mouth once daily.    nitroGLYCERIN (NITROSTAT) 0.4 MG SL tablet DISSOLVE ONE TABLET UNDER THE TONGUE EVERY 5 MINUTES AS NEEDED FOR CHEST PAIN. DO NOT EXCEED A TOTAL OF 3 DOSES IN 15 MINUTES. IF PAIN PERSISTS, SEEK MEDICAL ATTENTION    omeprazole (PRILOSEC) 40 MG capsule Take 1 capsule (40 mg total) by mouth every morning.    tiZANidine (ZANAFLEX) 2 MG tablet Take 1 tablet (2 mg total) by mouth nightly.    triamterene-hydrochlorothiazide 37.5-25 mg (MAXZIDE-25) 37.5-25 mg per tablet Take 1 tablet by mouth once daily.    guaiFENesin 100 mg/5 ml (ROBITUSSIN) 100 mg/5 mL syrup Take 10 mLs (200 mg total) by mouth 3 (three) times daily as needed for Cough. (Patient not taking: Reported  on 7/13/2022)    magnesium oxide (MAG-OX) 400 mg (241.3 mg magnesium) tablet Take 1 tablet (400 mg total) by mouth nightly. (Patient not taking: Reported on 7/13/2022)     No current facility-administered medications for this visit.     Imaging Results:     Narrative & Impression  Abdominal sonogram targeted right upper quadrant     Indication abdominal pain     Technique is Doppler grayscale spectral analysis  No comparison     FINDINGS:  The liver measures 16 cm. Portal venous flow appears to be antegrade.  Visualized pancreas is unremarkable. No gallbladder pathology  identified and no biliary ductal dilatation noted CBD 3 mm right  kidney normal size without hydronephrosis.     IMPRESSION: Mildly echogenic liver possibly fatty infiltration         Electronically Signed By: Deanne Bhakta MD  Date/Time Signed: 12/09/2019 10:43     Assessment/Plan:     Problem List Items Addressed This Visit        GI    Epigastric abdominal pain - Primary     She underwent EGD January 8, 2020 with findings of mild linear erythema in the stomach body and antrum and otherwise normal stomach and duodenum. Pathology revealed mild chronic duodenitis, mild chronic active inflammation in the antrum with no dysplasia identified and numerous Helicobacter-like organisms identified, and mild chronic active gastritis in the stomach body with numerous Helicobacter-like organisms identified.    GERD lifestyle modifications  Reflux precautions  Avoid NSAID use  EGD  H. Pylori stool antigen  Continue omeprazole 40 mg daily  Call with updates  F/u clinic visit with NP in 4 months after procedure           Relevant Orders    Helicobacter Pylori Antigen Fecal EIA    Case Request Endoscopy: EGD (ESOPHAGOGASTRODUODENOSCOPY) (Completed)    GERD (gastroesophageal reflux disease)     She underwent EGD January 8, 2020 with findings of mild linear erythema in the stomach body and antrum and otherwise normal stomach and duodenum. Pathology revealed  mild chronic duodenitis, mild chronic active inflammation in the antrum with no dysplasia identified and numerous Helicobacter-like organisms identified, and mild chronic active gastritis in the stomach body with numerous Helicobacter-like organisms identified.    GERD lifestyle modifications  Reflux precautions  Avoid NSAID use  EGD  H. Pylori stool antigen  Continue omeprazole 40 mg daily  Call with updates  F/u clinic visit with NP in 4 months after procedure           Relevant Orders    Helicobacter Pylori Antigen Fecal EIA    Case Request Endoscopy: EGD (ESOPHAGOGASTRODUODENOSCOPY) (Completed)    Chronic constipation     Recommend soluble fiber supplementation  Avoid straining or sitting on the toilet for long periods of time  Linzess 145 mcg daily  Call with updates  F/u clinic visit with NP in 4 months after procedure           Relevant Medications    linaCLOtide (LINZESS) 145 mcg Cap capsule    Screening for colon cancer     FIT testing negative September 1, 2021.

## 2022-08-15 DIAGNOSIS — M05.9 SEROPOSITIVE RHEUMATOID ARTHRITIS: Primary | ICD-10-CM

## 2022-08-15 DIAGNOSIS — M79.7 FIBROMYALGIA: ICD-10-CM

## 2022-08-15 NOTE — TELEPHONE ENCOUNTER
----- Message from Dipti Jones sent at 8/12/2022  2:17 PM CDT -----  Regarding: med refill  Pt called and stated she needs a refill of rx HYDROCODONE to be sent to [facility] in [place]. Contact info is [phone]      Thank You  Sanam BANEGAS

## 2022-08-16 NOTE — TELEPHONE ENCOUNTER
Received message from Dr. Sierra re; refill request    MD Whit Baer, RN  Caller: Unspecified (Yesterday,  9:34 AM)  Please inform this patient that we found out that she is getting [place] from her medical doctor and from Dr. Sierra.  It is against the law to get pain medications from more than 1 physician.  We will stop providing her with pain medications.  Thank you BPH       Called pt to let her know  She said she didn't fill the other script ordered by her dentist - stated she still has the script and didn't fill it  Pt became upset and feels it's unfair  Explained laws in place with these meds and encouraged her to f/u w/her PCP

## 2022-08-17 RX ORDER — HYDROCODONE BITARTRATE AND ACETAMINOPHEN 10; 325 MG/1; MG/1
1 TABLET ORAL 2 TIMES DAILY PRN
Qty: 60 TABLET | Refills: 0 | Status: SHIPPED | OUTPATIENT
Start: 2022-08-17 | End: 2022-09-16 | Stop reason: SDUPTHER

## 2022-08-17 NOTE — TELEPHONE ENCOUNTER
Pt came to clinic   Brought the prescription for [place] given by Oral Surgeon Yemi Cheek and a report from her [facility] pharmacy that confirms only Dr. Sierra's Norco was dispensed  Told pt I'd f/u w/Dr. Sierra and inquire about refill    Showed Dr. Sierra what pt brought in  He asked me to document this information and submit refill for him to sign  Documents brought by patient scanned to chart

## 2022-09-06 ENCOUNTER — HOSPITAL ENCOUNTER (OUTPATIENT)
Dept: RADIOLOGY | Facility: HOSPITAL | Age: 62
Discharge: HOME | End: 2022-09-06
Attending: STUDENT IN AN ORGANIZED HEALTH CARE EDUCATION/TRAINING PROGRAM
Payer: MEDICARE

## 2022-09-06 DIAGNOSIS — K21.9 GASTROESOPHAGEAL REFLUX DISEASE, UNSPECIFIED WHETHER ESOPHAGITIS PRESENT: ICD-10-CM

## 2022-09-06 DIAGNOSIS — Z23 NEED FOR ZOSTER VACCINE: ICD-10-CM

## 2022-09-06 DIAGNOSIS — Z12.31 BREAST CANCER SCREENING BY MAMMOGRAM: ICD-10-CM

## 2022-09-06 PROCEDURE — 77067 SCR MAMMO BI INCL CAD: CPT | Mod: TC

## 2022-09-06 PROCEDURE — 77063 MAMMO DIGITAL SCREENING BILAT WITH TOMO: ICD-10-PCS | Mod: 26,,, | Performed by: RADIOLOGY

## 2022-09-06 PROCEDURE — 77067 SCR MAMMO BI INCL CAD: CPT | Mod: 26,,, | Performed by: RADIOLOGY

## 2022-09-06 PROCEDURE — 77067 MAMMO DIGITAL SCREENING BILAT WITH TOMO: ICD-10-PCS | Mod: 26,,, | Performed by: RADIOLOGY

## 2022-09-06 PROCEDURE — 77063 BREAST TOMOSYNTHESIS BI: CPT | Mod: 26,,, | Performed by: RADIOLOGY

## 2022-09-12 ENCOUNTER — TELEPHONE (OUTPATIENT)
Dept: GASTROENTEROLOGY | Facility: CLINIC | Age: 62
End: 2022-09-12
Payer: MEDICARE

## 2022-09-12 DIAGNOSIS — A04.8 H. PYLORI INFECTION: Primary | ICD-10-CM

## 2022-09-12 DIAGNOSIS — R10.13 EPIGASTRIC ABDOMINAL PAIN: Primary | ICD-10-CM

## 2022-09-12 RX ORDER — BISMUTH SUBCITRATE POTASSIUM, METRONIDAZOLE, TETRACYCLINE HYDROCHLORIDE 140; 125; 125 MG/1; MG/1; MG/1
3 CAPSULE ORAL
Qty: 120 CAPSULE | Refills: 0 | Status: SHIPPED | OUTPATIENT
Start: 2022-09-12 | End: 2022-09-14 | Stop reason: SDUPTHER

## 2022-09-12 NOTE — TELEPHONE ENCOUNTER
Pt has not received any medication to treat. Informed pt that repeat stool test would need to be completed 4-6 weeks after completion of treatment. Pt would like medication sent to [facility] pharmacy.      ----- Message from GENEVA Castanon sent at 9/12/2022  2:09 PM CDT -----  Regarding: H. pylori results  Please see if she was treated. If not, I will send her in medication to start. Thanks  ----- Message -----  From: Mikayla Maurer LPN  Sent: 9/12/2022   1:50 PM CDT  To: GENEVA Castanon    You placed a new order today, this was collected on 9/6/22

## 2022-09-12 NOTE — TELEPHONE ENCOUNTER
Please have her continue omeprazole as directed and I have sent in Pylera for her to start for 10 days. I have placed order for repeat stool test in 4-6 weeks after treatment (for lab collect). Thanks

## 2022-09-13 RX ORDER — PANTOPRAZOLE SODIUM 20 MG/1
20 TABLET, DELAYED RELEASE ORAL DAILY
Qty: 14 TABLET | Refills: 0 | Status: SHIPPED | OUTPATIENT
Start: 2022-09-13 | End: 2022-09-13 | Stop reason: SDUPTHER

## 2022-09-13 RX ORDER — AMOXICILLIN 500 MG/1
500 CAPSULE ORAL EVERY 12 HOURS
Qty: 28 CAPSULE | Refills: 0 | Status: SHIPPED | OUTPATIENT
Start: 2022-09-13 | End: 2022-09-13 | Stop reason: SDUPTHER

## 2022-09-13 RX ORDER — CLARITHROMYCIN 500 MG/1
500 TABLET, FILM COATED ORAL DAILY
Qty: 14 TABLET | Refills: 0 | Status: SHIPPED | OUTPATIENT
Start: 2022-09-13 | End: 2022-09-13 | Stop reason: SDUPTHER

## 2022-09-14 DIAGNOSIS — A04.8 H. PYLORI INFECTION: ICD-10-CM

## 2022-09-14 RX ORDER — BISMUTH SUBCITRATE POTASSIUM, METRONIDAZOLE, TETRACYCLINE HYDROCHLORIDE 140; 125; 125 MG/1; MG/1; MG/1
3 CAPSULE ORAL
Qty: 120 CAPSULE | Refills: 0 | Status: SHIPPED | OUTPATIENT
Start: 2022-09-14 | End: 2022-09-24

## 2022-09-16 DIAGNOSIS — M79.7 FIBROMYALGIA: ICD-10-CM

## 2022-09-16 DIAGNOSIS — M05.9 SEROPOSITIVE RHEUMATOID ARTHRITIS: Primary | ICD-10-CM

## 2022-09-16 RX ORDER — PREDNISONE 20 MG/1
TABLET ORAL
COMMUNITY
Start: 2022-09-05 | End: 2022-10-19 | Stop reason: ALTCHOICE

## 2022-09-16 RX ORDER — CETIRIZINE HYDROCHLORIDE 10 MG/1
10 TABLET ORAL DAILY
COMMUNITY
Start: 2022-09-05 | End: 2022-10-19 | Stop reason: ALTCHOICE

## 2022-09-16 RX ORDER — PANTOPRAZOLE SODIUM 20 MG/1
20 TABLET, DELAYED RELEASE ORAL DAILY
COMMUNITY
Start: 2022-09-13 | End: 2022-10-19 | Stop reason: ALTCHOICE

## 2022-09-16 RX ORDER — CLARITHROMYCIN 500 MG/1
500 TABLET, FILM COATED ORAL DAILY
COMMUNITY
Start: 2022-09-13 | End: 2022-10-19 | Stop reason: ALTCHOICE

## 2022-09-16 RX ORDER — AMOXICILLIN 500 MG/1
500 CAPSULE ORAL EVERY 12 HOURS
COMMUNITY
Start: 2022-09-13 | End: 2022-10-19 | Stop reason: ALTCHOICE

## 2022-09-16 NOTE — TELEPHONE ENCOUNTER
----- Message from Jackie Tiwari sent at 9/16/2022 12:02 PM CDT -----  Regarding: refill  Pt needs refill on HYDROcodone-acetaminophen (NORCO)  mg per tablet sent to [facility] in [place].

## 2022-09-20 ENCOUNTER — TELEPHONE (OUTPATIENT)
Dept: RHEUMATOLOGY | Facility: CLINIC | Age: 62
End: 2022-09-20
Payer: MEDICARE

## 2022-09-20 ENCOUNTER — TELEPHONE (OUTPATIENT)
Dept: GASTROENTEROLOGY | Facility: CLINIC | Age: 62
End: 2022-09-20
Payer: MEDICARE

## 2022-09-20 NOTE — TELEPHONE ENCOUNTER
Spoke to patient. Informed her that we are waiting for Dr. Sierra to sign off on her Norco prescription.

## 2022-09-20 NOTE — TELEPHONE ENCOUNTER
Spoke to patient. Instructed to continue Pylera and to discontinue medications prescribed by PCP-verbalizes understanding. She specifically mentions that she will discontinue Pantoprazole. Recommended a daily probiotic and informed her that she can find a probiotic at any drug store. Verbalizes understanding. Instructed to call if diarrhea continues without improvement-verbalizes understanding. Explained the importance of washing hands with soap and water after using the bathroom.

## 2022-09-20 NOTE — TELEPHONE ENCOUNTER
Spoke with pt, states that from 9/14/22-9/17/22 she was taking Pylera, prescribed by Steffany and Bibecky,pantoprazole, and amoxicillin from Dr. Chin. Complains of severe diarrhea and urgency leading to accidents. Pt states it was only liquid consistency. After reading the bottles, she stopped taking the medication from Dr. Chin but she is still having watery stools. Also having nausea, calmed by ginger ale. Pt is inquiring if full treatment is required or if any other medication that won't make her as sick can be sent. I did inform pt that she should eat something with each dose to help with nausea and that the 10 day treatment is important to complete to make sure the bacteria is cleared. Please advise if anything else would benefit pt.      ----- Message from Jackie Tiwari sent at 9/19/2022 12:54 PM CDT -----  Pt called to speak to nurse about medication Pylera that was prescribed. Pt states that it makes her nauseated and diarrhea.  Pt # [phone]

## 2022-09-20 NOTE — TELEPHONE ENCOUNTER
Refer to telephone encounter from 9/16/22 for details. Spoke to patient and informed her that we are waiting for Dr. Sierra to sign off on the Norco prescription.    I also addressed her GI encounter from today. See encounter.

## 2022-09-20 NOTE — TELEPHONE ENCOUNTER
----- Message from Dipti Jones sent at 9/20/2022 12:21 PM CDT -----  Regarding: med refill  Pt called for a refill of HYDROCODONE to be sent to [facility] [place]. Pt can be reached  @ [phone]      Thank You  Sanam BANEGAS

## 2022-09-20 NOTE — TELEPHONE ENCOUNTER
Please have her continue Pylera prescription and stop medications prescribed by PCP. I would recommend a daily probiotic. Please have her notify us if the diarrhea continues and does not continue to improve as a stool for c-diff will be ordered. Please provide ER precautions. Thanks.

## 2022-09-21 RX ORDER — HYDROCODONE BITARTRATE AND ACETAMINOPHEN 10; 325 MG/1; MG/1
1 TABLET ORAL 2 TIMES DAILY PRN
Qty: 60 TABLET | Refills: 0 | Status: SHIPPED | OUTPATIENT
Start: 2022-09-21 | End: 2022-10-19 | Stop reason: SDUPTHER

## 2022-10-19 ENCOUNTER — OFFICE VISIT (OUTPATIENT)
Dept: RHEUMATOLOGY | Facility: CLINIC | Age: 62
End: 2022-10-19
Payer: MEDICARE

## 2022-10-19 VITALS
WEIGHT: 240.19 LBS | TEMPERATURE: 98 F | OXYGEN SATURATION: 98 % | RESPIRATION RATE: 18 BRPM | SYSTOLIC BLOOD PRESSURE: 154 MMHG | DIASTOLIC BLOOD PRESSURE: 93 MMHG | HEART RATE: 71 BPM | HEIGHT: 62 IN | BODY MASS INDEX: 44.2 KG/M2

## 2022-10-19 DIAGNOSIS — K21.9 GASTROESOPHAGEAL REFLUX DISEASE WITHOUT ESOPHAGITIS: ICD-10-CM

## 2022-10-19 DIAGNOSIS — G47.00 INSOMNIA, UNSPECIFIED TYPE: ICD-10-CM

## 2022-10-19 DIAGNOSIS — F51.01 PRIMARY INSOMNIA: ICD-10-CM

## 2022-10-19 DIAGNOSIS — M05.9 SEROPOSITIVE RHEUMATOID ARTHRITIS: Primary | ICD-10-CM

## 2022-10-19 DIAGNOSIS — M79.7 FIBROMYALGIA: ICD-10-CM

## 2022-10-19 DIAGNOSIS — K59.09 CHRONIC CONSTIPATION: ICD-10-CM

## 2022-10-19 DIAGNOSIS — Z87.898 H/O THYMOMA: ICD-10-CM

## 2022-10-19 PROBLEM — M19.90 ARTHRITIS: Status: ACTIVE | Noted: 2022-10-19

## 2022-10-19 PROCEDURE — 3077F PR MOST RECENT SYSTOLIC BLOOD PRESSURE >= 140 MM HG: ICD-10-PCS | Mod: CPTII,,, | Performed by: INTERNAL MEDICINE

## 2022-10-19 PROCEDURE — 99214 PR OFFICE/OUTPT VISIT, EST, LEVL IV, 30-39 MIN: ICD-10-PCS | Mod: S$PBB,,, | Performed by: INTERNAL MEDICINE

## 2022-10-19 PROCEDURE — 1159F PR MEDICATION LIST DOCUMENTED IN MEDICAL RECORD: ICD-10-PCS | Mod: CPTII,,, | Performed by: INTERNAL MEDICINE

## 2022-10-19 PROCEDURE — 1159F MED LIST DOCD IN RCRD: CPT | Mod: CPTII,,, | Performed by: INTERNAL MEDICINE

## 2022-10-19 PROCEDURE — 99214 OFFICE O/P EST MOD 30 MIN: CPT | Mod: S$PBB,,, | Performed by: INTERNAL MEDICINE

## 2022-10-19 PROCEDURE — 3080F PR MOST RECENT DIASTOLIC BLOOD PRESSURE >= 90 MM HG: ICD-10-PCS | Mod: CPTII,,, | Performed by: INTERNAL MEDICINE

## 2022-10-19 PROCEDURE — 3080F DIAST BP >= 90 MM HG: CPT | Mod: CPTII,,, | Performed by: INTERNAL MEDICINE

## 2022-10-19 PROCEDURE — 99214 OFFICE O/P EST MOD 30 MIN: CPT | Mod: PBBFAC | Performed by: INTERNAL MEDICINE

## 2022-10-19 PROCEDURE — 3077F SYST BP >= 140 MM HG: CPT | Mod: CPTII,,, | Performed by: INTERNAL MEDICINE

## 2022-10-19 RX ORDER — HYDROCODONE BITARTRATE AND ACETAMINOPHEN 10; 325 MG/1; MG/1
1 TABLET ORAL
Qty: 90 TABLET | Refills: 0 | Status: SHIPPED | OUTPATIENT
Start: 2022-10-19 | End: 2022-11-17 | Stop reason: SDUPTHER

## 2022-10-19 RX ORDER — TIZANIDINE 4 MG/1
4 TABLET ORAL NIGHTLY
Qty: 30 TABLET | Refills: 5 | Status: SHIPPED | OUTPATIENT
Start: 2022-10-19 | End: 2023-03-22 | Stop reason: SDUPTHER

## 2022-10-19 RX ORDER — PREDNISONE 5 MG/1
TABLET ORAL
Qty: 70 TABLET | Refills: 0 | Status: SHIPPED | OUTPATIENT
Start: 2022-10-19 | End: 2022-11-04

## 2022-10-19 RX ORDER — METHOTREXATE 2.5 MG/1
TABLET ORAL
Qty: 30 TABLET | Refills: 5 | Status: SHIPPED | OUTPATIENT
Start: 2022-10-19 | End: 2023-03-22 | Stop reason: SDUPTHER

## 2022-10-19 RX ORDER — OMEPRAZOLE 40 MG/1
40 CAPSULE, DELAYED RELEASE ORAL EVERY MORNING
Qty: 30 CAPSULE | Refills: 5 | Status: SHIPPED | OUTPATIENT
Start: 2022-10-19 | End: 2023-03-22 | Stop reason: SDUPTHER

## 2022-10-19 RX ORDER — HYDROXYCHLOROQUINE SULFATE 200 MG/1
200 TABLET, FILM COATED ORAL 2 TIMES DAILY
Qty: 60 TABLET | Refills: 5 | Status: SHIPPED | OUTPATIENT
Start: 2022-10-19 | End: 2023-03-22 | Stop reason: SDUPTHER

## 2022-10-19 RX ORDER — FOLIC ACID 1 MG/1
1000 TABLET ORAL DAILY
Qty: 30 TABLET | Refills: 5 | Status: SHIPPED | OUTPATIENT
Start: 2022-10-19 | End: 2023-03-22 | Stop reason: SDUPTHER

## 2022-10-19 RX ORDER — GABAPENTIN 300 MG/1
300 CAPSULE ORAL 3 TIMES DAILY
Qty: 270 CAPSULE | Refills: 5 | Status: SHIPPED | OUTPATIENT
Start: 2022-10-19 | End: 2023-03-22 | Stop reason: SDUPTHER

## 2022-10-19 NOTE — PROGRESS NOTES
"Subjective:       Patient ID: Mary Lou Perez is a 61 y.o. female.    Chief Complaint: Pain not well managed    The patient is complaining of joint pain involving the MCP PIP wrist elbow shoulders hips knees and ankles bilaterally.  The pain is 9/10 in intensity dull in quality and continuous.  That is associated with a morning stiffness lasting for more than 60 minutes.  Is also having difficulty maintaining a good night of sleep.  This has been associated with myalgias.  Muscle aches are 9/10 in intensity dull in quality and continuous.  They are associated with fatigue.  No fever no chills no others.      Review of Systems   Constitutional:  Negative for appetite change, chills and fever.   HENT:  Negative for congestion, ear pain, mouth sores, nosebleeds and trouble swallowing.    Eyes:  Negative for photophobia and discharge.   Respiratory:  Negative for chest tightness and shortness of breath.    Cardiovascular:  Negative for chest pain.   Gastrointestinal:  Negative for abdominal pain and vomiting.   Endocrine: Negative.    Genitourinary:  Negative for hematuria.   Musculoskeletal:         As per HPI   Skin:  Negative for rash.   Neurological:  Negative for weakness.       Objective:   BP (!) 154/93 (BP Location: Left arm, Patient Position: Sitting, BP Method: Large (Manual)) Comment (BP Method): Didn't take BP meds today-diuretic  Pulse 71   Temp 98.1 °F (36.7 °C) (Oral)   Resp 18   Ht 5' 2" (1.575 m)   Wt 109 kg (240 lb 3.2 oz)   SpO2 98%   BMI 43.93 kg/m²      Physical Exam   Constitutional: She is oriented to person, place, and time. She appears well-developed and well-nourished. No distress.   HENT:   Head: Normocephalic and atraumatic.   Right Ear: External ear normal.   Left Ear: External ear normal.   Eyes: Pupils are equal, round, and reactive to light.   Cardiovascular: Normal rate, regular rhythm and normal heart sounds.   Pulmonary/Chest: Breath sounds normal.   Abdominal: Soft. " There is no abdominal tenderness.   Musculoskeletal:      Right shoulder: Tenderness present.      Left shoulder: Tenderness present.      Right elbow: Tenderness present.      Left elbow: Tenderness present.      Right wrist: Tenderness present.      Left wrist: Tenderness present.      Cervical back: Neck supple.      Right hip: Tenderness present.      Left hip: Tenderness present.      Right knee: Tenderness present.      Left knee: Tenderness present.      Right ankle: Tenderness present.      Left ankle: Tenderness present.   Lymphadenopathy:     She has no cervical adenopathy.   Neurological: She is alert and oriented to person, place, and time. She displays normal reflexes. No cranial nerve deficit or sensory deficit. She exhibits normal muscle tone. Coordination normal.   Skin: No rash noted. No erythema.   Vitals reviewed.      Right Side Rheumatological Exam     The patient is tender to palpation of the shoulder, elbow, wrist, knee, 1st PIP, 1st MCP, 2nd PIP, 2nd MCP, 3rd PIP, 3rd MCP, 4th PIP, 4th MCP, 5th PIP, hip, ankle, 1st MTP, 2nd MTP, 3rd MTP, 4th MTP, 5th MTP, 1st toe IP, 2nd toe IP, 3rd toe IP, 4th toe IP and 5th toe IP    Left Side Rheumatological Exam     The patient is tender to palpation of the shoulder, elbow, wrist, knee, 1st PIP, 1st MCP, 2nd PIP, 2nd MCP, 3rd PIP, 3rd MCP, 4th PIP, 4th MCP, 5th PIP, 5th MCP, hip, ankle, 1st MTP, 2nd MTP, 3rd MTP, 4th MTP, 5th MTP, 1st toe IP, 2nd toe IP, 3rd toe IP, 4th toe IP and 5th toe IP.       Completed Fibromyalgia exam 18/18 tender points.  No data to display     Assessment:       1. Seropositive rheumatoid arthritis    2. Fibromyalgia    3. Gastroesophageal reflux disease without esophagitis    4. Insomnia, unspecified type    5. Chronic constipation    6. H/O thymoma    7. Primary insomnia              Plan:       Problem List Items Addressed This Visit          Immunology/Multi System    Seropositive rheumatoid arthritis - Primary    Relevant  Medications    predniSONE (DELTASONE) 5 MG tablet    folic acid (FOLVITE) 1 MG tablet    gabapentin (NEURONTIN) 300 MG capsule    hydrOXYchloroQUINE (PLAQUENIL) 200 mg tablet    methotrexate 2.5 MG Tab    omeprazole (PRILOSEC) 40 MG capsule    tiZANidine (ZANAFLEX) 4 MG tablet    HYDROcodone-acetaminophen (NORCO)  mg per tablet       GI    GERD (gastroesophageal reflux disease)    Relevant Medications    predniSONE (DELTASONE) 5 MG tablet    folic acid (FOLVITE) 1 MG tablet    gabapentin (NEURONTIN) 300 MG capsule    hydrOXYchloroQUINE (PLAQUENIL) 200 mg tablet    methotrexate 2.5 MG Tab    omeprazole (PRILOSEC) 40 MG capsule    tiZANidine (ZANAFLEX) 4 MG tablet    HYDROcodone-acetaminophen (NORCO)  mg per tablet    Chronic constipation    Relevant Medications    predniSONE (DELTASONE) 5 MG tablet    folic acid (FOLVITE) 1 MG tablet    gabapentin (NEURONTIN) 300 MG capsule    hydrOXYchloroQUINE (PLAQUENIL) 200 mg tablet    methotrexate 2.5 MG Tab    omeprazole (PRILOSEC) 40 MG capsule    tiZANidine (ZANAFLEX) 4 MG tablet    HYDROcodone-acetaminophen (NORCO)  mg per tablet       Orthopedic    Fibromyalgia    Relevant Medications    predniSONE (DELTASONE) 5 MG tablet    folic acid (FOLVITE) 1 MG tablet    gabapentin (NEURONTIN) 300 MG capsule    hydrOXYchloroQUINE (PLAQUENIL) 200 mg tablet    methotrexate 2.5 MG Tab    omeprazole (PRILOSEC) 40 MG capsule    tiZANidine (ZANAFLEX) 4 MG tablet    HYDROcodone-acetaminophen (NORCO)  mg per tablet       Other    Insomnia    Relevant Medications    predniSONE (DELTASONE) 5 MG tablet    folic acid (FOLVITE) 1 MG tablet    gabapentin (NEURONTIN) 300 MG capsule    hydrOXYchloroQUINE (PLAQUENIL) 200 mg tablet    methotrexate 2.5 MG Tab    omeprazole (PRILOSEC) 40 MG capsule    tiZANidine (ZANAFLEX) 4 MG tablet    HYDROcodone-acetaminophen (NORCO)  mg per tablet     Other Visit Diagnoses       H/O thymoma        Relevant Medications    predniSONE  (DELTASONE) 5 MG tablet    folic acid (FOLVITE) 1 MG tablet    gabapentin (NEURONTIN) 300 MG capsule    hydrOXYchloroQUINE (PLAQUENIL) 200 mg tablet    methotrexate 2.5 MG Tab    omeprazole (PRILOSEC) 40 MG capsule    tiZANidine (ZANAFLEX) 4 MG tablet    HYDROcodone-acetaminophen (NORCO)  mg per tablet

## 2022-11-17 DIAGNOSIS — M79.7 FIBROMYALGIA: ICD-10-CM

## 2022-11-17 DIAGNOSIS — K59.09 CHRONIC CONSTIPATION: ICD-10-CM

## 2022-11-17 DIAGNOSIS — G47.00 INSOMNIA, UNSPECIFIED TYPE: ICD-10-CM

## 2022-11-17 DIAGNOSIS — F51.01 PRIMARY INSOMNIA: ICD-10-CM

## 2022-11-17 DIAGNOSIS — K21.9 GASTROESOPHAGEAL REFLUX DISEASE WITHOUT ESOPHAGITIS: ICD-10-CM

## 2022-11-17 DIAGNOSIS — M05.9 SEROPOSITIVE RHEUMATOID ARTHRITIS: Primary | ICD-10-CM

## 2022-11-17 DIAGNOSIS — Z87.898 H/O THYMOMA: ICD-10-CM

## 2022-11-17 RX ORDER — HYDROCODONE BITARTRATE AND ACETAMINOPHEN 10; 325 MG/1; MG/1
1 TABLET ORAL
Qty: 90 TABLET | Refills: 0 | Status: SHIPPED | OUTPATIENT
Start: 2022-11-17 | End: 2022-11-22 | Stop reason: SDUPTHER

## 2022-11-17 NOTE — TELEPHONE ENCOUNTER
----- Message from Linda Rodrigez sent at 11/17/2022  9:05 AM CST -----  Regarding: Medication Management  Pt is in need of RX NORCO  mg. Pt  needs it sent to [facility] [place].    Thank You

## 2022-11-21 ENCOUNTER — TELEPHONE (OUTPATIENT)
Dept: RHEUMATOLOGY | Facility: CLINIC | Age: 62
End: 2022-11-21
Payer: MEDICARE

## 2022-11-21 NOTE — TELEPHONE ENCOUNTER
----- Message from Jackie Tiwari sent at 11/21/2022  8:42 AM CST -----  Pt needs refill on [place] [address]. Pt uses [facility] on [address] in [place].   Pt # [phone]

## 2022-11-21 NOTE — TELEPHONE ENCOUNTER
Pt called 11/21 requesting her narco refill.  Called pt to let her know that her NARCO refill was sent to the [facility] pharmacy on the day of her appointment(11/19). She confirmed understanding and will be picking it up soon.

## 2022-11-22 ENCOUNTER — TELEPHONE (OUTPATIENT)
Dept: RHEUMATOLOGY | Facility: CLINIC | Age: 62
End: 2022-11-22

## 2022-11-22 DIAGNOSIS — M05.9 SEROPOSITIVE RHEUMATOID ARTHRITIS: Primary | ICD-10-CM

## 2022-11-22 DIAGNOSIS — M79.7 FIBROMYALGIA: ICD-10-CM

## 2022-11-22 NOTE — TELEPHONE ENCOUNTER
----- Message from Dipti Jones sent at 11/21/2022  2:57 PM CST -----  Regarding: [facility] out of medication  Pt called and stated that Dr. Sierra sent in an rx of HYDROCODONE [facility] [place] and [facility] does not [facility] and does not know when they will get it it. Pt asked if it can be sent to [facility] in [place] on [email or web address]. Pt can be reached @ [phone]           Thank You  Sanam BANEGAS

## 2022-11-22 NOTE — TELEPHONE ENCOUNTER
----- Message from Lucy Samuel sent at 11/22/2022  1:48 PM CST -----  Regarding: Medication refill  Patient called stating that [facility] in [place] do not have the Norco. Patient has requested that script for [place] be sent to [facility] [address] in [place]. She is out of her medication and in a lot of pain.

## 2022-11-23 NOTE — TELEPHONE ENCOUNTER
Pt called re: Norco script being transferred to [facility]  Explained script has been entered by doctor needs to sign and he's on vacation  No one else able to sign script    Called [facility] to see if they can transfer script  Pharmacist said they can't transfer controlled substances    Called pt back and explained this and that doctor should sign off on script this Friday

## 2022-11-25 DIAGNOSIS — M05.9 SEROPOSITIVE RHEUMATOID ARTHRITIS: ICD-10-CM

## 2022-11-25 DIAGNOSIS — F51.01 PRIMARY INSOMNIA: ICD-10-CM

## 2022-11-25 DIAGNOSIS — M79.7 FIBROMYALGIA: ICD-10-CM

## 2022-11-25 RX ORDER — HYDROCODONE BITARTRATE AND ACETAMINOPHEN 10; 325 MG/1; MG/1
1 TABLET ORAL
Qty: 90 TABLET | Refills: 0 | Status: SHIPPED | OUTPATIENT
Start: 2022-11-25 | End: 2022-12-28 | Stop reason: SDUPTHER

## 2022-12-02 RX ORDER — ADALIMUMAB 40MG/0.4ML
KIT SUBCUTANEOUS
Refills: 11 | OUTPATIENT
Start: 2022-12-02

## 2022-12-07 ENCOUNTER — OFFICE VISIT (OUTPATIENT)
Dept: INTERNAL MEDICINE | Facility: CLINIC | Age: 62
End: 2022-12-07
Payer: MEDICARE

## 2022-12-07 ENCOUNTER — LAB VISIT (OUTPATIENT)
Dept: LAB | Facility: HOSPITAL | Age: 62
End: 2022-12-07
Attending: STUDENT IN AN ORGANIZED HEALTH CARE EDUCATION/TRAINING PROGRAM
Payer: MEDICARE

## 2022-12-07 VITALS
HEART RATE: 80 BPM | SYSTOLIC BLOOD PRESSURE: 124 MMHG | RESPIRATION RATE: 18 BRPM | TEMPERATURE: 98 F | WEIGHT: 242 LBS | DIASTOLIC BLOOD PRESSURE: 81 MMHG | HEIGHT: 62 IN | BODY MASS INDEX: 44.53 KG/M2

## 2022-12-07 DIAGNOSIS — R10.13 EPIGASTRIC ABDOMINAL PAIN: ICD-10-CM

## 2022-12-07 DIAGNOSIS — E55.9 VITAMIN D DEFICIENCY: ICD-10-CM

## 2022-12-07 DIAGNOSIS — E04.1 THYROID NODULE: ICD-10-CM

## 2022-12-07 DIAGNOSIS — A04.8 H. PYLORI INFECTION: ICD-10-CM

## 2022-12-07 DIAGNOSIS — M85.80 OSTEOPENIA, UNSPECIFIED LOCATION: ICD-10-CM

## 2022-12-07 DIAGNOSIS — Z23 NEED FOR INFLUENZA VACCINATION: Primary | ICD-10-CM

## 2022-12-07 DIAGNOSIS — M81.0 OSTEOPOROSIS, UNSPECIFIED OSTEOPOROSIS TYPE, UNSPECIFIED PATHOLOGICAL FRACTURE PRESENCE: ICD-10-CM

## 2022-12-07 DIAGNOSIS — Z23 NEED FOR INFLUENZA VACCINATION: ICD-10-CM

## 2022-12-07 DIAGNOSIS — Z23 NEED FOR ZOSTER VACCINE: ICD-10-CM

## 2022-12-07 DIAGNOSIS — K21.9 GASTROESOPHAGEAL REFLUX DISEASE, UNSPECIFIED WHETHER ESOPHAGITIS PRESENT: ICD-10-CM

## 2022-12-07 DIAGNOSIS — Z12.31 BREAST CANCER SCREENING BY MAMMOGRAM: ICD-10-CM

## 2022-12-07 LAB
ALBUMIN SERPL-MCNC: 3.5 GM/DL (ref 3.4–4.8)
ALBUMIN/GLOB SERPL: 0.9 RATIO (ref 1.1–2)
ALP SERPL-CCNC: 89 UNIT/L (ref 40–150)
ALT SERPL-CCNC: 11 UNIT/L (ref 0–55)
AST SERPL-CCNC: 19 UNIT/L (ref 5–34)
BASOPHILS # BLD AUTO: 0.11 X10(3)/MCL (ref 0–0.2)
BASOPHILS NFR BLD AUTO: 1.7 %
BILIRUBIN DIRECT+TOT PNL SERPL-MCNC: 0.6 MG/DL
BUN SERPL-MCNC: 13.1 MG/DL (ref 9.8–20.1)
CALCIUM SERPL-MCNC: 10 MG/DL (ref 8.4–10.2)
CHLORIDE SERPL-SCNC: 106 MMOL/L (ref 98–107)
CO2 SERPL-SCNC: 27 MMOL/L (ref 23–31)
CREAT SERPL-MCNC: 0.91 MG/DL (ref 0.55–1.02)
DEPRECATED CALCIDIOL+CALCIFEROL SERPL-MC: 44.7 NG/ML (ref 30–80)
EOSINOPHIL # BLD AUTO: 0.11 X10(3)/MCL (ref 0–0.9)
EOSINOPHIL NFR BLD AUTO: 1.7 %
ERYTHROCYTE [DISTWIDTH] IN BLOOD BY AUTOMATED COUNT: 13.6 % (ref 11.5–17)
GFR SERPLBLD CREATININE-BSD FMLA CKD-EPI: >60 MLS/MIN/1.73/M2
GLOBULIN SER-MCNC: 4.1 GM/DL (ref 2.4–3.5)
GLUCOSE SERPL-MCNC: 88 MG/DL (ref 82–115)
HCT VFR BLD AUTO: 36.9 % (ref 37–47)
HGB BLD-MCNC: 12.2 GM/DL (ref 12–16)
IMM GRANULOCYTES # BLD AUTO: 0.02 X10(3)/MCL (ref 0–0.04)
IMM GRANULOCYTES NFR BLD AUTO: 0.3 %
LYMPHOCYTES # BLD AUTO: 1.68 X10(3)/MCL (ref 0.6–4.6)
LYMPHOCYTES NFR BLD AUTO: 26.2 %
MCH RBC QN AUTO: 29.3 PG (ref 27–31)
MCHC RBC AUTO-ENTMCNC: 33.1 MG/DL (ref 33–36)
MCV RBC AUTO: 88.7 FL (ref 80–94)
MONOCYTES # BLD AUTO: 0.6 X10(3)/MCL (ref 0.1–1.3)
MONOCYTES NFR BLD AUTO: 9.3 %
NEUTROPHILS # BLD AUTO: 3.9 X10(3)/MCL (ref 2.1–9.2)
NEUTROPHILS NFR BLD AUTO: 60.8 %
NRBC BLD AUTO-RTO: 0 %
PLATELET # BLD AUTO: 329 X10(3)/MCL (ref 130–400)
PMV BLD AUTO: 10.3 FL (ref 7.4–10.4)
POTASSIUM SERPL-SCNC: 4.1 MMOL/L (ref 3.5–5.1)
PROT SERPL-MCNC: 7.6 GM/DL (ref 5.8–7.6)
RBC # BLD AUTO: 4.16 X10(6)/MCL (ref 4.2–5.4)
SODIUM SERPL-SCNC: 142 MMOL/L (ref 136–145)
T3FREE SERPL-MCNC: 3.06 PG/ML (ref 1.57–3.91)
T4 FREE SERPL-MCNC: 0.97 NG/DL (ref 0.7–1.48)
TSH SERPL-ACNC: 4.07 UIU/ML (ref 0.35–4.94)
WBC # SPEC AUTO: 6.4 X10(3)/MCL (ref 4.5–11.5)

## 2022-12-07 PROCEDURE — 84439 ASSAY OF FREE THYROXINE: CPT

## 2022-12-07 PROCEDURE — 84443 ASSAY THYROID STIM HORMONE: CPT

## 2022-12-07 PROCEDURE — 82306 VITAMIN D 25 HYDROXY: CPT

## 2022-12-07 PROCEDURE — 99215 OFFICE O/P EST HI 40 MIN: CPT | Mod: PBBFAC,25

## 2022-12-07 PROCEDURE — 36415 COLL VENOUS BLD VENIPUNCTURE: CPT

## 2022-12-07 PROCEDURE — G0008 ADMIN INFLUENZA VIRUS VAC: HCPCS | Mod: PBBFAC

## 2022-12-07 PROCEDURE — 84481 FREE ASSAY (FT-3): CPT

## 2022-12-07 PROCEDURE — 85025 COMPLETE CBC W/AUTO DIFF WBC: CPT

## 2022-12-07 PROCEDURE — 80053 COMPREHEN METABOLIC PANEL: CPT

## 2022-12-07 RX ORDER — MECLIZINE HYDROCHLORIDE 25 MG/1
25 TABLET ORAL 3 TIMES DAILY PRN
Qty: 30 TABLET | Refills: 2 | Status: SHIPPED | OUTPATIENT
Start: 2022-12-07

## 2022-12-07 NOTE — PROGRESS NOTES
[facility]    Subjective:      61-year-old with past medical history of rheumatoid arthritis, fibromyalgia, thymoma status post removal, Graves' disease status post thyroidectomy on Synthroid, history of GERD, hypertension, pulmonary nodules today for follow-up. Following up with Dr. Lynch. No complaints today.     Review of Systems:  10 point ROS negative except for HPI    Objective:   Vital Signs:  Vitals:    12/07/22 1237   BP: 124/81   Pulse: 80   Resp: 18   Temp: 98.1 °F (36.7 °C)      General: well-developed well-nourished in no acute distress  Neck: full range of motion, no thyromegaly or lymphadenopathy  Respiratory: clear to auscultation bilaterally  Cardiovascular: regular rate and rhythm without murmurs, gallops or rubs  Gastrointestinal: soft, non-tender, non-distended with normal bowel sounds, without masses to palpation  Musculoskeletal: MCP PIP wrist elbows shoulders hips knees and ankles tender bilaterally.  Integumentary: no rashes or skin lesions present  Breast: No lumps or nodules noted or tender on palpation  Neurologic: cranial nerves intact, no signs of peripheral neurological deficit, motor/sensory function intact      Body mass index is 44.26 kg/m².         Laboratory:  Lab Results   Component Value Date    WBC 6.4 12/07/2022    HGB 12.2 12/07/2022    HCT 36.9 (L) 12/07/2022     12/07/2022    MCV 88.7 12/07/2022    RDW 13.6 12/07/2022    Lab Results   Component Value Date     12/07/2022    K 4.1 12/07/2022    CO2 27 12/07/2022    BUN 13.1 12/07/2022    CREATININE 0.91 12/07/2022    CALCIUM 10.0 12/07/2022      Lab Results   Component Value Date    HGBA1C 4.8 04/07/2022    EAG 91.1 04/07/2022    CREATININE 0.91 12/07/2022    Lab Results   Component Value Date    TSH 2.5940 04/07/2022    FDQPWG7XPCM 1.02 04/07/2022                Current Medications:  Current Outpatient Medications   Medication Instructions    albuterol (PROVENTIL/VENTOLIN HFA) 90  mcg/actuation inhaler 2 puffs, Inhalation, Every 6 hours PRN    fluticasone-salmeterol diskus inhaler 500-50 mcg 1 puff, Inhalation, 2 times daily, Controller    folic acid (FOLVITE) 1,000 mcg, Oral, Daily    gabapentin (NEURONTIN) 300 mg, Oral, 3 times daily    HUMIRA(CF) PEN 40 mg, Subcutaneous, Every 14 days    HYDROcodone-acetaminophen (NORCO)  mg per tablet 1 tablet, Oral, 3 times daily after meals PRN    hydrOXYchloroQUINE (PLAQUENIL) 200 mg, Oral, 2 times daily    levothyroxine (SYNTHROID) 125 mcg, Oral, Daily    methotrexate 2.5 MG Tab TAKE 3 TABLETS BY MOUTH AFTER LUNCH AND TAKE 3 TABLETS AFTER SUPPER ON TUESDAY S    montelukast (SINGULAIR) 10 mg, Oral, Daily    nitroGLYCERIN (NITROSTAT) 0.4 MG SL tablet DISSOLVE ONE TABLET UNDER THE TONGUE EVERY 5 MINUTES AS NEEDED FOR CHEST PAIN. DO NOT EXCEED A TOTAL OF 3 DOSES IN 15 MINUTES. IF PAIN PERSISTS, SEEK MEDICAL ATTENTION    omeprazole (PRILOSEC) 40 mg, Oral, Every morning    tiZANidine (ZANAFLEX) 4 mg, Oral, Nightly    triamterene-hydrochlorothiazide 37.5-25 mg (MAXZIDE-25) 37.5-25 mg per tablet 1 tablet, Oral, Daily        Assessment and Plan:      Seropositive rheumatoid arthritis  FRANK positive, rheumatoid factor positive, CCP positive  Continue Plaquenil 200 mg twice daily  Completed prednisone taper  Continue methotrexate 15 mg and folic acid daily   Continue tizanidine, gabapentin and norco  for pain   Continue following Rheum with Dr. Lynch( last seen 10/19/22)  Pt is on humira; recommended pt to ask Dr. Lynch  whether she needs to be on it given his notes does not mention it    Fibromyalgia  Denies any muscle pain or body pain at this time  Continue tizanidine, gabapentin and norco  for pain     Thymoma status post thymectomy 2014  Denies chest pain, hoarse voice, swelling in the face, neck, upper body arms at this time  Was Following ENT , LAST SEEN AT 9/3/20, continue Flonase, antihistamine per ENT  Referral to ENT sent      Graves' disease status post thyroidectomy  On Synthroid daily  Denies heart palpitations  Endorses weight gain, constipation and dry skin  ENT referred for a thyroid US with results below :  Ultrasound thyroid on 11/02/2020 shows right thyroidectomy.  1 cm left-sided thyroid nodule.  Hypervascular left level thyroid consistent with thyroiditis.  TFTS wnl 4/22  Repeat TFTS and Thyroid US ordered  Referral to ENT sent given she lost to follow up       CAD   [facility] in [facility];No interventions done per pt  Was following cards in the past   Last seen cardiologist on 09/30/2020  Lexiscan stress test on 06/24/2020 - for ischemia  Echo done on 08/07/2020 shows left undergo ejection fraction 55 to 60%  Denies any chest pain or SOB at this time    GERD  Patient used to follow up with GI before but lost in touch when covid hits  H. pylori gastritis confirmed by biopsy January 2020, completed antibiotics   fecal occult immunoassay for colon cancer screening was neg in 9/2021  switched from Nexium to Rabeprazole 40 daily at this time  Refused EGD appt scheduled on 11/22 with GI       Hypertension- controlled  Continue HCTZ-traimterene    Vertigo   Started on meclizine PRN       Flu shot today   mammogram UPTD   papsmear appt in 4/23  Refuses EGD and colonoscopy   Last DXA in 2020 showed osteopenia ; will repeat DXA scan given pt recently on steriods as well         Health Maintenance   Topic Date Due    Mammogram  09/06/2023    Lipid Panel  04/07/2027    TETANUS VACCINE  07/18/2030    Hepatitis C Screening  Completed                    Laura Chin DO

## 2022-12-28 DIAGNOSIS — M05.9 SEROPOSITIVE RHEUMATOID ARTHRITIS: ICD-10-CM

## 2022-12-28 DIAGNOSIS — M79.7 FIBROMYALGIA: ICD-10-CM

## 2022-12-28 NOTE — TELEPHONE ENCOUNTER
----- Message from Lucy Samuel sent at 12/28/2022 11:08 AM CST -----  Regarding: Medication Refill  Patient called requesting refill for Hydrocodone. [facility] on [address] in [place].

## 2023-01-03 RX ORDER — HYDROCODONE BITARTRATE AND ACETAMINOPHEN 10; 325 MG/1; MG/1
1 TABLET ORAL
Qty: 90 TABLET | Refills: 0 | Status: SHIPPED | OUTPATIENT
Start: 2023-01-03 | End: 2023-01-05 | Stop reason: SDUPTHER

## 2023-01-04 DIAGNOSIS — M79.7 FIBROMYALGIA: ICD-10-CM

## 2023-01-04 DIAGNOSIS — M05.9 SEROPOSITIVE RHEUMATOID ARTHRITIS: ICD-10-CM

## 2023-01-04 NOTE — TELEPHONE ENCOUNTER
----- Message from Lucy Samuel sent at 1/4/2023  2:15 PM CST -----  Regarding: Medication refill  Patient called requesting her refill for Hydrocodone be sent to [facility] [address] [facility] of [facility] in [place] because they are out of stock.

## 2023-01-05 ENCOUNTER — OFFICE VISIT (OUTPATIENT)
Dept: OTOLARYNGOLOGY | Facility: CLINIC | Age: 63
End: 2023-01-05
Payer: MEDICARE

## 2023-01-05 VITALS
SYSTOLIC BLOOD PRESSURE: 140 MMHG | BODY MASS INDEX: 43.98 KG/M2 | HEART RATE: 73 BPM | HEIGHT: 62 IN | WEIGHT: 239 LBS | TEMPERATURE: 98 F | DIASTOLIC BLOOD PRESSURE: 96 MMHG

## 2023-01-05 DIAGNOSIS — E05.00 GRAVES DISEASE: ICD-10-CM

## 2023-01-05 DIAGNOSIS — E04.1 THYROID NODULE: Primary | ICD-10-CM

## 2023-01-05 PROCEDURE — 3077F PR MOST RECENT SYSTOLIC BLOOD PRESSURE >= 140 MM HG: ICD-10-PCS | Mod: CPTII,,, | Performed by: NURSE PRACTITIONER

## 2023-01-05 PROCEDURE — 99214 OFFICE O/P EST MOD 30 MIN: CPT | Mod: PBBFAC | Performed by: NURSE PRACTITIONER

## 2023-01-05 PROCEDURE — 1159F PR MEDICATION LIST DOCUMENTED IN MEDICAL RECORD: ICD-10-PCS | Mod: CPTII,,, | Performed by: NURSE PRACTITIONER

## 2023-01-05 PROCEDURE — 99213 OFFICE O/P EST LOW 20 MIN: CPT | Mod: S$PBB,,, | Performed by: NURSE PRACTITIONER

## 2023-01-05 PROCEDURE — 99213 PR OFFICE/OUTPT VISIT, EST, LEVL III, 20-29 MIN: ICD-10-PCS | Mod: S$PBB,,, | Performed by: NURSE PRACTITIONER

## 2023-01-05 PROCEDURE — 3008F PR BODY MASS INDEX (BMI) DOCUMENTED: ICD-10-PCS | Mod: CPTII,,, | Performed by: NURSE PRACTITIONER

## 2023-01-05 PROCEDURE — 3008F BODY MASS INDEX DOCD: CPT | Mod: CPTII,,, | Performed by: NURSE PRACTITIONER

## 2023-01-05 PROCEDURE — 1159F MED LIST DOCD IN RCRD: CPT | Mod: CPTII,,, | Performed by: NURSE PRACTITIONER

## 2023-01-05 PROCEDURE — 3077F SYST BP >= 140 MM HG: CPT | Mod: CPTII,,, | Performed by: NURSE PRACTITIONER

## 2023-01-05 PROCEDURE — 3080F DIAST BP >= 90 MM HG: CPT | Mod: CPTII,,, | Performed by: NURSE PRACTITIONER

## 2023-01-05 PROCEDURE — 3080F PR MOST RECENT DIASTOLIC BLOOD PRESSURE >= 90 MM HG: ICD-10-PCS | Mod: CPTII,,, | Performed by: NURSE PRACTITIONER

## 2023-01-05 RX ORDER — HYDROCODONE BITARTRATE AND ACETAMINOPHEN 10; 325 MG/1; MG/1
1 TABLET ORAL
Qty: 90 TABLET | Refills: 0 | Status: SHIPPED | OUTPATIENT
Start: 2023-01-05 | End: 2023-01-11 | Stop reason: SDUPTHER

## 2023-01-09 ENCOUNTER — TELEPHONE (OUTPATIENT)
Dept: RHEUMATOLOGY | Facility: CLINIC | Age: 63
End: 2023-01-09
Payer: MEDICARE

## 2023-01-09 ENCOUNTER — DOCUMENTATION ONLY (OUTPATIENT)
Dept: ADMINISTRATIVE | Facility: HOSPITAL | Age: 63
End: 2023-01-09
Payer: MEDICARE

## 2023-01-09 NOTE — TELEPHONE ENCOUNTER
----- Message from Lucy Samuel sent at 1/9/2023  3:45 PM CST -----  Regarding: Medication Clarification  [facility] called requesting clarification for patient medication. [phone]

## 2023-01-10 ENCOUNTER — HOSPITAL ENCOUNTER (OUTPATIENT)
Dept: RADIOLOGY | Facility: HOSPITAL | Age: 63
Discharge: HOME | End: 2023-01-10
Attending: STUDENT IN AN ORGANIZED HEALTH CARE EDUCATION/TRAINING PROGRAM
Payer: MEDICARE

## 2023-01-10 DIAGNOSIS — E04.1 THYROID NODULE: ICD-10-CM

## 2023-01-10 DIAGNOSIS — M85.80 OSTEOPENIA, UNSPECIFIED LOCATION: ICD-10-CM

## 2023-01-10 DIAGNOSIS — M81.0 OSTEOPOROSIS, UNSPECIFIED OSTEOPOROSIS TYPE, UNSPECIFIED PATHOLOGICAL FRACTURE PRESENCE: ICD-10-CM

## 2023-01-10 DIAGNOSIS — Z23 NEED FOR INFLUENZA VACCINATION: ICD-10-CM

## 2023-01-10 PROCEDURE — 76536 US EXAM OF HEAD AND NECK: CPT | Mod: TC

## 2023-01-10 PROCEDURE — 77080 DXA BONE DENSITY AXIAL: CPT | Mod: TC

## 2023-01-11 DIAGNOSIS — M79.7 FIBROMYALGIA: ICD-10-CM

## 2023-01-11 DIAGNOSIS — M05.9 SEROPOSITIVE RHEUMATOID ARTHRITIS: ICD-10-CM

## 2023-01-11 RX ORDER — HYDROCODONE BITARTRATE AND ACETAMINOPHEN 10; 325 MG/1; MG/1
1 TABLET ORAL EVERY 8 HOURS PRN
Qty: 90 TABLET | Refills: 0 | Status: SHIPPED | OUTPATIENT
Start: 2023-01-11 | End: 2023-02-07 | Stop reason: SDUPTHER

## 2023-01-11 NOTE — TELEPHONE ENCOUNTER
Lidia spoke with Pharmacy, she had Dr. Sierra resubmit Rx correctly to Pharmacy.  This was done today.

## 2023-02-07 DIAGNOSIS — M05.9 SEROPOSITIVE RHEUMATOID ARTHRITIS: ICD-10-CM

## 2023-02-07 DIAGNOSIS — M79.7 FIBROMYALGIA: ICD-10-CM

## 2023-02-07 RX ORDER — HYDROCODONE BITARTRATE AND ACETAMINOPHEN 10; 325 MG/1; MG/1
1 TABLET ORAL EVERY 8 HOURS PRN
Qty: 90 TABLET | Refills: 0 | Status: SHIPPED | OUTPATIENT
Start: 2023-02-07 | End: 2023-03-08 | Stop reason: SDUPTHER

## 2023-02-07 NOTE — TELEPHONE ENCOUNTER
----- Message from Lucy Samuel sent at 2/7/2023 10:28 AM CST -----  Regarding: Medication refill  Patient called requesting refill for  [facility] in [place]. Due 02.11.23

## 2023-02-17 ENCOUNTER — OFFICE VISIT (OUTPATIENT)
Dept: OTOLARYNGOLOGY | Facility: CLINIC | Age: 63
End: 2023-02-17
Payer: MEDICARE

## 2023-02-17 DIAGNOSIS — E04.1 THYROID NODULE: Primary | ICD-10-CM

## 2023-02-17 DIAGNOSIS — E05.00 GRAVES DISEASE: ICD-10-CM

## 2023-02-17 PROCEDURE — 99442 PR PHYSICIAN TELEPHONE EVALUATION 11-20 MIN: CPT | Mod: 95,,, | Performed by: NURSE PRACTITIONER

## 2023-02-17 PROCEDURE — 1159F MED LIST DOCD IN RCRD: CPT | Mod: CPTII,95,, | Performed by: NURSE PRACTITIONER

## 2023-02-17 PROCEDURE — 1159F PR MEDICATION LIST DOCUMENTED IN MEDICAL RECORD: ICD-10-PCS | Mod: CPTII,95,, | Performed by: NURSE PRACTITIONER

## 2023-02-17 PROCEDURE — 99442 PR PHYSICIAN TELEPHONE EVALUATION 11-20 MIN: ICD-10-PCS | Mod: 95,,, | Performed by: NURSE PRACTITIONER

## 2023-02-17 NOTE — PROGRESS NOTES
Audio Only Telehealth Visit     The patient location is: [facility]   The chief complaint leading to consultation is: f/u  Visit type: Virtual visit with audio only (telephone)  Total time spent on visit: 11 min     The reason for the audio only service rather than synchronous audio and video virtual visit was related to technical difficulties or patient preference/necessity.     Each patient to whom I provide medical services by telemedicine is:  (1) informed of the relationship between the physician and patient and the respective role of any other health care provider with respect to management of the patient; and (2) notified that they may decline to receive medical services by telemedicine and may withdraw from such care at any time. Patient verbally consented to receive this service via voice-only telephone call.       HPI:       01/05/2023: Referred back for left thyroid nodule. Nodule was biopsied 4/2019 with benign path. Pt has not had recent thyroid u/s, but one was ordered by PCP. She reports infrequent dysphagia to dry foods, stating she has to drink extra water to get them down and infrequent sensation that water does not want to go down which is not associated with solids. Infrequent, transient dysphonia. Denies SOB.     2/17/23: F/u after thyroid u/s, no nodules identified.     Imaging:     Narrative & Impression  EXAMINATION:  US THYROID     CLINICAL HISTORY:  , Encounter for immunization.     TECHNIQUE:  Multiple transverse and sagittal grayscale sonographic images were acquired through the thyroid gland.     COMPARISON:  November 2nd 2020     FINDINGS:  Examination reveals the patient to be status post right hemithyroidectomy.  Left lobe of the thyroid measures 2.2 x 1.4 x 1.7 cm.     There is heterogenicity of the thyroid parenchyma with no definite focal nodularity identified     Impression:     Status post right thyroidectomy.     Heterogenicity of the thyroid parenchyma with no focal nodules  identified        Electronically signed by: Abad Larson  Date:                                            01/10/2023  Time:                                           14:01       Assessment and plan: 62 y.o. female with h/o Graves with R thyroidectomy- path reportedly benign, h/o sternotomy and thymectomy in 2014 ([facility])- path benign. Left 1cm thyroid nodule s/p FNA with benign path 4/2019. TFT WNL on 125mcg synthroid. Reviewed thyroid u/s 1/10/23- no nodules identified. Pt declined speech referral, stating infrequent dysphagia is not particularly bothersome.   - Continue synthroid mgmt per PCP  - RTC prn    Trina Macedo NP

## 2023-03-08 DIAGNOSIS — M79.7 FIBROMYALGIA: ICD-10-CM

## 2023-03-08 DIAGNOSIS — M05.9 SEROPOSITIVE RHEUMATOID ARTHRITIS: ICD-10-CM

## 2023-03-08 RX ORDER — HYDROCODONE BITARTRATE AND ACETAMINOPHEN 10; 325 MG/1; MG/1
1 TABLET ORAL EVERY 8 HOURS PRN
Qty: 90 TABLET | Refills: 0 | Status: SHIPPED | OUTPATIENT
Start: 2023-03-08 | End: 2023-04-05 | Stop reason: SDUPTHER

## 2023-03-08 NOTE — TELEPHONE ENCOUNTER
Last visit with this patient was at [facility] in October 2022 but she will be seeing you here in this office on 03/22/2023.  Thank you Cora

## 2023-03-22 ENCOUNTER — OFFICE VISIT (OUTPATIENT)
Dept: RHEUMATOLOGY | Facility: CLINIC | Age: 63
End: 2023-03-22
Payer: MEDICARE

## 2023-03-22 VITALS
TEMPERATURE: 98 F | OXYGEN SATURATION: 98 % | WEIGHT: 236.38 LBS | BODY MASS INDEX: 43.5 KG/M2 | HEART RATE: 81 BPM | SYSTOLIC BLOOD PRESSURE: 133 MMHG | DIASTOLIC BLOOD PRESSURE: 82 MMHG | HEIGHT: 62 IN

## 2023-03-22 DIAGNOSIS — D49.89 THYMOMA: Primary | ICD-10-CM

## 2023-03-22 DIAGNOSIS — F51.01 PRIMARY INSOMNIA: ICD-10-CM

## 2023-03-22 DIAGNOSIS — K59.09 CHRONIC CONSTIPATION: ICD-10-CM

## 2023-03-22 DIAGNOSIS — M79.7 FIBROMYALGIA: ICD-10-CM

## 2023-03-22 DIAGNOSIS — G47.00 INSOMNIA, UNSPECIFIED TYPE: ICD-10-CM

## 2023-03-22 DIAGNOSIS — K21.9 GASTROESOPHAGEAL REFLUX DISEASE WITHOUT ESOPHAGITIS: ICD-10-CM

## 2023-03-22 DIAGNOSIS — M05.9 SEROPOSITIVE RHEUMATOID ARTHRITIS: ICD-10-CM

## 2023-03-22 DIAGNOSIS — Z87.898 H/O THYMOMA: ICD-10-CM

## 2023-03-22 PROCEDURE — 3075F PR MOST RECENT SYSTOLIC BLOOD PRESS GE 130-139MM HG: ICD-10-PCS | Mod: CPTII,S$GLB,, | Performed by: INTERNAL MEDICINE

## 2023-03-22 PROCEDURE — 3008F BODY MASS INDEX DOCD: CPT | Mod: CPTII,S$GLB,, | Performed by: INTERNAL MEDICINE

## 2023-03-22 PROCEDURE — 3079F DIAST BP 80-89 MM HG: CPT | Mod: CPTII,S$GLB,, | Performed by: INTERNAL MEDICINE

## 2023-03-22 PROCEDURE — 99999 PR PBB SHADOW E&M-EST. PATIENT-LVL IV: CPT | Mod: PBBFAC,,, | Performed by: INTERNAL MEDICINE

## 2023-03-22 PROCEDURE — 3075F SYST BP GE 130 - 139MM HG: CPT | Mod: CPTII,S$GLB,, | Performed by: INTERNAL MEDICINE

## 2023-03-22 PROCEDURE — 1159F PR MEDICATION LIST DOCUMENTED IN MEDICAL RECORD: ICD-10-PCS | Mod: CPTII,S$GLB,, | Performed by: INTERNAL MEDICINE

## 2023-03-22 PROCEDURE — 1159F MED LIST DOCD IN RCRD: CPT | Mod: CPTII,S$GLB,, | Performed by: INTERNAL MEDICINE

## 2023-03-22 PROCEDURE — 99999 PR PBB SHADOW E&M-EST. PATIENT-LVL IV: ICD-10-PCS | Mod: PBBFAC,,, | Performed by: INTERNAL MEDICINE

## 2023-03-22 PROCEDURE — 3079F PR MOST RECENT DIASTOLIC BLOOD PRESSURE 80-89 MM HG: ICD-10-PCS | Mod: CPTII,S$GLB,, | Performed by: INTERNAL MEDICINE

## 2023-03-22 PROCEDURE — 99214 PR OFFICE/OUTPT VISIT, EST, LEVL IV, 30-39 MIN: ICD-10-PCS | Mod: S$GLB,,, | Performed by: INTERNAL MEDICINE

## 2023-03-22 PROCEDURE — 3008F PR BODY MASS INDEX (BMI) DOCUMENTED: ICD-10-PCS | Mod: CPTII,S$GLB,, | Performed by: INTERNAL MEDICINE

## 2023-03-22 PROCEDURE — 99214 OFFICE O/P EST MOD 30 MIN: CPT | Mod: S$GLB,,, | Performed by: INTERNAL MEDICINE

## 2023-03-22 RX ORDER — TIZANIDINE 4 MG/1
8 TABLET ORAL NIGHTLY
Qty: 180 TABLET | Refills: 3 | Status: SHIPPED | OUTPATIENT
Start: 2023-03-22 | End: 2023-09-15 | Stop reason: SDUPTHER

## 2023-03-22 RX ORDER — GABAPENTIN 300 MG/1
300 CAPSULE ORAL 3 TIMES DAILY
Qty: 270 CAPSULE | Refills: 5 | Status: SHIPPED | OUTPATIENT
Start: 2023-03-22 | End: 2023-09-15 | Stop reason: SDUPTHER

## 2023-03-22 RX ORDER — FOLIC ACID 1 MG/1
1000 TABLET ORAL DAILY
Qty: 90 TABLET | Refills: 3 | Status: SHIPPED | OUTPATIENT
Start: 2023-03-22 | End: 2023-09-15 | Stop reason: SDUPTHER

## 2023-03-22 RX ORDER — ADALIMUMAB 40MG/0.4ML
40 KIT SUBCUTANEOUS WEEKLY
Qty: 4 PEN | Refills: 11 | Status: SHIPPED | OUTPATIENT
Start: 2023-03-22

## 2023-03-22 RX ORDER — ASCORBIC ACID 500 MG
500 TABLET ORAL DAILY
COMMUNITY

## 2023-03-22 RX ORDER — OMEPRAZOLE 40 MG/1
40 CAPSULE, DELAYED RELEASE ORAL EVERY MORNING
Qty: 90 CAPSULE | Refills: 3 | Status: SHIPPED | OUTPATIENT
Start: 2023-03-22 | End: 2023-09-15 | Stop reason: SDUPTHER

## 2023-03-22 RX ORDER — HYDROXYCHLOROQUINE SULFATE 200 MG/1
200 TABLET, FILM COATED ORAL 2 TIMES DAILY
Qty: 180 TABLET | Refills: 3 | Status: SHIPPED | OUTPATIENT
Start: 2023-03-22 | End: 2023-09-15 | Stop reason: SDUPTHER

## 2023-03-22 RX ORDER — METHOTREXATE 2.5 MG/1
TABLET ORAL
Qty: 90 TABLET | Refills: 3 | Status: SHIPPED | OUTPATIENT
Start: 2023-03-22 | End: 2023-09-15 | Stop reason: SDUPTHER

## 2023-03-22 RX ORDER — VITAMIN B COMPLEX
1 TABLET ORAL DAILY
Qty: 90 EACH | Refills: 3 | Status: SHIPPED | OUTPATIENT
Start: 2023-03-22

## 2023-03-22 NOTE — PROGRESS NOTES
"Subjective:       Patient ID: Mary Lou Perez is a 62 y.o. female.    Chief Complaint: Follow-up (Follow up. Patient complains of aches and generalized pain. Pain 4/10)    The patient is complaining of joint pain involving the MCP PIP wrist elbow shoulders hips knees and ankles bilaterally.  The pain is 9/10 in intensity dull in quality and continuous.  That is associated with a morning stiffness lasting for more than 60 minutes.  Is also having difficulty maintaining a good night of sleep.  This has been associated with myalgias.  Muscle aches are 4/10 in intensity dull in quality and continuous.  They are associated with fatigue.  No fever no chills no others.  Labs checked during this visit   Rheumatoid arthritis flaring up one week after the humira shots. She needs to do the humira once a week      Review of Systems   Constitutional:  Negative for appetite change, chills and fever.   HENT:  Negative for congestion, ear pain, mouth sores, nosebleeds and trouble swallowing.    Eyes:  Negative for photophobia and discharge.   Respiratory:  Negative for chest tightness and shortness of breath.    Cardiovascular:  Negative for chest pain.   Gastrointestinal:  Negative for abdominal pain and vomiting.   Endocrine: Negative.    Genitourinary:  Negative for hematuria.   Musculoskeletal:         As per HPI   Skin:  Negative for rash.   Neurological:  Negative for weakness.       Objective:   /82 (BP Location: Right arm, Patient Position: Sitting, BP Method: Large (Automatic))   Pulse 81   Temp 98.2 °F (36.8 °C) (Oral)   Ht 5' 2" (1.575 m)   Wt 107.2 kg (236 lb 6.4 oz)   SpO2 98%   BMI 43.24 kg/m²      Physical Exam   Constitutional: She is oriented to person, place, and time. She appears well-developed and well-nourished. No distress.   HENT:   Head: Normocephalic and atraumatic.   Right Ear: External ear normal.   Left Ear: External ear normal.   Eyes: Pupils are equal, round, and reactive to light. "   Cardiovascular: Normal rate, regular rhythm and normal heart sounds.   Pulmonary/Chest: Breath sounds normal.   Abdominal: Soft. There is no abdominal tenderness.   Musculoskeletal:      Right shoulder: Tenderness present.      Left shoulder: Tenderness present.      Right elbow: Tenderness present.      Left elbow: Tenderness present.      Right wrist: Tenderness present.      Left wrist: Tenderness present.      Cervical back: Neck supple.      Right hip: Tenderness present.      Left hip: Tenderness present.      Right knee: Tenderness present.      Left knee: Tenderness present.      Right ankle: Tenderness present.      Left ankle: Tenderness present.   Lymphadenopathy:     She has no cervical adenopathy.   Neurological: She is alert and oriented to person, place, and time. She displays normal reflexes. No cranial nerve deficit or sensory deficit. She exhibits normal muscle tone. Coordination normal.   Skin: No rash noted. No erythema.   Vitals reviewed.      Right Side Rheumatological Exam     The patient is tender to palpation of the shoulder, elbow, wrist, knee, 1st PIP, 1st MCP, 2nd PIP, 2nd MCP, 3rd PIP, 3rd MCP, 4th PIP, 4th MCP, 5th PIP, hip, ankle, 1st MTP, 2nd MTP, 3rd MTP, 4th MTP, 5th MTP, 1st toe IP, 2nd toe IP, 3rd toe IP, 4th toe IP and 5th toe IP    Left Side Rheumatological Exam     The patient is tender to palpation of the shoulder, elbow, wrist, knee, 1st PIP, 1st MCP, 2nd PIP, 2nd MCP, 3rd PIP, 3rd MCP, 4th PIP, 4th MCP, 5th PIP, 5th MCP, hip, ankle, 1st MTP, 2nd MTP, 3rd MTP, 4th MTP, 5th MTP, 1st toe IP, 2nd toe IP, 3rd toe IP, 4th toe IP and 5th toe IP.       Completed Fibromyalgia exam 18/18 tender points.  No data to display     Assessment:       1. Thymoma    2. Fibromyalgia    3. Insomnia, unspecified type    4. Seropositive rheumatoid arthritis    5. Gastroesophageal reflux disease without esophagitis    6. Chronic constipation    7. H/O thymoma    8. Primary insomnia             Medication List with Changes/Refills   New Medications    CYANOCOBALAMIN, VITAMIN B-12, (VITAMIN B-12) 2,500 MCG SUBL    Place 1 tablet under the tongue Daily.       Start Date: 3/22/2023 End Date: --   Current Medications    ALBUTEROL (PROVENTIL/VENTOLIN HFA) 90 MCG/ACTUATION INHALER    Inhale 2 puffs into the lungs every 6 (six) hours as needed.       Start Date: --        End Date: --    ASCORBIC ACID, VITAMIN C, (VITAMIN C) 500 MG TABLET    Take 500 mg by mouth once daily.       Start Date: --        End Date: --    BACILLUS COAGULANS 250 MILLION CELL CHEW    Take by mouth once daily. Takes 2 gummies daily       Start Date: --        End Date: --    FLUTICASONE-SALMETEROL DISKUS INHALER 500-50 MCG    Inhale 1 puff into the lungs 2 (two) times daily. Controller       Start Date: 11/21/2022End Date: --    HYDROCODONE-ACETAMINOPHEN (NORCO)  MG PER TABLET    Take 1 tablet by mouth every 8 (eight) hours as needed for Pain.       Start Date: 3/8/2023  End Date: 4/7/2023    LEVOTHYROXINE (SYNTHROID) 125 MCG TABLET    Take 125 mcg by mouth once daily.       Start Date: 1/31/2022 End Date: --    MECLIZINE (ANTIVERT) 25 MG TABLET    Take 1 tablet (25 mg total) by mouth 3 (three) times daily as needed.       Start Date: 12/7/2022 End Date: --    MONTELUKAST (SINGULAIR) 10 MG TABLET    Take 10 mg by mouth once daily.       Start Date: --        End Date: --    NITROGLYCERIN (NITROSTAT) 0.4 MG SL TABLET    DISSOLVE ONE TABLET UNDER THE TONGUE EVERY 5 MINUTES AS NEEDED FOR CHEST PAIN. DO NOT EXCEED A TOTAL OF 3 DOSES IN 15 MINUTES. IF PAIN PERSISTS, SEEK MEDICAL ATTENTION       Start Date: 1/31/2022 End Date: --    TRIAMTERENE-HYDROCHLOROTHIAZIDE 37.5-25 MG (MAXZIDE-25) 37.5-25 MG PER TABLET    Take 1 tablet by mouth once daily.       Start Date: 5/9/2022  End Date: --   Changed and/or Refilled Medications    Modified Medication Previous Medication    FOLIC ACID (FOLVITE) 1 MG TABLET folic acid (FOLVITE) 1 MG tablet        Take 1 tablet (1,000 mcg total) by mouth once daily.    Take 1 tablet (1,000 mcg total) by mouth once daily.       Start Date: 3/22/2023 End Date: --    Start Date: 10/19/2022End Date: 3/22/2023    GABAPENTIN (NEURONTIN) 300 MG CAPSULE gabapentin (NEURONTIN) 300 MG capsule       Take 1 capsule (300 mg total) by mouth 3 (three) times daily.    Take 1 capsule (300 mg total) by mouth 3 (three) times daily.       Start Date: 3/22/2023 End Date: --    Start Date: 10/19/2022End Date: 3/22/2023    HUMIRA,CF, PEN 40 MG/0.4 ML PNKT HUMIRA,CF, PEN 40 mg/0.4 mL PnKt       Inject 0.4 mLs (40 mg total) into the skin once a week.    Inject 0.4 mLs (40 mg total) into the skin every 14 (fourteen) days.       Start Date: 3/22/2023 End Date: --    Start Date: 6/15/2022 End Date: 3/22/2023    HYDROXYCHLOROQUINE (PLAQUENIL) 200 MG TABLET hydrOXYchloroQUINE (PLAQUENIL) 200 mg tablet       Take 1 tablet (200 mg total) by mouth 2 (two) times daily.    Take 1 tablet (200 mg total) by mouth 2 (two) times daily.       Start Date: 3/22/2023 End Date: --    Start Date: 10/19/2022End Date: 3/22/2023    METHOTREXATE 2.5 MG TAB methotrexate 2.5 MG Tab       TAKE 3 TABLETS BY MOUTH AFTER LUNCH AND TAKE 3 TABLETS AFTER SUPPER ON TUESDAY S    TAKE 3 TABLETS BY MOUTH AFTER LUNCH AND TAKE 3 TABLETS AFTER SUPPER ON TUESDAY S       Start Date: 3/22/2023 End Date: --    Start Date: 10/19/2022End Date: 3/22/2023    OMEPRAZOLE (PRILOSEC) 40 MG CAPSULE omeprazole (PRILOSEC) 40 MG capsule       Take 1 capsule (40 mg total) by mouth every morning.    Take 1 capsule (40 mg total) by mouth every morning.       Start Date: 3/22/2023 End Date: --    Start Date: 10/19/2022End Date: 3/22/2023    TIZANIDINE (ZANAFLEX) 4 MG TABLET tiZANidine (ZANAFLEX) 4 MG tablet       Take 2 tablets (8 mg total) by mouth nightly.    Take 1 tablet (4 mg total) by mouth nightly.       Start Date: 3/22/2023 End Date: 9/18/2023    Start Date: 10/19/2022End Date: 3/22/2023          Plan:         Problem List Items Addressed This Visit          Immunology/Multi System    Seropositive rheumatoid arthritis    Relevant Medications    gabapentin (NEURONTIN) 300 MG capsule    folic acid (FOLVITE) 1 MG tablet    HUMIRA,CF, PEN 40 mg/0.4 mL PnKt    hydrOXYchloroQUINE (PLAQUENIL) 200 mg tablet    methotrexate 2.5 MG Tab    omeprazole (PRILOSEC) 40 MG capsule    tiZANidine (ZANAFLEX) 4 MG tablet    Other Relevant Orders    CBC Auto Differential    Comprehensive Metabolic Panel    CRP, High Sensitivity    Rheumatoid Quantitative    Cyclic Citrullinated Peptide Antibody, IgG    Antinuclear Ab, HEp-2 Substrate    Hepatitis B Surface Antigen    Hepatitis C Antibody    TSH    T4, Free       Oncology    Thymoma - Primary    Relevant Orders    CBC Auto Differential    Comprehensive Metabolic Panel    CRP, High Sensitivity    Rheumatoid Quantitative    Cyclic Citrullinated Peptide Antibody, IgG    Antinuclear Ab, HEp-2 Substrate    Hepatitis B Surface Antigen    Hepatitis C Antibody    TSH    T4, Free       GI    GERD (gastroesophageal reflux disease)    Relevant Medications    gabapentin (NEURONTIN) 300 MG capsule    folic acid (FOLVITE) 1 MG tablet    hydrOXYchloroQUINE (PLAQUENIL) 200 mg tablet    methotrexate 2.5 MG Tab    omeprazole (PRILOSEC) 40 MG capsule    tiZANidine (ZANAFLEX) 4 MG tablet    Other Relevant Orders    CBC Auto Differential    Comprehensive Metabolic Panel    CRP, High Sensitivity    Rheumatoid Quantitative    Cyclic Citrullinated Peptide Antibody, IgG    Antinuclear Ab, HEp-2 Substrate    Hepatitis B Surface Antigen    Hepatitis C Antibody    TSH    T4, Free    Chronic constipation    Relevant Medications    gabapentin (NEURONTIN) 300 MG capsule    folic acid (FOLVITE) 1 MG tablet    hydrOXYchloroQUINE (PLAQUENIL) 200 mg tablet    methotrexate 2.5 MG Tab    omeprazole (PRILOSEC) 40 MG capsule    tiZANidine (ZANAFLEX) 4 MG tablet    Other Relevant Orders    CBC Auto Differential     Comprehensive Metabolic Panel    CRP, High Sensitivity    Rheumatoid Quantitative    Cyclic Citrullinated Peptide Antibody, IgG    Antinuclear Ab, HEp-2 Substrate    Hepatitis B Surface Antigen    Hepatitis C Antibody    TSH    T4, Free       Orthopedic    Fibromyalgia    Relevant Medications    gabapentin (NEURONTIN) 300 MG capsule    folic acid (FOLVITE) 1 MG tablet    HUMIRA,CF, PEN 40 mg/0.4 mL PnKt    hydrOXYchloroQUINE (PLAQUENIL) 200 mg tablet    methotrexate 2.5 MG Tab    omeprazole (PRILOSEC) 40 MG capsule    tiZANidine (ZANAFLEX) 4 MG tablet    Other Relevant Orders    CBC Auto Differential    Comprehensive Metabolic Panel    CRP, High Sensitivity    Rheumatoid Quantitative    Cyclic Citrullinated Peptide Antibody, IgG    Antinuclear Ab, HEp-2 Substrate    Hepatitis B Surface Antigen    Hepatitis C Antibody    TSH    T4, Free       Other    Insomnia    Relevant Medications    gabapentin (NEURONTIN) 300 MG capsule    folic acid (FOLVITE) 1 MG tablet    HUMIRA,CF, PEN 40 mg/0.4 mL PnKt    hydrOXYchloroQUINE (PLAQUENIL) 200 mg tablet    methotrexate 2.5 MG Tab    omeprazole (PRILOSEC) 40 MG capsule    tiZANidine (ZANAFLEX) 4 MG tablet    Other Relevant Orders    CBC Auto Differential    Comprehensive Metabolic Panel    CRP, High Sensitivity    Rheumatoid Quantitative    Cyclic Citrullinated Peptide Antibody, IgG    Antinuclear Ab, HEp-2 Substrate    Hepatitis B Surface Antigen    Hepatitis C Antibody    TSH    T4, Free    CBC Auto Differential    Comprehensive Metabolic Panel    CRP, High Sensitivity    Rheumatoid Quantitative    Cyclic Citrullinated Peptide Antibody, IgG    Antinuclear Ab, HEp-2 Substrate    Hepatitis B Surface Antigen    Hepatitis C Antibody    TSH    T4, Free     Other Visit Diagnoses       H/O thymoma        Relevant Medications    gabapentin (NEURONTIN) 300 MG capsule    folic acid (FOLVITE) 1 MG tablet    hydrOXYchloroQUINE (PLAQUENIL) 200 mg tablet    methotrexate 2.5 MG Tab     omeprazole (PRILOSEC) 40 MG capsule    tiZANidine (ZANAFLEX) 4 MG tablet    Other Relevant Orders    CBC Auto Differential    Comprehensive Metabolic Panel    CRP, High Sensitivity    Rheumatoid Quantitative    Cyclic Citrullinated Peptide Antibody, IgG    Antinuclear Ab, HEp-2 Substrate    Hepatitis B Surface Antigen    Hepatitis C Antibody    TSH    T4, Free

## 2023-03-30 ENCOUNTER — OFFICE VISIT (OUTPATIENT)
Dept: INTERNAL MEDICINE | Facility: CLINIC | Age: 63
End: 2023-03-30
Payer: MEDICARE

## 2023-03-30 VITALS
WEIGHT: 235 LBS | HEIGHT: 62 IN | RESPIRATION RATE: 16 BRPM | OXYGEN SATURATION: 98 % | DIASTOLIC BLOOD PRESSURE: 78 MMHG | HEART RATE: 81 BPM | TEMPERATURE: 99 F | SYSTOLIC BLOOD PRESSURE: 115 MMHG | BODY MASS INDEX: 43.24 KG/M2

## 2023-03-30 DIAGNOSIS — K21.9 GASTROESOPHAGEAL REFLUX DISEASE, UNSPECIFIED WHETHER ESOPHAGITIS PRESENT: Primary | ICD-10-CM

## 2023-03-30 PROCEDURE — 99214 OFFICE O/P EST MOD 30 MIN: CPT | Mod: PBBFAC

## 2023-03-30 NOTE — PROGRESS NOTES
[facility] Medicine Resident [facility]    Subjective:      61-year-old with past medical history of rheumatoid arthritis, fibromyalgia, thymoma status post removal, Graves' disease status post thyroidectomy on Synthroid, history of GERD, hypertension, pulmonary nodules today for follow-up. Following up with Dr. Lynch. No complaints today.     Review of Systems:  10 point ROS negative except for HPI    Objective:   Vital Signs:  Vitals:    03/30/23 1013   BP: 115/78   Pulse: 81   Resp: 16   Temp: 98.6 °F (37 °C)      General: well-developed well-nourished in no acute distress  Neck: full range of motion, no thyromegaly or lymphadenopathy  Respiratory: clear to auscultation bilaterally  Cardiovascular: regular rate and rhythm without murmurs, gallops or rubs  Gastrointestinal: soft, non-tender, non-distended with normal bowel sounds, without masses to palpation  Musculoskeletal: MCP PIP wrist elbows shoulders hips knees and ankles tender bilaterally.  Integumentary: no rashes or skin lesions present  Breast: No lumps or nodules noted or tender on palpation  Neurologic: cranial nerves intact, no signs of peripheral neurological deficit, motor/sensory function intact      Body mass index is 42.98 kg/m².         Laboratory:  Lab Results   Component Value Date    WBC 7.1 03/22/2023    HGB 13.2 03/22/2023    HCT 39.4 03/22/2023     03/22/2023    MCV 88.9 03/22/2023    RDW 14.2 03/22/2023    Lab Results   Component Value Date     03/22/2023    K 4.4 03/22/2023    CO2 30 03/22/2023    BUN 12.1 03/22/2023    CREATININE 0.90 03/22/2023    CALCIUM 10.0 03/22/2023      Lab Results   Component Value Date    HGBA1C 4.8 04/07/2022    EAG 91.1 04/07/2022    CREATININE 0.90 03/22/2023    Lab Results   Component Value Date    TSH 3.051 03/22/2023    ESYTQL0QPVS 1.06 03/22/2023                Current Medications:  Current Outpatient Medications   Medication Instructions    albuterol (PROVENTIL/VENTOLIN HFA) 90  mcg/actuation inhaler 2 puffs, Inhalation, Every 6 hours PRN    ascorbic acid (vitamin C) (VITAMIN C) 500 mg, Oral, Daily    Bacillus coagulans 250 million cell Chew Oral, Daily, Takes 2 gummies daily    cyanocobalamin, vitamin B-12, (VITAMIN B-12) 2,500 mcg Subl 1 tablet, Sublingual, Daily    fluticasone-salmeterol diskus inhaler 500-50 mcg 1 puff, Inhalation, 2 times daily, Controller    folic acid (FOLVITE) 1,000 mcg, Oral, Daily    gabapentin (NEURONTIN) 300 mg, Oral, 3 times daily    HUMIRA(CF) PEN 40 mg, Subcutaneous, Weekly    HYDROcodone-acetaminophen (NORCO)  mg per tablet 1 tablet, Oral, Every 8 hours PRN    hydrOXYchloroQUINE (PLAQUENIL) 200 mg, Oral, 2 times daily    levothyroxine (SYNTHROID) 125 mcg, Oral, Daily    meclizine (ANTIVERT) 25 mg, Oral, 3 times daily PRN    methotrexate 2.5 MG Tab TAKE 3 TABLETS BY MOUTH AFTER LUNCH AND TAKE 3 TABLETS AFTER SUPPER ON TUESDAY S    montelukast (SINGULAIR) 10 mg, Oral, Daily    nitroGLYCERIN (NITROSTAT) 0.4 MG SL tablet DISSOLVE ONE TABLET UNDER THE TONGUE EVERY 5 MINUTES AS NEEDED FOR CHEST PAIN. DO NOT EXCEED A TOTAL OF 3 DOSES IN 15 MINUTES. IF PAIN PERSISTS, SEEK MEDICAL ATTENTION    omeprazole (PRILOSEC) 40 mg, Oral, Every morning    tiZANidine (ZANAFLEX) 8 mg, Oral, Nightly    triamterene-hydrochlorothiazide 37.5-25 mg (MAXZIDE-25) 37.5-25 mg per tablet 1 tablet, Oral, Daily        Assessment and Plan:      Seropositive rheumatoid arthritis  FRANK positive, rheumatoid factor positive, CCP positive  Continue Plaquenil 200 mg twice daily  Completed prednisone taper  Continue methotrexate 15 mg and folic acid daily   Continue tizanidine, gabapentin and norco  for pain   Continue following Rheum with Dr. Lynch( last seen 10/19/22)  Continue humira; recommended pt to ask Dr. Lynch  whether she needs to be on it given his notes does not mention it    Fibromyalgia  Denies any muscle pain or body pain at this time  Continue tizanidine, gabapentin and  norco  for pain     Thymoma status post thymectomy 2014  Denies chest pain, hoarse voice, swelling in the face, neck, upper body arms at this time  Was Following ENT , LAST SEEN AT 9/3/20, continue Flonase, antihistamine per ENT  Referral to ENT sent     Graves' disease status post thyroidectomy  On Synthroid daily  Denies heart palpitations  Endorses weight gain, constipation and dry skin  ENT referred for a thyroid US with results below :  Ultrasound thyroid on 11/02/2020 shows right thyroidectomy.  1 cm left-sided thyroid nodule.  Hypervascular left level thyroid consistent with thyroiditis.  TFTS wnl 4/22  Repeat TFTS and Thyroid US ordered  Referral to ENT sent given she lost to follow up       CAD   [facility] in [facility];No interventions done per pt  Was following cards in the past   Last seen cardiologist on 09/30/2020  Lexiscan stress test on 06/24/2020 - for ischemia  Echo done on 08/07/2020 shows left undergo ejection fraction 55 to 60%  Denies any chest pain or SOB at this time    GERD  Patient used to follow up with GI before but lost in touch when covid hits  H. pylori gastritis confirmed by biopsy January 2020, completed antibiotics   fecal occult immunoassay for colon cancer screening was neg in 9/2021  switched from Nexium to Rabeprazole 40 daily at this time  Refused EGD appt scheduled on 11/22 with GI   Interested in doing EGD; called pt to call GI and let them about change of decision      Hypertension- controlled  Continue HCTZ-traimterene    Vertigo   Started on meclizine PRN       Flu shot uptd  mammogram UPTD 9/22  papsmear appt in 4/23  Refuses colonoscopy   Last DXA in 2020 showed osteopenia ;  repeat DXA 1/23- osteopenia; continue vitamin d and ca tabs   Does not smoke; but stable pum nodules for years(last CT scan in 2017)      Health Maintenance   Topic Date Due    Mammogram  09/06/2023    Lipid Panel  04/07/2027    TETANUS VACCINE  07/18/2030    Hepatitis C Screening  Completed                     Laura Chin, DO

## 2023-03-30 NOTE — PROGRESS NOTES
I have reviewed and concur with the resident's history, physical, assessment, and plan.  I have discussed with him all issues related to the diagnosis, workup and treatment plan. Care provided as reasonable and necessary.No ischemiaHx of CAD    Heriberto Mullins MD  [facility]

## 2023-03-31 ENCOUNTER — TELEPHONE (OUTPATIENT)
Dept: INTERNAL MEDICINE | Facility: CLINIC | Age: 63
End: 2023-03-31
Payer: MEDICARE

## 2023-03-31 NOTE — TELEPHONE ENCOUNTER
----- Message from Nanda Lopez sent at 3/31/2023  8:45 AM CDT -----  Regarding: Dr. Chin-RTC Appt.  Good morning, patient was seen in clinic on 03/30 when should she RTC? Thank you

## 2023-04-04 ENCOUNTER — PATIENT MESSAGE (OUTPATIENT)
Dept: RESEARCH | Facility: HOSPITAL | Age: 63
End: 2023-04-04
Payer: MEDICARE

## 2023-04-05 DIAGNOSIS — M79.7 FIBROMYALGIA: ICD-10-CM

## 2023-04-05 DIAGNOSIS — M05.9 SEROPOSITIVE RHEUMATOID ARTHRITIS: ICD-10-CM

## 2023-04-05 RX ORDER — HYDROCODONE BITARTRATE AND ACETAMINOPHEN 10; 325 MG/1; MG/1
1 TABLET ORAL EVERY 8 HOURS PRN
Qty: 90 TABLET | Refills: 0 | Status: SHIPPED | OUTPATIENT
Start: 2023-04-05 | End: 2023-05-05 | Stop reason: SDUPTHER

## 2023-04-05 NOTE — TELEPHONE ENCOUNTER
----- Message from Lucy Samuel sent at 4/5/2023  3:11 PM CDT -----  Regarding: Medication refill  Patient called requesting refill for [place]. [facility].

## 2023-04-06 ENCOUNTER — DOCUMENTATION ONLY (OUTPATIENT)
Dept: ADMINISTRATIVE | Facility: HOSPITAL | Age: 63
End: 2023-04-06
Payer: MEDICARE

## 2023-04-20 ENCOUNTER — LAB VISIT (OUTPATIENT)
Dept: LAB | Facility: HOSPITAL | Age: 63
End: 2023-04-20
Attending: NURSE PRACTITIONER
Payer: MEDICARE

## 2023-04-20 ENCOUNTER — OFFICE VISIT (OUTPATIENT)
Dept: GYNECOLOGY | Facility: CLINIC | Age: 63
End: 2023-04-20
Payer: MEDICARE

## 2023-04-20 VITALS
OXYGEN SATURATION: 100 % | HEIGHT: 62 IN | SYSTOLIC BLOOD PRESSURE: 124 MMHG | TEMPERATURE: 98 F | RESPIRATION RATE: 18 BRPM | HEART RATE: 78 BPM | WEIGHT: 234.19 LBS | DIASTOLIC BLOOD PRESSURE: 79 MMHG | BODY MASS INDEX: 43.1 KG/M2

## 2023-04-20 DIAGNOSIS — Z11.3 ROUTINE SCREENING FOR STI (SEXUALLY TRANSMITTED INFECTION): ICD-10-CM

## 2023-04-20 DIAGNOSIS — N90.89 VULVAR LESION: ICD-10-CM

## 2023-04-20 DIAGNOSIS — Z01.419 WOMEN'S ANNUAL ROUTINE GYNECOLOGICAL EXAMINATION: Primary | ICD-10-CM

## 2023-04-20 DIAGNOSIS — Z80.3 FAMILY HX-BREAST MALIGNANCY: ICD-10-CM

## 2023-04-20 DIAGNOSIS — Z12.31 SCREENING MAMMOGRAM FOR BREAST CANCER: ICD-10-CM

## 2023-04-20 LAB
CLUE CELLS VAG QL WET PREP: ABNORMAL
HBV SURFACE AG SERPL QL IA: NONREACTIVE
HCV AB SERPL QL IA: NONREACTIVE
HIV 1+2 AB+HIV1 P24 AG SERPL QL IA: NONREACTIVE
T PALLIDUM AB SER QL: NONREACTIVE
T VAGINALIS VAG QL WET PREP: ABNORMAL
WBC #/AREA VAG WET PREP: ABNORMAL
YEAST SPEC QL WET PREP: ABNORMAL

## 2023-04-20 PROCEDURE — 1159F PR MEDICATION LIST DOCUMENTED IN MEDICAL RECORD: ICD-10-PCS | Mod: CPTII,,, | Performed by: NURSE PRACTITIONER

## 2023-04-20 PROCEDURE — 86780 TREPONEMA PALLIDUM: CPT

## 2023-04-20 PROCEDURE — 86803 HEPATITIS C AB TEST: CPT

## 2023-04-20 PROCEDURE — 87389 HIV-1 AG W/HIV-1&-2 AB AG IA: CPT

## 2023-04-20 PROCEDURE — 99396 PR PREVENTIVE VISIT,EST,40-64: ICD-10-PCS | Mod: S$PBB,,, | Performed by: NURSE PRACTITIONER

## 2023-04-20 PROCEDURE — 87624 HPV HI-RISK TYP POOLED RSLT: CPT

## 2023-04-20 PROCEDURE — 87210 SMEAR WET MOUNT SALINE/INK: CPT | Performed by: NURSE PRACTITIONER

## 2023-04-20 PROCEDURE — 99215 OFFICE O/P EST HI 40 MIN: CPT | Mod: PBBFAC | Performed by: NURSE PRACTITIONER

## 2023-04-20 PROCEDURE — 87255 GENET VIRUS ISOLATE HSV: CPT | Mod: 90 | Performed by: NURSE PRACTITIONER

## 2023-04-20 PROCEDURE — 36415 COLL VENOUS BLD VENIPUNCTURE: CPT

## 2023-04-20 PROCEDURE — 87491 CHLMYD TRACH DNA AMP PROBE: CPT

## 2023-04-20 PROCEDURE — 3008F PR BODY MASS INDEX (BMI) DOCUMENTED: ICD-10-PCS | Mod: CPTII,,, | Performed by: NURSE PRACTITIONER

## 2023-04-20 PROCEDURE — 87340 HEPATITIS B SURFACE AG IA: CPT

## 2023-04-20 PROCEDURE — 3074F PR MOST RECENT SYSTOLIC BLOOD PRESSURE < 130 MM HG: ICD-10-PCS | Mod: CPTII,,, | Performed by: NURSE PRACTITIONER

## 2023-04-20 PROCEDURE — 3008F BODY MASS INDEX DOCD: CPT | Mod: CPTII,,, | Performed by: NURSE PRACTITIONER

## 2023-04-20 PROCEDURE — 87591 N.GONORRHOEAE DNA AMP PROB: CPT

## 2023-04-20 PROCEDURE — 3078F DIAST BP <80 MM HG: CPT | Mod: CPTII,,, | Performed by: NURSE PRACTITIONER

## 2023-04-20 PROCEDURE — 1160F RVW MEDS BY RX/DR IN RCRD: CPT | Mod: CPTII,,, | Performed by: NURSE PRACTITIONER

## 2023-04-20 PROCEDURE — 1159F MED LIST DOCD IN RCRD: CPT | Mod: CPTII,,, | Performed by: NURSE PRACTITIONER

## 2023-04-20 PROCEDURE — 1160F PR REVIEW ALL MEDS BY PRESCRIBER/CLIN PHARMACIST DOCUMENTED: ICD-10-PCS | Mod: CPTII,,, | Performed by: NURSE PRACTITIONER

## 2023-04-20 PROCEDURE — 99396 PREV VISIT EST AGE 40-64: CPT | Mod: S$PBB,,, | Performed by: NURSE PRACTITIONER

## 2023-04-20 PROCEDURE — 86695 HERPES SIMPLEX TYPE 1 TEST: CPT | Mod: 90

## 2023-04-20 PROCEDURE — 88174 CYTOPATH C/V AUTO IN FLUID: CPT | Performed by: NURSE PRACTITIONER

## 2023-04-20 PROCEDURE — 3074F SYST BP LT 130 MM HG: CPT | Mod: CPTII,,, | Performed by: NURSE PRACTITIONER

## 2023-04-20 PROCEDURE — 3078F PR MOST RECENT DIASTOLIC BLOOD PRESSURE < 80 MM HG: ICD-10-PCS | Mod: CPTII,,, | Performed by: NURSE PRACTITIONER

## 2023-04-20 RX ORDER — ACYCLOVIR 400 MG/1
400 TABLET ORAL 3 TIMES DAILY
Qty: 21 TABLET | Refills: 0 | Status: SHIPPED | OUTPATIENT
Start: 2023-04-20 | End: 2023-04-25

## 2023-04-20 NOTE — PROGRESS NOTES
"Patient ID: Mary Lou Perez is a 62 y.o. female.    Chief Complaint: Annual Exam      Review of patient's allergies indicates:  No Known Allergies    HPI:  The patient is  here for annual gyn exam. Denies hx of abnormal pap. Pt is postmenopausal. She had an episode of PMB in 2019 and completed hysteroscopy D&C/polypectomy. She denies any further bleeding. She c/o vulvar "cysts". States they are painful and intermittent and onset was approx 2 years ago. Denies known hx of herpes. Pt is . Denies lesions on spouse's penis. She is currently on humira and plaquenil for hx of RA. Pt is also requesting referral for genetic counseling. States mother diagnosed with breast cancer in her 40s and maternal aunt also had breast cancer.     Review of Systems:   Negative except for findings in HPI     Objective:   /79   Pulse 78   Temp 98.3 °F (36.8 °C) (Oral)   Resp 18   Ht 5' 2" (1.575 m)   Wt 106.2 kg (234 lb 3.2 oz)   SpO2 100%   BMI 42.84 kg/m²    Physical Exam:  GENERAL: Pt is aware and alert and  in no acute distress.  BREASTS: Bilateral-No masses, nipple discharge, skin changes, or tenderness.  ABDOMEN: Soft, non tender.  VULVA:  multiple small ulcers with erythematous base noted to bilateral lower vulva; +tenderness ;oozing blood when swabbed  URETHRA: No lesions  BLADDER: No tenderness.  VAGINA: Mucosa normal,white discharge; no lesions.  CERVIX:  no CMT, NO discharge; NO lesions  BIMANUAL EXAM: reveals a 8-week-sized uterus. The uterus is mobile, nontender, no palpable masses. Wilfredo adnexa reveal no evidence of masses; no fullness   SKIN: Warm and Dry  PSYCHIATRIC: Patient is awake and alert. Mood and affect are normal.    Assessment:   Women's annual routine gynecological examination  -     Ambulatory referral/consult to Obstetrics / Gynecology  -     Liquid-Based Pap Smear, Screening Screening    Screening mammogram for breast cancer  -     Mammo Digital Screening Bilat w/ Trip; Future; " Expected date: 10/20/2023    Routine screening for STI (sexually transmitted infection)  -     Wet Prep, Genital  -     Hepatitis C Antibody; Future; Expected date: 04/20/2023  -     Hepatitis B Surface Antigen; Future; Expected date: 04/20/2023  -     SYPHILIS ANTIBODY (WITH REFLEX RPR); Future; Expected date: 04/20/2023  -     HIV 1/2 Ag/Ab (4th Gen); Future; Expected date: 04/20/2023  -     Herpes simplex virus culture [facility]; Vagina; Future; Expected date: 04/20/2023  -     HSV 1 & 2, IgG; Future; Expected date: 04/20/2023    Vulvar lesion  -     SYPHILIS ANTIBODY (WITH REFLEX RPR); Future; Expected date: 04/20/2023  -     Herpes simplex virus culture [facility]; Vagina; Future; Expected date: 04/20/2023  -     HSV 1 & 2, IgG; Future; Expected date: 04/20/2023    Family hx-breast malignancy  -     Ambulatory referral/consult to Hematology / Oncology; Future; Expected date: 05/20/2023  -     Mammo Digital Screening Bilat w/ Trip; Future; Expected date: 10/20/2023    Other orders  -     acyclovir (ZOVIRAX) 400 MG tablet; Take 1 tablet (400 mg total) by mouth 3 (three) times daily. for 7 days  Dispense: 21 tablet; Refill: 0            1. Women's annual routine gynecological examination    2. Screening mammogram for breast cancer    3. Routine screening for STI (sexually transmitted infection)    4. Vulvar lesion    5. Family hx-breast malignancy             -pap/hpv  -suspect herpes-begin treatment with acyclovir; swab sent; blood work ordered  -Contact clinic with any vaginal bleeding as this would be abnormal in postmenopausal pt.   -refer for genetic counseling  Plan:       Follow up in about 1 year (around 4/20/2024).

## 2023-04-21 LAB
HSV1 IGG SERPL QL IA: POSITIVE
HSV2 IGG SERPL QL IA: POSITIVE
PSYCHE PATHOLOGY RESULT: NORMAL

## 2023-04-22 LAB — AR CULTURE, HSV FINAL: NORMAL

## 2023-04-25 ENCOUNTER — TELEPHONE (OUTPATIENT)
Dept: GYNECOLOGY | Facility: CLINIC | Age: 63
End: 2023-04-25
Payer: MEDICARE

## 2023-04-25 RX ORDER — ACYCLOVIR 800 MG/1
800 TABLET ORAL 3 TIMES DAILY
Qty: 6 TABLET | Refills: 3 | Status: SHIPPED | OUTPATIENT
Start: 2023-04-25 | End: 2023-08-23 | Stop reason: SDUPTHER

## 2023-04-25 NOTE — TELEPHONE ENCOUNTER
Please inform pt that her swab for herpes was positive. She may experience more outbreaks in the future. I sent refills of acyclovir to her pharmacy to take ONLY  with an outbreak. She can take the medication within 24-48 hours of an outbreak. If she has more than 3-4/year, she should notify our clinic.

## 2023-05-05 DIAGNOSIS — M05.9 SEROPOSITIVE RHEUMATOID ARTHRITIS: ICD-10-CM

## 2023-05-05 DIAGNOSIS — M79.7 FIBROMYALGIA: ICD-10-CM

## 2023-05-05 RX ORDER — HYDROCODONE BITARTRATE AND ACETAMINOPHEN 10; 325 MG/1; MG/1
1 TABLET ORAL EVERY 8 HOURS PRN
Qty: 90 TABLET | Refills: 0 | Status: SHIPPED | OUTPATIENT
Start: 2023-05-05 | End: 2023-06-05 | Stop reason: SDUPTHER

## 2023-05-05 NOTE — TELEPHONE ENCOUNTER
----- Message from Lucy Samuel sent at 5/5/2023  9:47 AM CDT -----  Regarding: Medication refill  Patient called requesting refill for [place]. [facility] on [facility].

## 2023-05-18 ENCOUNTER — HOSPITAL ENCOUNTER (EMERGENCY)
Facility: HOSPITAL | Age: 63
Discharge: HOME | End: 2023-05-18
Attending: EMERGENCY MEDICINE
Payer: MEDICARE

## 2023-05-18 VITALS
OXYGEN SATURATION: 96 % | HEART RATE: 79 BPM | HEIGHT: 62 IN | SYSTOLIC BLOOD PRESSURE: 148 MMHG | WEIGHT: 235.44 LBS | BODY MASS INDEX: 43.32 KG/M2 | TEMPERATURE: 99 F | DIASTOLIC BLOOD PRESSURE: 86 MMHG | RESPIRATION RATE: 18 BRPM

## 2023-05-18 DIAGNOSIS — M79.605 LEFT LEG PAIN: ICD-10-CM

## 2023-05-18 DIAGNOSIS — M54.42 ACUTE BILATERAL LOW BACK PAIN WITH LEFT-SIDED SCIATICA: Primary | ICD-10-CM

## 2023-05-18 LAB — D DIMER PPP IA.FEU-MCNC: 3.04 UG/ML FEU (ref 0–0.5)

## 2023-05-18 PROCEDURE — 99285 EMERGENCY DEPT VISIT HI MDM: CPT | Mod: 25

## 2023-05-18 PROCEDURE — 63600175 PHARM REV CODE 636 W HCPCS: Performed by: PHYSICIAN ASSISTANT

## 2023-05-18 PROCEDURE — 96372 THER/PROPH/DIAG INJ SC/IM: CPT | Performed by: PHYSICIAN ASSISTANT

## 2023-05-18 PROCEDURE — 85379 FIBRIN DEGRADATION QUANT: CPT | Performed by: PHYSICIAN ASSISTANT

## 2023-05-18 RX ORDER — MELOXICAM 15 MG/1
15 TABLET ORAL DAILY
Qty: 14 TABLET | Refills: 0 | Status: SHIPPED | OUTPATIENT
Start: 2023-05-18 | End: 2023-06-01

## 2023-05-18 RX ORDER — KETOROLAC TROMETHAMINE 30 MG/ML
30 INJECTION, SOLUTION INTRAMUSCULAR; INTRAVENOUS
Status: COMPLETED | OUTPATIENT
Start: 2023-05-18 | End: 2023-05-18

## 2023-05-18 RX ADMIN — KETOROLAC TROMETHAMINE 30 MG: 30 INJECTION, SOLUTION INTRAMUSCULAR; INTRAVENOUS at 09:05

## 2023-05-18 NOTE — Clinical Note
"Mary Lou Hayward"Chris was seen and treated in our emergency department on 5/18/2023.  She may return to work on 05/20/2023.       If you have any questions or concerns, please don't hesitate to call.      Sharee RN RN    "

## 2023-05-18 NOTE — ED PROVIDER NOTES
Encounter Date: 5/18/2023       History     Chief Complaint   Patient presents with    Leg Pain     Left leg pain that started in the calf, now involves the whole left leg. Pain x 6 days.     63 YO AAF in ER with complaints of left leg pain X over 1 week. She states the pain started in her left calf and has now traveled up the leg into her thigh and buttocks with lower back pain and tingling in her toes on the left foot. Denies injury/trauma. Denies leg swelling, fever, chills, chest pain, SOB, abdominal pain, N/V/D, HA or dizziness. No other complaints.     The history is provided by the patient.   Review of patient's allergies indicates:  No Known Allergies  Past Medical History:   Diagnosis Date    Asthma     Fibromyalgia     Graves' disease     Hypertension     Hypothyroid     Insomnia     Mediastinal mass     Postoperative hypothyroidism     Rheumatoid arthritis     Seropositive rheumatoid arthritis     Thymoma      Past Surgical History:   Procedure Laterality Date    BILATERAL TUBAL LIGATION      BREAST BIOPSY      CARDIAC CATHETERIZATION      DILATION AND CURETTAGE OF UTERUS      ESOPHAGOGASTRODUODENOSCOPY      MEDIASTINAL MASS EXCISION      MOUTH SURGERY      had bottom teeth pulled    MULTIPLE TOOTH EXTRACTIONS      All teeth now removed    THYROIDECTOMY, PARTIAL       Family History   Problem Relation Age of Onset    Esophageal cancer Father     Hypertension Mother     Diabetes Mother     Breast cancer Mother     Diabetes Daughter     Cervical cancer Daughter      Social History     Tobacco Use    Smoking status: Never    Smokeless tobacco: Never   Substance Use Topics    Alcohol use: Not Currently     Comment: ocassionally    Drug use: Never     Review of Systems   Constitutional:  Negative for chills and fever.   HENT:  Negative for congestion and sore throat.    Respiratory:  Negative for shortness of breath.    Cardiovascular:  Negative for chest pain and leg swelling.   Gastrointestinal:  Negative  for abdominal pain, diarrhea, nausea and vomiting.   Genitourinary:  Negative for dysuria.   Musculoskeletal:  Positive for arthralgias, back pain and myalgias.   Skin:  Negative for rash.   Neurological:  Negative for dizziness, weakness, light-headedness and headaches.   Hematological:  Does not bruise/bleed easily.   All other systems reviewed and are negative.    Physical Exam     Initial Vitals [05/18/23 0923]   BP Pulse Resp Temp SpO2   (!) 155/96 85 18 98.2 °F (36.8 °C) 97 %      MAP       --         Physical Exam    Nursing note and vitals reviewed.  Constitutional: She appears well-developed and well-nourished. She is not diaphoretic. No distress.   HENT:   Head: Normocephalic and atraumatic.   Nose: Nose normal.   Eyes: Conjunctivae are normal.   Neck:   Normal range of motion.  Cardiovascular:  Normal rate, regular rhythm and intact distal pulses.           Pulmonary/Chest: Breath sounds normal.   Musculoskeletal:         General: No edema.      Cervical back: Normal range of motion.      Lumbar back: Tenderness present. No swelling, deformity, spasms or bony tenderness. Decreased range of motion. No scoliosis.      Left lower leg: Tenderness present. No swelling. No edema.        Legs:       Comments: Ttp soft tissues of left calf and left hamstring     Neurological: She is alert and oriented to person, place, and time. She has normal strength.   Skin: Skin is warm and dry.   Psychiatric: She has a normal mood and affect.       ED Course   Procedures  Labs Reviewed   D DIMER, QUANTITATIVE - Abnormal; Notable for the following components:       Result Value    D-Dimer 3.04 (*)     All other components within normal limits   EXTRA TUBES    Narrative:     The following orders were created for panel order EXTRA TUBES.  Procedure                               Abnormality         Status                     ---------                               -----------         ------                     Light Green Top  Hold[[id number]]                             In process                 Lavender Top Hold[[id number]]                                In process                 Gold Top Hold[[id number]]                                    In process                 Pink Top Hold[[id number]]                                    In process                   Please view results for these tests on the individual orders.   LIGHT GREEN TOP HOLD   LAVENDER TOP HOLD   GOLD TOP HOLD   PINK TOP HOLD          Imaging Results              X-Ray Lumbar Spine Ap And Lateral (Final result)  Result time 05/18/23 11:41:06      Final result by Katie Callahan MD (05/18/23 11:41:06)                   Impression:      1. No acute abnormality identified.  2. Multilevel degenerative changes of the lumbar spine.      Electronically signed by: Katie Callahan  Date:    05/18/2023  Time:    11:41               Narrative:    EXAMINATION:  XR LUMBAR SPINE AP AND LATERAL    CLINICAL HISTORY:  pain;    COMPARISON:  None.    FINDINGS:  There are 5 non-rib-bearing lumbar type vertebral bodies.  Alignment is preserved.  The vertebral body heights are maintained.  There is mild disc height loss at L4-5 with small marginal osteophytes.  Moderate facet arthropathy is present in the lower lumbar spine.  The soft tissues are unremarkable.                                       Medications   ketorolac injection 30 mg (30 mg Intramuscular Given 5/18/23 0954)                 ED Course as of 05/18/23 1241   Thu May 18, 2023   1054 D-Dimer(!): 3.04  Due to elevated D-dimer will order US of LLE to rule out DVT, pt aware and in agreement [TT]   1238 US negative for DVT, Lumbar spine xray with degenerative changes, will treat for DDD and sciatica to LLE, VSS, NAD, pt is non-toxic or ill appearing, treatment plan and discharge instructions including follow up discussed, pt verbalized understanding, all questions answered, pt is stable and ready for discharge  [TT]      ED Course  User Index  [TT] GISELE Cook                 Clinical Impression:   Final diagnoses:  [M79.605] Left leg pain  [M54.42] Acute bilateral low back pain with left-sided sciatica (Primary)        ED Disposition Condition    Discharge Stable          ED Prescriptions       Medication Sig Dispense Start Date End Date Auth. Provider    meloxicam (MOBIC) 15 MG tablet Take 1 tablet (15 mg total) by mouth once daily. for 14 days 14 tablet 5/18/2023 6/1/2023 GISELE Cook          Follow-up Information       Follow up With Specialties Details Why Contact Info    Laura Chin DO Internal Medicine Schedule an appointment as soon as possible for a visit in 3 days  [address] [address]  [phone]      [facility] Emergency Medicine In 3 days As needed, If symptoms worsen [address] [facility] [id number] [phone]             GISELE Cook  05/18/23 1241

## 2023-06-05 ENCOUNTER — TELEPHONE (OUTPATIENT)
Dept: INTERNAL MEDICINE | Facility: CLINIC | Age: 63
End: 2023-06-05
Payer: MEDICARE

## 2023-06-05 DIAGNOSIS — Z12.39 ENCOUNTER FOR SCREENING FOR MALIGNANT NEOPLASM OF BREAST, UNSPECIFIED SCREENING MODALITY: Primary | ICD-10-CM

## 2023-06-05 DIAGNOSIS — M05.9 SEROPOSITIVE RHEUMATOID ARTHRITIS: ICD-10-CM

## 2023-06-05 DIAGNOSIS — M79.7 FIBROMYALGIA: ICD-10-CM

## 2023-06-05 RX ORDER — HYDROCODONE BITARTRATE AND ACETAMINOPHEN 10; 325 MG/1; MG/1
1 TABLET ORAL EVERY 8 HOURS PRN
Qty: 90 TABLET | Refills: 0 | Status: SHIPPED | OUTPATIENT
Start: 2023-06-05 | End: 2023-07-05 | Stop reason: SDUPTHER

## 2023-06-05 NOTE — TELEPHONE ENCOUNTER
----- Message from Lucy Samuel sent at 6/5/2023  9:53 AM CDT -----  Regarding: Medication refill  Patient called requesting refill for [facility]. [facility] on [address] in [place].

## 2023-06-21 ENCOUNTER — PATIENT MESSAGE (OUTPATIENT)
Dept: ADMINISTRATIVE | Facility: HOSPITAL | Age: 63
End: 2023-06-21
Payer: MEDICARE

## 2023-07-05 DIAGNOSIS — M79.7 FIBROMYALGIA: ICD-10-CM

## 2023-07-05 DIAGNOSIS — M05.9 SEROPOSITIVE RHEUMATOID ARTHRITIS: ICD-10-CM

## 2023-07-05 RX ORDER — HYDROCODONE BITARTRATE AND ACETAMINOPHEN 10; 325 MG/1; MG/1
1 TABLET ORAL EVERY 8 HOURS PRN
Qty: 90 TABLET | Refills: 0 | Status: SHIPPED | OUTPATIENT
Start: 2023-07-05 | End: 2023-08-04 | Stop reason: SDUPTHER

## 2023-07-05 NOTE — TELEPHONE ENCOUNTER
----- Message from Lucy Samuel sent at 7/5/2023 11:08 AM CDT -----  Regarding: Medication refill  Patient called requesting refill for [facility] on [facility].

## 2023-07-18 ENCOUNTER — OFFICE VISIT (OUTPATIENT)
Dept: INTERNAL MEDICINE | Facility: CLINIC | Age: 63
End: 2023-07-18
Payer: MEDICARE

## 2023-07-18 VITALS
HEIGHT: 62 IN | WEIGHT: 234 LBS | HEART RATE: 73 BPM | DIASTOLIC BLOOD PRESSURE: 80 MMHG | SYSTOLIC BLOOD PRESSURE: 139 MMHG | RESPIRATION RATE: 16 BRPM | TEMPERATURE: 98 F | BODY MASS INDEX: 43.06 KG/M2

## 2023-07-18 DIAGNOSIS — R91.1 SOLITARY PULMONARY NODULE: ICD-10-CM

## 2023-07-18 DIAGNOSIS — M19.90 ARTHRITIS: ICD-10-CM

## 2023-07-18 DIAGNOSIS — Z87.891 EX-CIGAR SMOKER: ICD-10-CM

## 2023-07-18 DIAGNOSIS — Z12.39 ENCOUNTER FOR SCREENING FOR MALIGNANT NEOPLASM OF BREAST, UNSPECIFIED SCREENING MODALITY: Primary | ICD-10-CM

## 2023-07-18 PROCEDURE — 99215 OFFICE O/P EST HI 40 MIN: CPT | Mod: PBBFAC | Performed by: STUDENT IN AN ORGANIZED HEALTH CARE EDUCATION/TRAINING PROGRAM

## 2023-07-18 RX ORDER — LEVOTHYROXINE SODIUM 125 UG/1
125 TABLET ORAL DAILY
Qty: 90 TABLET | Refills: 2 | Status: SHIPPED | OUTPATIENT
Start: 2023-07-18

## 2023-07-18 NOTE — PROGRESS NOTES
[facility]    Subjective:      61-year-old with past medical history of rheumatoid arthritis, fibromyalgia, thymoma status post removal, Graves' disease status post thyroidectomy on Synthroid, history of GERD, hypertension, pulmonary nodules today for follow-up. Following up with Dr. Lynch. No complaints today.   Seen in the ED with sciatica like pain. No complaints at this time.     Review of Systems:  10 point ROS negative except for HPI    Objective:   Vital Signs:  Vitals:    07/18/23 0927   BP: 139/80   Pulse: 73   Resp: 16   Temp: 98.2 °F (36.8 °C)      General: well-developed well-nourished in no acute distress  Neck: full range of motion, no thyromegaly or lymphadenopathy  Respiratory: clear to auscultation bilaterally  Cardiovascular: regular rate and rhythm without murmurs, gallops or rubs  Gastrointestinal: soft, non-tender, non-distended with normal bowel sounds, without masses to palpation  Musculoskeletal: MCP PIP wrist elbows shoulders hips knees and ankles tender bilaterally.  Integumentary: no rashes or skin lesions present  Breast: No lumps or nodules noted or tender on palpation  Neurologic: cranial nerves intact, no signs of peripheral neurological deficit, motor/sensory function intact      Body mass index is 42.8 kg/m².         Laboratory:  Lab Results   Component Value Date    WBC 7.1 03/22/2023    HGB 13.2 03/22/2023    HCT 39.4 03/22/2023     03/22/2023    MCV 88.9 03/22/2023    RDW 14.2 03/22/2023    Lab Results   Component Value Date     03/22/2023    K 4.4 03/22/2023    CO2 30 03/22/2023    BUN 12.1 03/22/2023    CREATININE 0.90 03/22/2023    CALCIUM 10.0 03/22/2023      Lab Results   Component Value Date    HGBA1C 4.8 04/07/2022    EAG 91.1 04/07/2022    CREATININE 0.90 03/22/2023    Lab Results   Component Value Date    TSH 3.051 03/22/2023    [id number] 1.06 03/22/2023                Current Medications:  Current Outpatient Medications    Medication Instructions    acyclovir (ZOVIRAX) 800 mg, Oral, 3 times daily, Take within 24-48 hours of an outbreak    albuterol (PROVENTIL/VENTOLIN HFA) 90 mcg/actuation inhaler 2 puffs, Inhalation, Every 6 hours PRN    ascorbic acid (vitamin C) (VITAMIN C) 500 mg, Oral, Daily    Bacillus coagulans 250 million cell Chew Oral, Daily, Takes 2 gummies daily    cyanocobalamin, vitamin B-12, (VITAMIN B-12) 2,500 mcg Subl 1 tablet, Sublingual, Daily    fluticasone-salmeterol diskus inhaler 500-50 mcg 1 puff, Inhalation, 2 times daily, Controller    folic acid (FOLVITE) 1,000 mcg, Oral, Daily    gabapentin (NEURONTIN) 300 mg, Oral, 3 times daily    HUMIRA(CF) PEN 40 mg, Subcutaneous, Weekly    HYDROcodone-acetaminophen (NORCO)  mg per tablet 1 tablet, Oral, Every 8 hours PRN    hydrOXYchloroQUINE (PLAQUENIL) 200 mg, Oral, 2 times daily    levothyroxine (SYNTHROID) 125 mcg, Oral, Daily    meclizine (ANTIVERT) 25 mg, Oral, 3 times daily PRN    methotrexate 2.5 MG Tab TAKE 3 TABLETS BY MOUTH AFTER LUNCH AND TAKE 3 TABLETS AFTER SUPPER ON TUESDAY S    montelukast (SINGULAIR) 10 mg, Oral, Daily    nitroGLYCERIN (NITROSTAT) 0.4 MG SL tablet DISSOLVE ONE TABLET UNDER THE TONGUE EVERY 5 MINUTES AS NEEDED FOR CHEST PAIN. DO NOT EXCEED A TOTAL OF 3 DOSES IN 15 MINUTES. IF PAIN PERSISTS, SEEK MEDICAL ATTENTION    omeprazole (PRILOSEC) 40 mg, Oral, Every morning    tiZANidine (ZANAFLEX) 8 mg, Oral, Nightly    triamterene-hydrochlorothiazide 37.5-25 mg (MAXZIDE-25) 37.5-25 mg per tablet 1 tablet, Oral, Daily        Assessment and Plan:      Seropositive rheumatoid arthritis  FRANK positive, rheumatoid factor positive, CCP positive  Continue Plaquenil 200 mg twice daily  Completed prednisone taper  Continue methotrexate 15 mg and folic acid daily   Continue tizanidine, gabapentin and norco  for pain   Continue following Rheum with Dr. Lynch( last seen 10/19/22)  Continue humira    Fibromyalgia  Denies any muscle pain or  body pain at this time  Continue tizanidine, gabapentin and norco  for pain     Thymoma status post thymectomy 2014  Denies chest pain, hoarse voice, swelling in the face, neck, upper body arms at this time  Was Following ENT , Last seen AT 9/3/20, continue Flonase, antihistamine per ENT    Graves' disease status post thyroidectomy  On Synthroid daily  Denies heart palpitations  ENT referred for a thyroid US with results below :  Ultrasound thyroid on 11/02/2020 shows right thyroidectomy.  1 cm left-sided thyroid nodule.  Hypervascular left level thyroid consistent with thyroiditis.  TFTS wnl 3/23 and Thyroid US on 1/23 stable       Non -CAD   [facility];No interventions done per pt  Was following cards in the past   Last seen cardiologist on 09/30/2020  Lexiscan stress test on 06/24/2020 - for ischemia  Echo done on 08/07/2020 shows left undergo ejection fraction 55 to 60%  Denies any chest pain or SOB at this time    GERD  Patient used to follow up with GI before but lost in touch when covid hits  H. pylori gastritis confirmed by biopsy January 2020, completed antibiotics   fecal occult immunoassay for colon cancer screening was neg in 9/2021  switched from Nexium to Rabeprazole 40 daily at this time  Refused EGD appt scheduled on 11/22 with GI   Interested in doing EGD; called pt to call GI and let them about change of decision      Hypertension- controlled  Continue HCTZ-traimterene    Vertigo   Started on meclizine PRN     Fam hx of breast cancer  Mammogram ordered on 4/23; calling central scheduling for appt  Genetic panel ordered     HSV+ 1 AND 2  Completed Acyclovir treatment; PRN meds for outbreaks   Not having any flares at this time  Syphilis, hep panel  and HIV neg    Follows gyn        Flu shot uptd  mammogram 4/23 referral sent; instructed to  call central scheduling   papsmear appt in 4/23  Refuses colonoscopy and EGD;follows GI  Last DXA in 2020 showed osteopenia ;  repeat DXA 1/23-  osteopenia; continue vitamin d and ca tabs   stable pum nodules for years(last CT scan in 2017); Will order another one to eval  Refuses vaccines  Referral to PT for sciatica and OA       Health Maintenance   Topic Date Due    Mammogram  09/06/2023    Lipid Panel  04/07/2027    TETANUS VACCINE  07/18/2030    Hepatitis C Screening  Completed            RTC in 4 months       Laura Chin DO

## 2023-07-25 ENCOUNTER — TELEPHONE (OUTPATIENT)
Dept: INTERNAL MEDICINE | Facility: CLINIC | Age: 63
End: 2023-07-25

## 2023-07-25 DIAGNOSIS — Z12.39 ENCOUNTER FOR SCREENING FOR MALIGNANT NEOPLASM OF BREAST, UNSPECIFIED SCREENING MODALITY: Primary | ICD-10-CM

## 2023-07-25 NOTE — TELEPHONE ENCOUNTER
Natalie with [facility] called statin ramos for Mrs Perez are incorrect. The order is for CDH1 and that's ususally associated with stomach cancel and the order is for breast. Natalie is asking to speak with someone to make sure she understands what is being ordered. Natalie can be reached at [phone]

## 2023-07-27 ENCOUNTER — OFFICE VISIT (OUTPATIENT)
Dept: GASTROENTEROLOGY | Facility: CLINIC | Age: 63
End: 2023-07-27
Payer: MEDICARE

## 2023-07-27 VITALS
DIASTOLIC BLOOD PRESSURE: 81 MMHG | WEIGHT: 227 LBS | HEART RATE: 64 BPM | TEMPERATURE: 98 F | SYSTOLIC BLOOD PRESSURE: 116 MMHG | HEIGHT: 62 IN | BODY MASS INDEX: 41.77 KG/M2 | RESPIRATION RATE: 16 BRPM | OXYGEN SATURATION: 98 %

## 2023-07-27 DIAGNOSIS — A04.8 H. PYLORI INFECTION: ICD-10-CM

## 2023-07-27 DIAGNOSIS — Z12.11 SCREENING FOR COLON CANCER: ICD-10-CM

## 2023-07-27 DIAGNOSIS — K21.9 GASTROESOPHAGEAL REFLUX DISEASE WITHOUT ESOPHAGITIS: Primary | ICD-10-CM

## 2023-07-27 DIAGNOSIS — K59.09 CHRONIC CONSTIPATION: ICD-10-CM

## 2023-07-27 PROBLEM — R10.13 EPIGASTRIC ABDOMINAL PAIN: Status: RESOLVED | Noted: 2022-07-13 | Resolved: 2023-07-27

## 2023-07-27 PROCEDURE — 3079F DIAST BP 80-89 MM HG: CPT | Mod: CPTII,,, | Performed by: NURSE PRACTITIONER

## 2023-07-27 PROCEDURE — 3074F SYST BP LT 130 MM HG: CPT | Mod: CPTII,,, | Performed by: NURSE PRACTITIONER

## 2023-07-27 PROCEDURE — 99214 PR OFFICE/OUTPT VISIT, EST, LEVL IV, 30-39 MIN: ICD-10-PCS | Mod: S$PBB,,, | Performed by: NURSE PRACTITIONER

## 2023-07-27 PROCEDURE — 99214 OFFICE O/P EST MOD 30 MIN: CPT | Mod: S$PBB,,, | Performed by: NURSE PRACTITIONER

## 2023-07-27 PROCEDURE — 3008F BODY MASS INDEX DOCD: CPT | Mod: CPTII,,, | Performed by: NURSE PRACTITIONER

## 2023-07-27 PROCEDURE — 1160F RVW MEDS BY RX/DR IN RCRD: CPT | Mod: CPTII,,, | Performed by: NURSE PRACTITIONER

## 2023-07-27 PROCEDURE — 1159F MED LIST DOCD IN RCRD: CPT | Mod: CPTII,,, | Performed by: NURSE PRACTITIONER

## 2023-07-27 PROCEDURE — 3008F PR BODY MASS INDEX (BMI) DOCUMENTED: ICD-10-PCS | Mod: CPTII,,, | Performed by: NURSE PRACTITIONER

## 2023-07-27 PROCEDURE — 99215 OFFICE O/P EST HI 40 MIN: CPT | Mod: PBBFAC | Performed by: NURSE PRACTITIONER

## 2023-07-27 PROCEDURE — 3074F PR MOST RECENT SYSTOLIC BLOOD PRESSURE < 130 MM HG: ICD-10-PCS | Mod: CPTII,,, | Performed by: NURSE PRACTITIONER

## 2023-07-27 PROCEDURE — 1159F PR MEDICATION LIST DOCUMENTED IN MEDICAL RECORD: ICD-10-PCS | Mod: CPTII,,, | Performed by: NURSE PRACTITIONER

## 2023-07-27 PROCEDURE — 3079F PR MOST RECENT DIASTOLIC BLOOD PRESSURE 80-89 MM HG: ICD-10-PCS | Mod: CPTII,,, | Performed by: NURSE PRACTITIONER

## 2023-07-27 PROCEDURE — 1160F PR REVIEW ALL MEDS BY PRESCRIBER/CLIN PHARMACIST DOCUMENTED: ICD-10-PCS | Mod: CPTII,,, | Performed by: NURSE PRACTITIONER

## 2023-07-27 NOTE — PROGRESS NOTES
Patient is having a lot of neck pain as well as headaches she is in [place] and is wondering if there is anything she can do to make it go away.  Please call to discuss with her.  Thank You.   Subjective:       Patient ID: Mary Lou Perez is a 62 y.o. female.    Chief Complaint: Abdominal Pain (C/O of nausea and abd pain after eating meals)    This 62-year-old  female with a history of Graves' disease status post right lobe thyroidectomy, thymoma resection, subclinical hypothyroidism, asthma, hypertension, and rheumatoid arthritis presents unaccompanied for a follow-up visit.  She was initially referred for EGD and seen July 13, 2022.  She reported intermittent epigastric abdominal pain described as sharp and crampy for the past several years. She was unable to identify specific triggers and reported relief with defecation. She had associated acid reflux, pyrosis, belching, bloating, and early satiety.  She would have frequent nausea with occasional subsequent vomiting (nonbilious and nonbloody).  She took omeprazole 40 mg daily.  Her appetite was good and her weight was stable.  She denied fever, chills, odynophagia, or dysphagia.  She reported a longstanding history of constipation and going up to several days without a bowel movement.  She would have occasional diarrhea as well.  She had tried MiraLAX for constipation which she stopped secondary to abdominal cramping.  She denied melena, hematochezia, fecal urgency, fecal incontinence, or pain with defecation.    Today, she presents for a follow-up visit.  She reports cancelling EGD procedure as she was afraid to be put to sleep at that time.  She completed Pylera treatment for positive H pylori stool antigen testing but did not complete stool testing to assess for eradication.  She reports minimal change in symptoms from initial visit and denies any change in diet or medications since that time.  She has attempted weight loss since that time and her weight has decreased some.  She continues taking omeprazole 40 mg daily.  She continues with intermittent epigastric abdominal pain described as sharp, crampy, and achy.  She is  unable to identify specific triggers or relieving factors.  She has frequent acid reflux, pyrosis, belching, bloating, and early fullness along with frequent nausea and occasional subsequent vomiting that is nonbilious and nonbloody.  Her appetite is good.  She denies fever, chills, odynophagia, or dysphagia.  She continues with frequent constipation and going up to several days without a bowel movement.  She has occasional diarrhea in between these episodes.  She has tried MiraLax with minimal relief noted which has also caused cramping and bloating.  She denies melena, hematochezia, fecal urgency, fecal incontinence, or pain with defecation.  She thinks that she did take Linzess when it was prescribed in July 2022 and thinks that the medication did help her.     FIT testing negative September 1, 2021. She underwent EGD January 8, 2020 with findings of mild linear erythema in the stomach body and antrum and otherwise normal stomach and duodenum. Pathology revealed mild chronic duodenitis, mild chronic active inflammation in the antrum with no dysplasia identified and numerous Helicobacter-like organisms identified, and mild chronic active gastritis in the stomach body with numerous Helicobacter-like organisms identified.  She takes aspirin 81 mg daily.  She denies tobacco or alcohol use.  She denies a family history of IBD, colon polyps, or colon cancer.    Review of patient's allergies indicates:  No Known Allergies    Past Medical History:   Diagnosis Date    Asthma     Fibromyalgia     Graves' disease     Hypertension     Hypothyroid     Insomnia     Mediastinal mass     Postoperative hypothyroidism     Rheumatoid arthritis     Seropositive rheumatoid arthritis     Thymoma      Past Surgical History:   Procedure Laterality Date    BILATERAL TUBAL LIGATION      BREAST BIOPSY      CARDIAC CATHETERIZATION      DILATION AND CURETTAGE OF UTERUS      ESOPHAGOGASTRODUODENOSCOPY      MEDIASTINAL MASS EXCISION      MOUTH  SURGERY      had bottom teeth pulled    MULTIPLE TOOTH EXTRACTIONS      All teeth now removed    THYROIDECTOMY, PARTIAL       Family History:   family history includes Breast cancer in her mother; Cervical cancer in her daughter; Diabetes in her daughter and mother; Esophageal cancer in her father; Hypertension in her mother.    Social History:    reports that she has never smoked. She has never used smokeless tobacco. She reports that she does not currently use alcohol. She reports that she does not use drugs.    Review of Systems  Negative except as noted in the HPI.      Objective:      Physical Exam  Constitutional:       Appearance: Normal appearance.   HENT:      Head: Normocephalic.      Mouth/Throat:      Mouth: Mucous membranes are moist.   Eyes:      Extraocular Movements: Extraocular movements intact.      Conjunctiva/sclera: Conjunctivae normal.      Pupils: Pupils are equal, round, and reactive to light.   Cardiovascular:      Rate and Rhythm: Normal rate and regular rhythm.      Pulses: Normal pulses.      Heart sounds: Normal heart sounds.   Pulmonary:      Effort: Pulmonary effort is normal.      Breath sounds: Normal breath sounds.   Abdominal:      General: Bowel sounds are normal.      Palpations: Abdomen is soft.   Musculoskeletal:         General: Normal range of motion.      Cervical back: Normal range of motion and neck supple.   Skin:     General: Skin is warm and dry.   Neurological:      General: No focal deficit present.      Mental Status: She is alert and oriented to person, place, and time.   Psychiatric:         Mood and Affect: Mood normal.         Behavior: Behavior normal.         Thought Content: Thought content normal.         Judgment: Judgment normal.       Home Medications:     Current Outpatient Medications   Medication Sig    acyclovir (ZOVIRAX) 800 MG Tab Take 1 tablet (800 mg total) by mouth 3 (three) times daily. Take within 24-48 hours of an outbreak for 2 days     albuterol (PROVENTIL/VENTOLIN HFA) 90 mcg/actuation inhaler Inhale 2 puffs into the lungs every 6 (six) hours as needed.    ascorbic acid, vitamin C, (VITAMIN C) 500 MG tablet Take 500 mg by mouth once daily.    Bacillus coagulans 250 million cell Chew Take by mouth once daily. Takes 2 gummies daily    cyanocobalamin, vitamin B-12, (VITAMIN B-12) 2,500 mcg Subl Place 1 tablet under the tongue Daily.    fluticasone-salmeterol diskus inhaler 500-50 mcg Inhale 1 puff into the lungs 2 (two) times daily. Controller    folic acid (FOLVITE) 1 MG tablet Take 1 tablet (1,000 mcg total) by mouth once daily.    gabapentin (NEURONTIN) 300 MG capsule Take 1 capsule (300 mg total) by mouth 3 (three) times daily.    HUMIRA,CF, PEN 40 mg/0.4 mL PnKt Inject 0.4 mLs (40 mg total) into the skin once a week.    HYDROcodone-acetaminophen (NORCO)  mg per tablet Take 1 tablet by mouth every 8 (eight) hours as needed for Pain.    hydrOXYchloroQUINE (PLAQUENIL) 200 mg tablet Take 1 tablet (200 mg total) by mouth 2 (two) times daily.    levothyroxine (SYNTHROID) 125 MCG tablet Take 1 tablet (125 mcg total) by mouth once daily.    methotrexate 2.5 MG Tab TAKE 3 TABLETS BY MOUTH AFTER LUNCH AND TAKE 3 TABLETS AFTER SUPPER ON TUESDAY S    montelukast (SINGULAIR) 10 mg tablet Take 10 mg by mouth once daily.    nitroGLYCERIN (NITROSTAT) 0.4 MG SL tablet DISSOLVE ONE TABLET UNDER THE TONGUE EVERY 5 MINUTES AS NEEDED FOR CHEST PAIN. DO NOT EXCEED A TOTAL OF 3 DOSES IN 15 MINUTES. IF PAIN PERSISTS, SEEK MEDICAL ATTENTION    omeprazole (PRILOSEC) 40 MG capsule Take 1 capsule (40 mg total) by mouth every morning.    tiZANidine (ZANAFLEX) 4 MG tablet Take 2 tablets (8 mg total) by mouth nightly.    triamterene-hydrochlorothiazide 37.5-25 mg (MAXZIDE-25) 37.5-25 mg per tablet Take 1 tablet by mouth once daily.    meclizine (ANTIVERT) 25 mg tablet Take 1 tablet (25 mg total) by mouth 3 (three) times daily as needed. (Patient not taking: Reported on  7/27/2023)     Laboratory Results:     Recent Results (from the past 4032 hour(s))   Comprehensive Metabolic Panel    Collection Time: 03/22/23 10:43 AM   Result Value Ref Range    Sodium Level 142 136 - 145 mmol/L    Potassium Level 4.4 3.5 - 5.1 mmol/L    Chloride 106 98 - 107 mmol/L    Carbon Dioxide 30 23 - 31 mmol/L    Glucose Level 86 82 - 115 mg/dL    Blood Urea Nitrogen 12.1 9.8 - 20.1 mg/dL    Creatinine 0.90 0.55 - 1.02 mg/dL    Calcium Level Total 10.0 8.4 - 10.2 mg/dL    Protein Total 7.4 5.8 - 7.6 gm/dL    Albumin Level 3.8 3.4 - 4.8 g/dL    Globulin 3.6 (H) 2.4 - 3.5 gm/dL    Albumin/Globulin Ratio 1.1 1.1 - 2.0 ratio    Bilirubin Total 0.8 <=1.5 mg/dL    Alkaline Phosphatase 92 40 - 150 unit/L    Alanine Aminotransferase 13 0 - 55 unit/L    Aspartate Aminotransferase 21 5 - 34 unit/L    eGFR >60 mls/min/1.73/m2   CRP, High Sensitivity    Collection Time: 03/22/23 10:43 AM   Result Value Ref Range    C-Reactive Protein High Sensitivity 14.40 (H) <=5.00 mg/L   Rheumatoid Quantitative    Collection Time: 03/22/23 10:43 AM   Result Value Ref Range    Rheumatoid Factor Quantitative 20 <=30 IU/mL   Hepatitis B Surface Antigen    Collection Time: 03/22/23 10:43 AM   Result Value Ref Range    Hepatitis B Surface Antigen Nonreactive Nonreactive   Hepatitis C Antibody    Collection Time: 03/22/23 10:43 AM   Result Value Ref Range    Hep C Ab Interp Nonreactive Nonreactive   TSH    Collection Time: 03/22/23 10:43 AM   Result Value Ref Range    Thyroid Stimulating Hormone 3.051 0.350 - 4.940 uIU/mL   T4, Free    Collection Time: 03/22/23 10:43 AM   Result Value Ref Range    Thyroxine Free 1.06 0.70 - 1.48 ng/dL   CBC with Differential    Collection Time: 03/22/23 10:43 AM   Result Value Ref Range    WBC 7.1 4.5 - 11.5 x10(3)/mcL    RBC 4.43 4.20 - 5.40 x10(6)/mcL    Hgb 13.2 12.0 - 16.0 g/dL    Hct 39.4 37.0 - 47.0 %    MCV 88.9 80.0 - 94.0 fL    MCH 29.8 pg    MCHC 33.5 33.0 - 36.0 g/dL    RDW 14.2 11.5 - 17.0 %     Platelet 322 130 - 400 x10(3)/mcL    MPV 10.7 (H) 7.4 - 10.4 fL    Neut % 68.7 %    Lymph % 22.9 %    Mono % 6.6 %    Eos % 0.7 %    Basophil % 0.8 %    Lymph # 1.63 0.6 - 4.6 x10(3)/mcL    Neut # 4.89 2.1 - 9.2 x10(3)/mcL    Mono # 0.47 0.1 - 1.3 x10(3)/mcL    Eos # 0.05 0 - 0.9 x10(3)/mcL    Baso # 0.06 0 - 0.2 x10(3)/mcL    IG# 0.02 0 - 0.04 x10(3)/mcL    IG% 0.3 %    NRBC% 0.0 %   CYCLIC CITRULLINATED PEPTIDE (CCP) ANTIBODY    Collection Time: 03/22/23 10:43 AM   Result Value Ref Range    CCP Qual Positive (A) Negative   FRANK IgG by IFA    Collection Time: 03/22/23 10:43 AM   Result Value Ref Range    Antinuclear Ab, HEp-2 Substrate Positive 1:640 (A) <1:80 (Negative)    FRANK Titer: 1:640     FRANK Pattern: Homogeneous    Hepatitis C Antibody    Collection Time: 04/20/23  9:48 AM   Result Value Ref Range    Hep C Ab Interp Nonreactive Nonreactive   Hepatitis B Surface Antigen    Collection Time: 04/20/23  9:48 AM   Result Value Ref Range    Hepatitis B Surface Antigen Nonreactive Nonreactive   SYPHILIS ANTIBODY (WITH REFLEX RPR)    Collection Time: 04/20/23  9:48 AM   Result Value Ref Range    Syphilis Antibody Nonreactive Nonreactive, Equivocal   HIV 1/2 Ag/Ab (4th Gen)    Collection Time: 04/20/23  9:48 AM   Result Value Ref Range    HIV Nonreactive Nonreactive   HSV 1 & 2, IgG    Collection Time: 04/20/23  9:48 AM   Result Value Ref Range    HSV Type 1 Ab, IgG, S Positive Negative    HSV Type 2 Ab, IgG, S Positive Negative   Herpes simplex virus culture [facility]; Vagina    Collection Time: 04/20/23  9:50 AM    Specimen: Vagina; SWAB   Result Value Ref Range    Culture, HSV Final SEE NOTE    Liquid-Based Pap Smear, Screening Screening    Collection Time: 04/20/23  9:51 AM   Result Value Ref Range    Pathology Result     Wet Prep, Genital    Collection Time: 04/20/23  9:51 AM    Specimen: Cervicovaginal; Genital   Result Value Ref Range    WBC, Wet Prep Moderate (A) None Seen    Clue Cells, Wet Prep None Seen  None Seen    Trichomonas, Wet Prep None Seen None Seen    Yeast, Wet Prep None Seen None Seen   D dimer, quantitative    Collection Time: 05/18/23  9:56 AM   Result Value Ref Range    D-Dimer 3.04 (H) 0.00 - 0.50 ug/mL FEU     Assessment/Plan:     Problem List Items Addressed This Visit          GI    GERD (gastroesophageal reflux disease) - Primary     Positive H. Pylori stool antigen and treated with Pylera for 10 days  GERD lifestyle modifications  Reflux precautions  Avoid NSAID use  EGD  H. Pylori stool antigen to assess for eradication  Continue omeprazole 40 mg daily  Call with updates         Relevant Orders    Helicobacter Pylori Antigen Fecal EIA    H. pylori infection     See above         Relevant Orders    Helicobacter Pylori Antigen Fecal EIA    Screening for colon cancer     Cologuard testing ordered         Relevant Orders    Cologuard Screening (Multitarget Stool DNA)    Chronic constipation     Recommend soluble fiber supplementation  Avoid straining or sitting on the toilet for long periods of time  Start Linzess 145 mcg daily  Call with updates         Relevant Medications    linaCLOtide (LINZESS) 145 mcg Cap capsule

## 2023-07-27 NOTE — ASSESSMENT & PLAN NOTE
Recommend soluble fiber supplementation  Avoid straining or sitting on the toilet for long periods of time  Start Linzess 145 mcg daily  Call with updates

## 2023-07-27 NOTE — ASSESSMENT & PLAN NOTE
Positive H. Pylori stool antigen and treated with Pylera for 10 days  GERD lifestyle modifications  Reflux precautions  Avoid NSAID use  EGD  H. Pylori stool antigen to assess for eradication  Continue omeprazole 40 mg daily  Call with updates

## 2023-08-04 ENCOUNTER — TELEPHONE (OUTPATIENT)
Dept: GASTROENTEROLOGY | Facility: CLINIC | Age: 63
End: 2023-08-04
Payer: MEDICARE

## 2023-08-04 ENCOUNTER — HOSPITAL ENCOUNTER (OUTPATIENT)
Dept: RADIOLOGY | Facility: HOSPITAL | Age: 63
Discharge: HOME | End: 2023-08-04
Attending: STUDENT IN AN ORGANIZED HEALTH CARE EDUCATION/TRAINING PROGRAM
Payer: MEDICARE

## 2023-08-04 DIAGNOSIS — R91.1 SOLITARY PULMONARY NODULE: ICD-10-CM

## 2023-08-04 DIAGNOSIS — M05.9 SEROPOSITIVE RHEUMATOID ARTHRITIS: ICD-10-CM

## 2023-08-04 DIAGNOSIS — Z87.891 EX-CIGAR SMOKER: ICD-10-CM

## 2023-08-04 DIAGNOSIS — M79.7 FIBROMYALGIA: ICD-10-CM

## 2023-08-04 PROCEDURE — 71271 CT THORAX LUNG CANCER SCR C-: CPT | Mod: TC

## 2023-08-04 RX ORDER — HYDROCODONE BITARTRATE AND ACETAMINOPHEN 10; 325 MG/1; MG/1
1 TABLET ORAL EVERY 8 HOURS PRN
Qty: 90 TABLET | Refills: 0 | Status: SHIPPED | OUTPATIENT
Start: 2023-08-04 | End: 2023-09-06 | Stop reason: SDUPTHER

## 2023-08-04 NOTE — TELEPHONE ENCOUNTER
----- Message from GENEVA Castanon sent at 8/4/2023 11:31 AM CDT -----  Please notify H pylori stool test negative.  Thanks

## 2023-08-10 RX ORDER — TRIAMTERENE/HYDROCHLOROTHIAZID 37.5-25 MG
1 TABLET ORAL DAILY
Qty: 90 TABLET | Refills: 2 | Status: SHIPPED | OUTPATIENT
Start: 2023-08-10

## 2023-08-21 ENCOUNTER — TELEPHONE (OUTPATIENT)
Dept: RHEUMATOLOGY | Facility: CLINIC | Age: 63
End: 2023-08-21
Payer: MEDICARE

## 2023-08-21 NOTE — TELEPHONE ENCOUNTER
----- Message from Lucy Samuel sent at 8/21/2023 10:45 AM CDT -----  Regarding: Handicap Tag  Patient called requesting a handicap tag. Please mail to address on file.

## 2023-08-23 ENCOUNTER — TELEPHONE (OUTPATIENT)
Dept: GYNECOLOGY | Facility: CLINIC | Age: 63
End: 2023-08-23
Payer: MEDICARE

## 2023-08-23 RX ORDER — ACYCLOVIR 800 MG/1
800 TABLET ORAL 3 TIMES DAILY
Qty: 6 TABLET | Refills: 3 | Status: SHIPPED | OUTPATIENT
Start: 2023-08-23 | End: 2024-03-27

## 2023-08-23 NOTE — TELEPHONE ENCOUNTER
Informed pt her acyclovir was refilled and sent to her pharmacy on file. Pt verbalized understanding.

## 2023-08-30 LAB — NONINV COLON CA DNA+OCC BLD SCRN STL QL: NEGATIVE

## 2023-08-31 ENCOUNTER — TELEPHONE (OUTPATIENT)
Dept: ADMINISTRATIVE | Facility: HOSPITAL | Age: 63
End: 2023-08-31

## 2023-08-31 ENCOUNTER — TELEPHONE (OUTPATIENT)
Dept: RHEUMATOLOGY | Facility: CLINIC | Age: 63
End: 2023-08-31
Payer: MEDICARE

## 2023-08-31 NOTE — TELEPHONE ENCOUNTER
TELEPHONE VOICEMAIL MESSAGE:      MS. GARDINER CALLED VIA [facility] STATING THAT SHE HAS A COUGH AND HAS BEEN SICK SINCE SUNDAY.  I TRIED TO CALL HER BACK, BUT NO VMB, FULL AND UNABLE TO LEAVE A VM.

## 2023-08-31 NOTE — TELEPHONE ENCOUNTER
I spoke with Dr. Sierra, he wants the patient to hold Humira for 14 days.  Patient verbalized understanding.

## 2023-08-31 NOTE — TELEPHONE ENCOUNTER
----- Message from Lucy Samuel sent at 8/31/2023  2:01 PM CDT -----  Regarding: Medication Clarification  Patient called stating she was diagnosed with Covid on today and needs to know if she can take the Humira injection today. Please return call @ [phone]

## 2023-09-06 DIAGNOSIS — M05.9 SEROPOSITIVE RHEUMATOID ARTHRITIS: ICD-10-CM

## 2023-09-06 DIAGNOSIS — M79.7 FIBROMYALGIA: ICD-10-CM

## 2023-09-06 RX ORDER — HYDROCODONE BITARTRATE AND ACETAMINOPHEN 10; 325 MG/1; MG/1
1 TABLET ORAL EVERY 8 HOURS PRN
Qty: 90 TABLET | Refills: 0 | Status: SHIPPED | OUTPATIENT
Start: 2023-09-06 | End: 2023-10-05 | Stop reason: SDUPTHER

## 2023-09-06 NOTE — TELEPHONE ENCOUNTER
----- Message from Lucy Samuel sent at 9/6/2023  3:17 PM CDT -----  Regarding: Medication refill  Patient called requesting a refill for [place]. [facility] in [place] on [address].

## 2023-09-15 ENCOUNTER — OFFICE VISIT (OUTPATIENT)
Dept: RHEUMATOLOGY | Facility: CLINIC | Age: 63
End: 2023-09-15
Payer: MEDICARE

## 2023-09-15 VITALS
HEIGHT: 62 IN | TEMPERATURE: 99 F | RESPIRATION RATE: 18 BRPM | SYSTOLIC BLOOD PRESSURE: 102 MMHG | BODY MASS INDEX: 42.51 KG/M2 | WEIGHT: 231 LBS | DIASTOLIC BLOOD PRESSURE: 65 MMHG | OXYGEN SATURATION: 99 % | HEART RATE: 74 BPM

## 2023-09-15 DIAGNOSIS — M05.9 SEROPOSITIVE RHEUMATOID ARTHRITIS: Primary | ICD-10-CM

## 2023-09-15 DIAGNOSIS — M79.7 FIBROMYALGIA: ICD-10-CM

## 2023-09-15 DIAGNOSIS — K59.09 CHRONIC CONSTIPATION: ICD-10-CM

## 2023-09-15 DIAGNOSIS — F51.01 PRIMARY INSOMNIA: ICD-10-CM

## 2023-09-15 DIAGNOSIS — D49.89 THYMOMA: ICD-10-CM

## 2023-09-15 DIAGNOSIS — K21.9 GASTROESOPHAGEAL REFLUX DISEASE WITHOUT ESOPHAGITIS: ICD-10-CM

## 2023-09-15 DIAGNOSIS — G47.00 INSOMNIA, UNSPECIFIED TYPE: ICD-10-CM

## 2023-09-15 DIAGNOSIS — Z87.898 H/O THYMOMA: ICD-10-CM

## 2023-09-15 PROCEDURE — 3078F DIAST BP <80 MM HG: CPT | Mod: CPTII,S$GLB,, | Performed by: INTERNAL MEDICINE

## 2023-09-15 PROCEDURE — 1160F PR REVIEW ALL MEDS BY PRESCRIBER/CLIN PHARMACIST DOCUMENTED: ICD-10-PCS | Mod: CPTII,S$GLB,, | Performed by: INTERNAL MEDICINE

## 2023-09-15 PROCEDURE — 99214 OFFICE O/P EST MOD 30 MIN: CPT | Mod: S$GLB,,, | Performed by: INTERNAL MEDICINE

## 2023-09-15 PROCEDURE — 99214 PR OFFICE/OUTPT VISIT, EST, LEVL IV, 30-39 MIN: ICD-10-PCS | Mod: S$GLB,,, | Performed by: INTERNAL MEDICINE

## 2023-09-15 PROCEDURE — 99999 PR PBB SHADOW E&M-EST. PATIENT-LVL IV: CPT | Mod: PBBFAC,,, | Performed by: INTERNAL MEDICINE

## 2023-09-15 PROCEDURE — 1159F PR MEDICATION LIST DOCUMENTED IN MEDICAL RECORD: ICD-10-PCS | Mod: CPTII,S$GLB,, | Performed by: INTERNAL MEDICINE

## 2023-09-15 PROCEDURE — 3074F SYST BP LT 130 MM HG: CPT | Mod: CPTII,S$GLB,, | Performed by: INTERNAL MEDICINE

## 2023-09-15 PROCEDURE — 3008F PR BODY MASS INDEX (BMI) DOCUMENTED: ICD-10-PCS | Mod: CPTII,S$GLB,, | Performed by: INTERNAL MEDICINE

## 2023-09-15 PROCEDURE — 3008F BODY MASS INDEX DOCD: CPT | Mod: CPTII,S$GLB,, | Performed by: INTERNAL MEDICINE

## 2023-09-15 PROCEDURE — 3078F PR MOST RECENT DIASTOLIC BLOOD PRESSURE < 80 MM HG: ICD-10-PCS | Mod: CPTII,S$GLB,, | Performed by: INTERNAL MEDICINE

## 2023-09-15 PROCEDURE — 99999 PR PBB SHADOW E&M-EST. PATIENT-LVL IV: ICD-10-PCS | Mod: PBBFAC,,, | Performed by: INTERNAL MEDICINE

## 2023-09-15 PROCEDURE — 3074F PR MOST RECENT SYSTOLIC BLOOD PRESSURE < 130 MM HG: ICD-10-PCS | Mod: CPTII,S$GLB,, | Performed by: INTERNAL MEDICINE

## 2023-09-15 PROCEDURE — 1160F RVW MEDS BY RX/DR IN RCRD: CPT | Mod: CPTII,S$GLB,, | Performed by: INTERNAL MEDICINE

## 2023-09-15 PROCEDURE — 1159F MED LIST DOCD IN RCRD: CPT | Mod: CPTII,S$GLB,, | Performed by: INTERNAL MEDICINE

## 2023-09-15 RX ORDER — HYDROXYCHLOROQUINE SULFATE 200 MG/1
200 TABLET, FILM COATED ORAL 2 TIMES DAILY
Qty: 180 TABLET | Refills: 3 | Status: SHIPPED | OUTPATIENT
Start: 2023-09-15

## 2023-09-15 RX ORDER — FOLIC ACID 1 MG/1
1000 TABLET ORAL DAILY
Qty: 90 TABLET | Refills: 3 | Status: SHIPPED | OUTPATIENT
Start: 2023-09-15

## 2023-09-15 RX ORDER — METHOTREXATE 2.5 MG/1
TABLET ORAL
Qty: 90 TABLET | Refills: 3 | Status: SHIPPED | OUTPATIENT
Start: 2023-09-15

## 2023-09-15 RX ORDER — TIZANIDINE 4 MG/1
8 TABLET ORAL NIGHTLY
Qty: 180 TABLET | Refills: 3 | Status: SHIPPED | OUTPATIENT
Start: 2023-09-15 | End: 2024-09-09

## 2023-09-15 RX ORDER — LANOLIN ALCOHOL/MO/W.PET/CERES
400 CREAM (GRAM) TOPICAL NIGHTLY
Qty: 90 TABLET | Refills: 3 | Status: SHIPPED | OUTPATIENT
Start: 2023-09-15 | End: 2024-03-27

## 2023-09-15 RX ORDER — FLUTICASONE PROPIONATE 50 MCG
1 SPRAY, SUSPENSION (ML) NASAL
COMMUNITY
Start: 2023-08-31

## 2023-09-15 RX ORDER — GABAPENTIN 300 MG/1
300 CAPSULE ORAL 3 TIMES DAILY
Qty: 270 CAPSULE | Refills: 5 | Status: SHIPPED | OUTPATIENT
Start: 2023-09-15

## 2023-09-15 RX ORDER — OMEPRAZOLE 40 MG/1
40 CAPSULE, DELAYED RELEASE ORAL EVERY MORNING
Qty: 90 CAPSULE | Refills: 3 | Status: SHIPPED | OUTPATIENT
Start: 2023-09-15

## 2023-09-15 NOTE — PROGRESS NOTES
"Subjective:       Patient ID: Mary Lou Perez is a 62 y.o. female.    Chief Complaint: Thymoma (4 month follow up/No complaints at the moment)    The patient is complaining of joint pain involving the MCP PIP wrist elbow shoulders hips knees and ankles bilaterally.  The pain is 4/10 in intensity dull in quality and continuous.  That is associated with a morning stiffness lasting for more than 60 minutes.  Is also having difficulty maintaining a good night of sleep.  This has been associated with myalgias.  Muscle aches are 3/10 in intensity dull in quality and continuous.  They are associated with fatigue.  No fever no chills no others.  Labs checked during this visit         Review of Systems   Constitutional:  Negative for appetite change, chills and fever.   HENT:  Negative for congestion, ear pain, mouth sores, nosebleeds and trouble swallowing.    Eyes:  Negative for photophobia and discharge.   Respiratory:  Negative for chest tightness and shortness of breath.    Cardiovascular:  Negative for chest pain.   Gastrointestinal:  Negative for abdominal pain and vomiting.   Endocrine: Negative.    Genitourinary:  Negative for hematuria.   Musculoskeletal:         As per HPI   Skin:  Negative for rash.   Neurological:  Negative for weakness.         Objective:   /65   Pulse 74   Temp 98.7 °F (37.1 °C) (Oral)   Resp 18   Ht 5' 2" (1.575 m)   Wt 104.8 kg (231 lb)   SpO2 99%   BMI 42.25 kg/m²      Physical Exam   Constitutional: She is oriented to person, place, and time. She appears well-developed and well-nourished. No distress.   HENT:   Head: Normocephalic and atraumatic.   Right Ear: External ear normal.   Left Ear: External ear normal.   Eyes: Pupils are equal, round, and reactive to light.   Cardiovascular: Normal rate, regular rhythm and normal heart sounds.   Pulmonary/Chest: Breath sounds normal.   Abdominal: Soft. There is no abdominal tenderness.   Musculoskeletal:      Right shoulder: " Tenderness present.      Left shoulder: Tenderness present.      Right elbow: Tenderness present.      Left elbow: Tenderness present.      Right wrist: Tenderness present.      Left wrist: Tenderness present.      Cervical back: Neck supple.      Right hip: Tenderness present.      Left hip: Tenderness present.      Right knee: Tenderness present.      Left knee: Tenderness present.      Right ankle: Tenderness present.      Left ankle: Tenderness present.   Lymphadenopathy:     She has no cervical adenopathy.   Neurological: She is alert and oriented to person, place, and time. She displays normal reflexes. No cranial nerve deficit or sensory deficit. She exhibits normal muscle tone. Coordination normal.   Skin: No rash noted. No erythema.   Vitals reviewed.      Right Side Rheumatological Exam     The patient is tender to palpation of the shoulder, elbow, wrist, knee, 1st PIP, 1st MCP, 2nd PIP, 2nd MCP, 3rd PIP, 3rd MCP, 4th PIP, 4th MCP, 5th PIP, hip, ankle, 1st MTP, 2nd MTP, 3rd MTP, 4th MTP, 5th MTP, 1st toe IP, 2nd toe IP, 3rd toe IP, 4th toe IP and 5th toe IP    Left Side Rheumatological Exam     The patient is tender to palpation of the shoulder, elbow, wrist, knee, 1st PIP, 1st MCP, 2nd PIP, 2nd MCP, 3rd PIP, 3rd MCP, 4th PIP, 4th MCP, 5th PIP, 5th MCP, hip, ankle, 1st MTP, 2nd MTP, 3rd MTP, 4th MTP, 5th MTP, 1st toe IP, 2nd toe IP, 3rd toe IP, 4th toe IP and 5th toe IP.         Completed Fibromyalgia exam 18/18 tender points.  No data to display     Assessment:       1. Seropositive rheumatoid arthritis    2. Thymoma    3. Gastroesophageal reflux disease without esophagitis    4. Chronic constipation    5. Fibromyalgia    6. Insomnia, unspecified type    7. H/O thymoma    8. Primary insomnia          Medication List with Changes/Refills   New Medications    MAGNESIUM OXIDE (MAG-OX) 400 MG (241.3 MG MAGNESIUM) TABLET    Take 1 tablet (400 mg total) by mouth nightly.       Start Date: 9/15/2023 End Date: --    Current Medications    ACYCLOVIR (ZOVIRAX) 800 MG TAB    Take 1 tablet (800 mg total) by mouth 3 (three) times daily. Take within 24-48 hours of an outbreak for 2 days       Start Date: 8/23/2023 End Date: 8/25/2023    ALBUTEROL (PROVENTIL/VENTOLIN HFA) 90 MCG/ACTUATION INHALER    Inhale 2 puffs into the lungs every 6 (six) hours as needed.       Start Date: --        End Date: --    ASCORBIC ACID, VITAMIN C, (VITAMIN C) 500 MG TABLET    Take 500 mg by mouth once daily.       Start Date: --        End Date: --    BACILLUS COAGULANS 250 MILLION CELL CHEW    Take by mouth once daily. Takes 2 gummies daily       Start Date: --        End Date: --    CYANOCOBALAMIN, VITAMIN B-12, (VITAMIN B-12) 2,500 MCG SUBL    Place 1 tablet under the tongue Daily.       Start Date: 3/22/2023 End Date: --    FLUTICASONE PROPIONATE (FLONASE) 50 MCG/ACTUATION NASAL SPRAY    1 spray by Each Nostril route.       Start Date: 8/31/2023 End Date: --    FLUTICASONE-SALMETEROL DISKUS INHALER 500-50 MCG    Inhale 1 puff into the lungs 2 (two) times daily. Controller       Start Date: 11/21/2022End Date: --    HUMIRA,CF, PEN 40 MG/0.4 ML PNKT    Inject 0.4 mLs (40 mg total) into the skin once a week.       Start Date: 3/22/2023 End Date: --    HYDROCODONE-ACETAMINOPHEN (NORCO)  MG PER TABLET    Take 1 tablet by mouth every 8 (eight) hours as needed for Pain.       Start Date: 9/6/2023  End Date: 10/6/2023    LEVOTHYROXINE (SYNTHROID) 125 MCG TABLET    Take 1 tablet (125 mcg total) by mouth once daily.       Start Date: 7/18/2023 End Date: --    LINACLOTIDE (LINZESS) 145 MCG CAP CAPSULE    Take 1 capsule (145 mcg total) by mouth before breakfast.       Start Date: 7/27/2023 End Date: --    MECLIZINE (ANTIVERT) 25 MG TABLET    Take 1 tablet (25 mg total) by mouth 3 (three) times daily as needed.       Start Date: 12/7/2022 End Date: --    MONTELUKAST (SINGULAIR) 10 MG TABLET    Take 10 mg by mouth once daily.       Start Date: --         End Date: --    NITROGLYCERIN (NITROSTAT) 0.4 MG SL TABLET    DISSOLVE ONE TABLET UNDER THE TONGUE EVERY 5 MINUTES AS NEEDED FOR CHEST PAIN. DO NOT EXCEED A TOTAL OF 3 DOSES IN 15 MINUTES. IF PAIN PERSISTS, SEEK MEDICAL ATTENTION       Start Date: 1/31/2022 End Date: --    TRIAMTERENE-HYDROCHLOROTHIAZIDE 37.5-25 MG (MAXZIDE-25) 37.5-25 MG PER TABLET    Take 1 tablet by mouth once daily.       Start Date: 8/10/2023 End Date: --   Changed and/or Refilled Medications    Modified Medication Previous Medication    FOLIC ACID (FOLVITE) 1 MG TABLET folic acid (FOLVITE) 1 MG tablet       Take 1 tablet (1,000 mcg total) by mouth once daily.    Take 1 tablet (1,000 mcg total) by mouth once daily.       Start Date: 9/15/2023 End Date: --    Start Date: 3/22/2023 End Date: 9/15/2023    GABAPENTIN (NEURONTIN) 300 MG CAPSULE gabapentin (NEURONTIN) 300 MG capsule       Take 1 capsule (300 mg total) by mouth 3 (three) times daily.    Take 1 capsule (300 mg total) by mouth 3 (three) times daily.       Start Date: 9/15/2023 End Date: --    Start Date: 3/22/2023 End Date: 9/15/2023    HYDROXYCHLOROQUINE (PLAQUENIL) 200 MG TABLET hydrOXYchloroQUINE (PLAQUENIL) 200 mg tablet       Take 1 tablet (200 mg total) by mouth 2 (two) times daily.    Take 1 tablet (200 mg total) by mouth 2 (two) times daily.       Start Date: 9/15/2023 End Date: --    Start Date: 3/22/2023 End Date: 9/15/2023    METHOTREXATE 2.5 MG TAB methotrexate 2.5 MG Tab       TAKE 3 TABLETS BY MOUTH AFTER LUNCH AND TAKE 3 TABLETS AFTER SUPPER ON TUESDAY S    TAKE 3 TABLETS BY MOUTH AFTER LUNCH AND TAKE 3 TABLETS AFTER SUPPER ON TUESDAY S       Start Date: 9/15/2023 End Date: --    Start Date: 3/22/2023 End Date: 9/15/2023    OMEPRAZOLE (PRILOSEC) 40 MG CAPSULE omeprazole (PRILOSEC) 40 MG capsule       Take 1 capsule (40 mg total) by mouth every morning.    Take 1 capsule (40 mg total) by mouth every morning.       Start Date: 9/15/2023 End Date: --    Start Date: 3/22/2023  End Date: 9/15/2023    TIZANIDINE (ZANAFLEX) 4 MG TABLET tiZANidine (ZANAFLEX) 4 MG tablet       Take 2 tablets (8 mg total) by mouth nightly.    Take 2 tablets (8 mg total) by mouth nightly.       Start Date: 9/15/2023 End Date: 9/9/2024    Start Date: 3/22/2023 End Date: 9/15/2023         Plan:         Problem List Items Addressed This Visit          Immunology/Multi System    Seropositive rheumatoid arthritis - Primary    Relevant Medications    gabapentin (NEURONTIN) 300 MG capsule    hydroxychloroquine (PLAQUENIL) 200 mg tablet    methotrexate 2.5 MG Tab    omeprazole (PRILOSEC) 40 MG capsule    tiZANidine (ZANAFLEX) 4 MG tablet    folic acid (FOLVITE) 1 MG tablet    magnesium oxide (MAG-OX) 400 mg (241.3 mg magnesium) tablet       Oncology    Thymoma    Relevant Medications    gabapentin (NEURONTIN) 300 MG capsule    hydroxychloroquine (PLAQUENIL) 200 mg tablet    methotrexate 2.5 MG Tab    omeprazole (PRILOSEC) 40 MG capsule    tiZANidine (ZANAFLEX) 4 MG tablet    folic acid (FOLVITE) 1 MG tablet    magnesium oxide (MAG-OX) 400 mg (241.3 mg magnesium) tablet       GI    GERD (gastroesophageal reflux disease)    Relevant Medications    gabapentin (NEURONTIN) 300 MG capsule    hydroxychloroquine (PLAQUENIL) 200 mg tablet    methotrexate 2.5 MG Tab    omeprazole (PRILOSEC) 40 MG capsule    tiZANidine (ZANAFLEX) 4 MG tablet    folic acid (FOLVITE) 1 MG tablet    magnesium oxide (MAG-OX) 400 mg (241.3 mg magnesium) tablet    Chronic constipation    Relevant Medications    gabapentin (NEURONTIN) 300 MG capsule    hydroxychloroquine (PLAQUENIL) 200 mg tablet    methotrexate 2.5 MG Tab    omeprazole (PRILOSEC) 40 MG capsule    tiZANidine (ZANAFLEX) 4 MG tablet    folic acid (FOLVITE) 1 MG tablet    magnesium oxide (MAG-OX) 400 mg (241.3 mg magnesium) tablet       Orthopedic    Fibromyalgia    Relevant Medications    gabapentin (NEURONTIN) 300 MG capsule    hydroxychloroquine (PLAQUENIL) 200 mg tablet    methotrexate  2.5 MG Tab    omeprazole (PRILOSEC) 40 MG capsule    tiZANidine (ZANAFLEX) 4 MG tablet    folic acid (FOLVITE) 1 MG tablet    magnesium oxide (MAG-OX) 400 mg (241.3 mg magnesium) tablet       Other    Insomnia    Relevant Medications    gabapentin (NEURONTIN) 300 MG capsule    hydroxychloroquine (PLAQUENIL) 200 mg tablet    methotrexate 2.5 MG Tab    omeprazole (PRILOSEC) 40 MG capsule    tiZANidine (ZANAFLEX) 4 MG tablet    folic acid (FOLVITE) 1 MG tablet    magnesium oxide (MAG-OX) 400 mg (241.3 mg magnesium) tablet     Other Visit Diagnoses       H/O thymoma        Relevant Medications    gabapentin (NEURONTIN) 300 MG capsule    hydroxychloroquine (PLAQUENIL) 200 mg tablet    methotrexate 2.5 MG Tab    omeprazole (PRILOSEC) 40 MG capsule    tiZANidine (ZANAFLEX) 4 MG tablet    folic acid (FOLVITE) 1 MG tablet    magnesium oxide (MAG-OX) 400 mg (241.3 mg magnesium) tablet

## 2023-10-05 DIAGNOSIS — M05.9 SEROPOSITIVE RHEUMATOID ARTHRITIS: ICD-10-CM

## 2023-10-05 DIAGNOSIS — M79.7 FIBROMYALGIA: ICD-10-CM

## 2023-10-05 RX ORDER — HYDROCODONE BITARTRATE AND ACETAMINOPHEN 10; 325 MG/1; MG/1
1 TABLET ORAL EVERY 8 HOURS PRN
Qty: 90 TABLET | Refills: 0 | Status: SHIPPED | OUTPATIENT
Start: 2023-10-05 | End: 2023-11-07 | Stop reason: SDUPTHER

## 2023-10-05 NOTE — TELEPHONE ENCOUNTER
----- Message from Lucy Samuel sent at 10/5/2023 10:17 AM CDT -----  Regarding: Medication refill  Patient [facility] [address] in [place].

## 2023-10-06 ENCOUNTER — TELEPHONE (OUTPATIENT)
Dept: INTERNAL MEDICINE | Facility: CLINIC | Age: 63
End: 2023-10-06

## 2023-10-06 NOTE — TELEPHONE ENCOUNTER
Pt called stating she has COVID back in September and has been dealing with thrash in her mouth ever since. Pt requesting an antibiotic to help get rid of it.

## 2023-10-09 NOTE — TELEPHONE ENCOUNTER
"Pt identified using name and date of birth  Spoke with patient who states she has "white bumps in my mouth comes and goes since I had Covid 1 month ago". Pt states she is using home remedies but it is not going away. She is not on antibiotics Please advise   "

## 2023-11-06 ENCOUNTER — TELEPHONE (OUTPATIENT)
Dept: INTERNAL MEDICINE | Facility: CLINIC | Age: 63
End: 2023-11-06
Payer: MEDICARE

## 2023-11-06 DIAGNOSIS — M79.7 FIBROMYALGIA: ICD-10-CM

## 2023-11-06 DIAGNOSIS — M05.9 SEROPOSITIVE RHEUMATOID ARTHRITIS: ICD-10-CM

## 2023-11-06 RX ORDER — HYDROCODONE BITARTRATE AND ACETAMINOPHEN 10; 325 MG/1; MG/1
1 TABLET ORAL EVERY 8 HOURS PRN
Qty: 90 TABLET | Refills: 0 | Status: CANCELLED | OUTPATIENT
Start: 2023-11-06 | End: 2023-12-06

## 2023-11-06 NOTE — TELEPHONE ENCOUNTER
GOOD MORNING DR. FOSTER,     MS. GARDINER IS REQUESTING MEDICATION FOR SINUS ISSUES & VERTIGO.  SHE STATED THAT SHE HAS INTERMITTENT (B) EAR PAIN.  SHE STATED THAT YOU ARE AWARE THAT HER EARS BACK UP FROM TIME TO TIME.  SHE DENIES DRAINAGE OR FEVER.      PLEASE ADVISE.    
[FreeTextEntry3] : I, Harry Marianna, acted solely as a scribe for Beronica Gilmore NP, on 11/17/2021.\par \par All medical record entries made by the scribe were at my, Beronica Gilmore NP, direction and personally dictated by me on 11/17/2021. I have reviewed the chart and agree that the record accurately reflects my personal performance of the history, physical exam, assessment and plan. I have also personally directed, reviewed, and agreed with the chart.

## 2023-11-07 DIAGNOSIS — M05.9 SEROPOSITIVE RHEUMATOID ARTHRITIS: ICD-10-CM

## 2023-11-07 DIAGNOSIS — M79.7 FIBROMYALGIA: ICD-10-CM

## 2023-11-07 RX ORDER — HYDROCODONE BITARTRATE AND ACETAMINOPHEN 10; 325 MG/1; MG/1
1 TABLET ORAL EVERY 8 HOURS PRN
Qty: 90 TABLET | Refills: 0 | Status: SHIPPED | OUTPATIENT
Start: 2023-11-07 | End: 2023-12-07

## 2023-11-08 RX ORDER — FLUCONAZOLE 100 MG/1
100 TABLET ORAL DAILY
Qty: 14 TABLET | Refills: 0 | Status: SHIPPED | OUTPATIENT
Start: 2023-11-08 | End: 2023-11-22

## 2023-11-15 ENCOUNTER — TELEPHONE (OUTPATIENT)
Dept: GYNECOLOGY | Facility: CLINIC | Age: 63
End: 2023-11-15
Payer: MEDICARE

## 2023-11-15 DIAGNOSIS — N95.0 PMB (POSTMENOPAUSAL BLEEDING): Primary | ICD-10-CM

## 2023-11-15 NOTE — TELEPHONE ENCOUNTER
Called patient to inquire about her complaints. Patient states she had not noticed any bleeding/spotting in about 5 yrs. Last night she noticed that when she wiped there was reddish/pink spotting on the toilet paper and is continuing to happen into today. She was previously counseled that if she started bleeding again then she needed to call us. I informed patient that she did the right then and I will send Ms Mcknight a message to advise on how to proceed. Patient verbalized understanding.     Please advise. Thank you.     Previous annual: 23  HPI:  The patient is  here for annual gyn exam. Denies hx of abnormal pap. Pt is postmenopausal. She had an episode of PMB in 2019 and completed hysteroscopy D&C/polypectomy. She denies any further bleeding.    ----- Message from Jarad Jones sent at 11/15/2023  8:26 AM CST -----  Regarding: Patient Problem  The patient above called because she has not had a cycle in years and is starting to have some spotting and would like to see about getting an appointment. Please advise, Thanks.

## 2023-11-15 NOTE — TELEPHONE ENCOUNTER
Informed pt that the provider ordered a pelvic US and she contact Centralized Scheduling at [phone]. Informed pt she is also scheduled with our GYN care team on 12/27/23 at 1:15pm.  Pt verbalized understanding and accepted appointment date and time.

## 2023-11-27 ENCOUNTER — TELEPHONE (OUTPATIENT)
Dept: RHEUMATOLOGY | Facility: CLINIC | Age: 63
End: 2023-11-27
Payer: MEDICARE

## 2023-11-27 NOTE — TELEPHONE ENCOUNTER
----- Message from Dipti Jones sent at 11/27/2023  8:49 AM CST -----  Regarding: FW: Can pt be scheduled ?    ----- Message -----  From: Dipti Jones  Sent: 11/22/2023   2:23 PM CST  To: [facility] Rheumatology Clinical Support Staff  Subject: Can pt be scheduled ?                            Pt was following DR. Sierra and saw him here @ [facility]. Pt called to schedule an appt with our rheum provider as Dr. Sierra moved. Pt can be reached @ [phone]            Thank You  Sanam BANEGAS

## 2023-11-27 NOTE — TELEPHONE ENCOUNTER
Pt provided with 's new office number and also informed her if she changes her mind to give the office here a call back. Pt verbalized understanding.

## 2023-12-05 ENCOUNTER — TELEPHONE (OUTPATIENT)
Dept: INTERNAL MEDICINE | Facility: CLINIC | Age: 63
End: 2023-12-05

## 2023-12-11 ENCOUNTER — OFFICE VISIT (OUTPATIENT)
Dept: HEMATOLOGY/ONCOLOGY | Facility: CLINIC | Age: 63
End: 2023-12-11
Payer: MEDICARE

## 2023-12-11 DIAGNOSIS — Z80.3 FAMILY HISTORY OF BREAST CANCER: Primary | ICD-10-CM

## 2023-12-11 DIAGNOSIS — Z86.000 HISTORY OF DUCTAL CARCINOMA IN SITU (DCIS) OF BREAST: ICD-10-CM

## 2023-12-11 PROCEDURE — 99443 PR PHYSICIAN TELEPHONE EVALUATION 21-30 MIN: CPT | Mod: 95,,, | Performed by: NURSE PRACTITIONER

## 2023-12-11 PROCEDURE — 99443 PR PHYSICIAN TELEPHONE EVALUATION 21-30 MIN: ICD-10-PCS | Mod: 95,,, | Performed by: NURSE PRACTITIONER

## 2023-12-11 NOTE — PROGRESS NOTES
REFERRING PROVIDER:       Patient ID: Mary Lou Perez is a 62 y.o. female.    Chief Complaint: Personal history of breast cancer (DCIS) dx39y, recent thymoma, and a family history of cancer. Patient presented via Telemedicine Visit (audio only) today for risk assessment, genetic counseling, and consideration for genetic testing.    HPI  Past Medical History:   Diagnosis Date    Asthma     Fibromyalgia     Graves' disease     Hypertension     Hypothyroid     Insomnia     Mediastinal mass     Postoperative hypothyroidism     Rheumatoid arthritis     Seropositive rheumatoid arthritis     Thymoma         Past Surgical History:   Procedure Laterality Date    BILATERAL TUBAL LIGATION      BREAST BIOPSY      CARDIAC CATHETERIZATION      DILATION AND CURETTAGE OF UTERUS      ESOPHAGOGASTRODUODENOSCOPY      MEDIASTINAL MASS EXCISION      MOUTH SURGERY      had bottom teeth pulled    MULTIPLE TOOTH EXTRACTIONS      All teeth now removed    THYROIDECTOMY, PARTIAL          Review of patient's allergies indicates:  Patient has no known allergies.     Review of Systems        Problem List Items Addressed This Visit    None       Oncology History    No history exists.      Family History   Problem Relation Age of Onset    Esophageal cancer Father     Hypertension Mother     Diabetes Mother     Breast cancer Mother     Diabetes Daughter     Cervical cancer Daughter             Assessment:   Risk Assessment:  This patient is at increased risk of having an inherited genetic mutation that increases the risk for cancer. Patient meets criteria for genetic testing based on the National Comprehensive Cancer Network (NCCN) criteria due to a personal history of breast cancer diagnosed under 45y (dx39y) and a family history that includes breast cancer diagnosed under 50y (mother bilateral dx45y) (see family history and pedigree). Based on the likelihood of having a mutation, BRCA1/2 Analysis with [employer] was described in detail.    Education and Counseling:  [facility] CancerNext-Expanded evaluates a broad number of hereditary cancer syndromes to help define patients' cancer risk. This cancer panel tests (77 total): AIP, ALK, APC*, ANNETTE*, AXIN2, BAP1, BARD1, BLM, BMPR1A, BRCA1*, BRCA2*, BRIP1*, CDC73, CDH1*, CDK4, CDKN1B, CDKN2A, CHEK2*, CTNNA1, DICER1, FANCC, FH, FLCN, GALNT12, KIF1B, LZTR1, MAX, MEN1, MET, MLH1*, MSH2*, MSH3, MSH6*, MUTYH*, NBN, NF1*, NF2, NTHL1, PALB2*, PHOX2B, PMS2*, POT1, OWJAV0L, PTCH1, PTEN*, RAD51C*, RAD51D*, RB1, RECQL, RET, SDHA, SDHAF2, SDHB, SDHC, SDHD, SMAD4, SMARCA4, SMARCB1, SMARCE1, STK11, SUFU, EKOO144, TP53*, TSC1, TSC2, VHL and XRCC2 (sequencing and deletion/duplication); EGFR, EGLN1, HOXB13, KIT, MITF, PDGFRA, POLD1 and POLE (sequencing only); EPCAM and GREM1 (deletion/duplication only). DNA and RNA analyses performed for * genes.    Risks of cancer associated with inherited cancer predisposition mutations were discussed in detail.  If a mutation were found, this patient would have a significantly increased risk for cancer.  Inherited cancer syndromes included in this test, may have different, but still significant risk for cancer.  Risk of cancer with any particular gene mutation will be discussed at the time of results disclosure and based on the results.    The availability of clinical management options for inherited cancer predisposition mutation carriers was discussed, including increased surveillance, chemoprevention, and prophylactic surgery. Details of the testing process, including benefits and limitations of genetic analysis as well as the implications of possible test results, were discussed.  Because this patient is the first member of the family to be tested comprehensive panel testing was presented.  Related insurance issues were discussed.      Summary:  This patient was evaluated for hereditary risk of cancer and  was found to be at an increased risk of having an inherited cancer predisposition gene mutation.  The option of genetic testing was explained in detail, including the possible impact of this information on family members.  Since this patient wishes to proceed with testing an informed consent was obtained and blood drawn and sent to [facility].  Results will be expected 4 weeks from this time.  A follow-up appointment will be scheduled for results disclosure.       The patient location is: home.     Visit type: audio only    Time with patient: 40 minutes  >60 minutes of total time spent on the encounter, which includes face to face time and non-face to face time preparing to see the patient (eg, review of tests), Obtaining and/or reviewing separately obtained history, Documenting clinical information in the electronic or other health record, Independently interpreting results (not separately reported) and communicating results to the patient/family/caregiver, or Care coordination (not separately reported).       Each patient to whom he or she provides medical services by telemedicine is:  (1) informed of the relationship between the physician and patient and the respective role of any other health care provider with respect to management of the patient; and (2) notified that he or she may decline to receive medical services by telemedicine and may withdraw from such care at any time.    RADHA ANDERSON, PhD

## 2023-12-27 ENCOUNTER — HOSPITAL ENCOUNTER (OUTPATIENT)
Dept: RADIOLOGY | Facility: HOSPITAL | Age: 63
Discharge: HOME | End: 2023-12-27
Attending: NURSE PRACTITIONER
Payer: MEDICARE

## 2023-12-27 ENCOUNTER — OFFICE VISIT (OUTPATIENT)
Dept: GYNECOLOGY | Facility: CLINIC | Age: 63
End: 2023-12-27
Payer: MEDICARE

## 2023-12-27 VITALS
HEART RATE: 82 BPM | HEIGHT: 62 IN | SYSTOLIC BLOOD PRESSURE: 145 MMHG | WEIGHT: 228.19 LBS | BODY MASS INDEX: 41.99 KG/M2 | OXYGEN SATURATION: 100 % | DIASTOLIC BLOOD PRESSURE: 85 MMHG | TEMPERATURE: 98 F | RESPIRATION RATE: 20 BRPM

## 2023-12-27 DIAGNOSIS — A60.00 RECURRENT GENITAL HSV (HERPES SIMPLEX VIRUS) INFECTION: ICD-10-CM

## 2023-12-27 DIAGNOSIS — N95.0 PMB (POSTMENOPAUSAL BLEEDING): ICD-10-CM

## 2023-12-27 DIAGNOSIS — D25.9 UTERINE LEIOMYOMA, UNSPECIFIED LOCATION: ICD-10-CM

## 2023-12-27 DIAGNOSIS — N95.0 POSTMENOPAUSAL BLEEDING: Primary | ICD-10-CM

## 2023-12-27 LAB
APPEARANCE UR: CLEAR
BACTERIA #/AREA URNS AUTO: ABNORMAL /HPF
BILIRUB UR QL STRIP.AUTO: NEGATIVE
COLOR UR AUTO: ABNORMAL
GLUCOSE UR QL STRIP.AUTO: NORMAL
HYALINE CASTS #/AREA URNS LPF: ABNORMAL /LPF
KETONES UR QL STRIP.AUTO: NEGATIVE
LEUKOCYTE ESTERASE UR QL STRIP.AUTO: NEGATIVE
MUCOUS THREADS URNS QL MICRO: ABNORMAL /LPF
NITRITE UR QL STRIP.AUTO: NEGATIVE
PH UR STRIP.AUTO: 6 [PH]
PROT UR QL STRIP.AUTO: NEGATIVE
RBC #/AREA URNS AUTO: ABNORMAL /HPF
RBC UR QL AUTO: NEGATIVE
SP GR UR STRIP.AUTO: 1.02 (ref 1–1.03)
SQUAMOUS #/AREA URNS LPF: ABNORMAL /HPF
UROBILINOGEN UR STRIP-ACNC: NORMAL
WBC #/AREA URNS AUTO: ABNORMAL /HPF

## 2023-12-27 PROCEDURE — 88305 TISSUE EXAM BY PATHOLOGIST: CPT | Mod: TC

## 2023-12-27 PROCEDURE — 99214 OFFICE O/P EST MOD 30 MIN: CPT | Mod: PBBFAC,25

## 2023-12-27 PROCEDURE — 58100 BIOPSY OF UTERUS LINING: CPT | Mod: PBBFAC | Performed by: OBSTETRICS & GYNECOLOGY

## 2023-12-27 PROCEDURE — 76830 TRANSVAGINAL US NON-OB: CPT | Mod: TC

## 2023-12-27 PROCEDURE — 81001 URINALYSIS AUTO W/SCOPE: CPT

## 2023-12-27 RX ORDER — VALACYCLOVIR HYDROCHLORIDE 500 MG/1
500 TABLET, FILM COATED ORAL NIGHTLY
Qty: 30 TABLET | Refills: 11 | Status: SHIPPED | OUTPATIENT
Start: 2023-12-27 | End: 2024-12-26

## 2023-12-27 NOTE — PROGRESS NOTES
"[facility] OB/GYN CLINIC NOTE  [facility]  [address]  [place] [address]  Phone: [phone]  Fax: [phone]    Subjective:     Mary Lou Perez is a 63 y.o.  who presents complaining of PMB.    Patient last seen by NP 23 for annual exam. History at that time noted: "Denies hx of abnormal pap. Pt is postmenopausal. She had an episode of PMB in 2019 and completed hysteroscopy D&C/polypectomy. She denies any further bleeding." At this visit, patient was counseled that if she had any further bleeding to let us know. She then had spotting with wiping on -11/15 this year and called the clinic.     Today she reports 3 days of moderate bleeding, "it was a flow." She has been sexually active once since April. She reports the same partner for 10+ years and does not want STI screen. She was recently diagnosed with genital HSV this year and has had 5-6 outbreaks in one year. Would like suppression. She agrees to EMB today.   Sound to 7cm  Two passes    MedHx:   Past Medical History:   Diagnosis Date    Asthma     Breast disorder     Fibromyalgia     Graves' disease     Herpes simplex virus (HSV) infection     Hypertension     Hypothyroid     Insomnia     Mediastinal mass     Postoperative hypothyroidism     Rheumatoid arthritis     Seropositive rheumatoid arthritis     Thymoma      SurgHx:   Past Surgical History:   Procedure Laterality Date    BILATERAL TUBAL LIGATION      BREAST BIOPSY      CARDIAC CATHETERIZATION      DILATION AND CURETTAGE OF UTERUS      ESOPHAGOGASTRODUODENOSCOPY      MEDIASTINAL MASS EXCISION      MOUTH SURGERY      had bottom teeth pulled    MULTIPLE TOOTH EXTRACTIONS      All teeth now removed    THYROIDECTOMY, PARTIAL       Medications:     Current Outpatient Medications:     albuterol (PROVENTIL/VENTOLIN HFA) 90 mcg/actuation inhaler, Inhale 2 puffs into the lungs every 6 (six) hours as needed., Disp: , Rfl:     ascorbic acid, vitamin C, (VITAMIN C) 500 MG tablet, Take 500 mg by " mouth once daily., Disp: , Rfl:     Bacillus coagulans 250 million cell Chew, Take by mouth once daily. Takes 2 gummies daily, Disp: , Rfl:     cyanocobalamin, vitamin B-12, (VITAMIN B-12) 2,500 mcg Subl, Place 1 tablet under the tongue Daily., Disp: 90 each, Rfl: 3    fluticasone propionate (FLONASE) 50 mcg/actuation nasal spray, 1 spray by Each Nostril route., Disp: , Rfl:     fluticasone-salmeterol diskus inhaler 500-50 mcg, Inhale 1 puff into the lungs 2 (two) times daily. Controller, Disp: 60 each, Rfl: 2    folic acid (FOLVITE) 1 MG tablet, Take 1 tablet (1,000 mcg total) by mouth once daily., Disp: 90 tablet, Rfl: 3    gabapentin (NEURONTIN) 300 MG capsule, Take 1 capsule (300 mg total) by mouth 3 (three) times daily., Disp: 270 capsule, Rfl: 5    HUMIRA,CF, PEN 40 mg/0.4 mL PnKt, Inject 0.4 mLs (40 mg total) into the skin once a week., Disp: 4 pen, Rfl: 11    hydroxychloroquine (PLAQUENIL) 200 mg tablet, Take 1 tablet (200 mg total) by mouth 2 (two) times daily., Disp: 180 tablet, Rfl: 3    levothyroxine (SYNTHROID) 125 MCG tablet, Take 1 tablet (125 mcg total) by mouth once daily., Disp: 90 tablet, Rfl: 2    linaCLOtide (LINZESS) 145 mcg Cap capsule, Take 1 capsule (145 mcg total) by mouth before breakfast., Disp: 30 capsule, Rfl: 5    magnesium oxide (MAG-OX) 400 mg (241.3 mg magnesium) tablet, Take 1 tablet (400 mg total) by mouth nightly., Disp: 90 tablet, Rfl: 3    meclizine (ANTIVERT) 25 mg tablet, Take 1 tablet (25 mg total) by mouth 3 (three) times daily as needed., Disp: 30 tablet, Rfl: 2    methotrexate 2.5 MG Tab, TAKE 3 TABLETS BY MOUTH AFTER LUNCH AND TAKE 3 TABLETS AFTER SUPPER ON TUESDAY S, Disp: 90 tablet, Rfl: 3    montelukast (SINGULAIR) 10 mg tablet, Take 10 mg by mouth once daily., Disp: , Rfl:     nitroGLYCERIN (NITROSTAT) 0.4 MG SL tablet, DISSOLVE ONE TABLET UNDER THE TONGUE EVERY 5 MINUTES AS NEEDED FOR CHEST PAIN. DO NOT EXCEED A TOTAL OF 3 DOSES IN 15 MINUTES. IF PAIN PERSISTS, SEEK  MEDICAL ATTENTION, Disp: , Rfl:     omeprazole (PRILOSEC) 40 MG capsule, Take 1 capsule (40 mg total) by mouth every morning., Disp: 90 capsule, Rfl: 3    tiZANidine (ZANAFLEX) 4 MG tablet, Take 2 tablets (8 mg total) by mouth nightly., Disp: 180 tablet, Rfl: 3    triamterene-hydrochlorothiazide 37.5-25 mg (MAXZIDE-25) 37.5-25 mg per tablet, Take 1 tablet by mouth once daily., Disp: 90 tablet, Rfl: 2    acyclovir (ZOVIRAX) 800 MG Tab, Take 1 tablet (800 mg total) by mouth 3 (three) times daily. Take within 24-48 hours of an outbreak for 2 days (Patient not taking: Reported on 12/27/2023), Disp: 6 tablet, Rfl: 3    HYDROcodone-acetaminophen (NORCO)  mg per tablet, Take 1 tablet by mouth every 8 (eight) hours as needed for Pain. (Patient not taking: Reported on 12/27/2023), Disp: 90 tablet, Rfl: 0    FM Hx: Denies hx of ovarian, uterine, endometrial, or colon cancer. Denies history of bleeding or coagulation disorders.  Family History   Problem Relation Age of Onset    Hypertension Mother     Diabetes Mother     Breast cancer Mother 45        bilateral    Lung cancer Father     Diabetes Daughter     Cervical cancer Daughter 27    Lung cancer Maternal Uncle      Social Hx: Denies tobacco, alcohol and illicit drug usage.  Social History     Socioeconomic History    Marital status:      Spouse name: Rolando    Number of children: 4   Occupational History    Occupation: disabled   Tobacco Use    Smoking status: Never    Smokeless tobacco: Never   Substance and Sexual Activity    Alcohol use: Not Currently     Comment: ocassionally    Drug use: Never    Sexual activity: Not Currently     Partners: Male     Birth control/protection: None     Social Determinants of Health     Financial Resource Strain: Low Risk  (6/23/2022)    Overall Financial Resource Strain (CARDIA)     Difficulty of Paying Living Expenses: Not very hard   Food Insecurity: No Food Insecurity (6/23/2022)    Hunger Vital Sign     Worried  "About Running Out of Food in the Last Year: Never true     Ran Out of Food in the Last Year: Never true   Transportation Needs: No Transportation Needs (6/23/2022)    PRAPARE - Transportation     Lack of Transportation (Medical): No     Lack of Transportation (Non-Medical): No   Physical Activity: Inactive (6/23/2022)    Exercise Vital Sign     Days of Exercise per Week: 0 days     Minutes of Exercise per Session: 0 min   Stress: No Stress Concern Present (6/23/2022)    [employer] [place] [employer] - Occupational Stress Questionnaire     Feeling of Stress : Not at all   Social Connections: Socially Integrated (6/23/2022)    Social Connection and Isolation Panel [NHANES]     Frequency of Communication with Friends and Family: Three times a week     Frequency of Social Gatherings with Friends and Family: Twice a week     Attends Caodaism Services: More than 4 times per year     Active Member of Clubs or Organizations: Yes     Attends Club or Organization Meetings: 1 to 4 times per year     Marital Status:    Housing Stability: Low Risk  (6/23/2022)    Housing Stability Vital Sign     Unable to Pay for Housing in the Last Year: No     Number of Places Lived in the Last Year: 1     Unstable Housing in the Last Year: No     Review of Systems  Denies fevers, chills, headache, blurry vision, nausea, vomiting, dizziness, or syncope.   Denies chest pain, shortness of breath, RUQ pain, or calf pain.    Objective:     Vitals:    12/27/23 1258   BP: (!) 145/85   BP Location: Left arm   Patient Position: Sitting   BP Method: Medium (Automatic)   Pulse: 82   Resp: 20   Temp: 98.3 °F (36.8 °C)   TempSrc: Oral   SpO2: 100%   Weight: 103.5 kg (228 lb 3.2 oz)   Height: 5' 2" (1.575 m)     Body mass index is 41.74 kg/m².    Physical Exam:     General: alert and oriented, in no acute distress  Lungs: clear to auscultation bilaterally, no conversational dyspnea  Heart: regular rate and rhythm  Abdomen: Soft, " non-distended, non tender to palpation, no involuntary guarding, no rebound tenderness  Extremities: Normal, atraumatic, non-edematous, No cords or calf tenderness, No significant calf/ankle edema  External genitalia: Normal female genitalia without lesion, discharge or tenderness. Normal appearing urethral meatus. Normal appearing external anus.  Bimanual Exam: No pelvic lymphadenopathy noted bilaterally. Vagina with adequate capacity. Uterus 8cm in size, no cervical motion tenderness. Smooth in contour, no masses. Minimal descent, mobile. No adnexal fullness/tenderness. Normal urethra. Normal bladder  Speculum Exam: Vaginal mucosa normal in appearance. Pale pink. No masses/lesions. Cervix well visualized, smooth in contour no masses or lesions. Os normal in appearance, no blood or discharge coming from the os    Note: RN chaperone present for entirety of exam.     Imaging  TVUS 12/27/23:  FINDINGS:  Uterus measures 8.0 x 3.9 x 5.0 cm.  There are multiple hypoechoic fibroids measuring up to 2.6 cm at the fundus.  A 1.3 cm hypoechoic fibroid lies along the left margin of the endometrial cavity.  Endometrium measures 0.3 cm.  Ovaries are not identified.  No adnexal masses or free fluid.  Impression:  1. Multiple uterine fibroids including 1 which has a partial submucosal appearance.     Electronically signed by: Allan Murphy MD  Date:                                            12/27/2023  Time:                                           11:33    Labs:  Lab Results   Component Value Date    WBC 7.1 03/22/2023    HGB 13.2 03/22/2023    HCT 39.4 03/22/2023    MCV 88.9 03/22/2023     03/22/2023 4/21/2023 Pap smear: NILM, HRHPV (-)  7/2023 Cologuard negative    Endometrial Biopsy Procedure Note  Urine pregnancy test not done due to postmenopausal status.  The risks (including infection, bleeding, pain, and uterine perforation) and benefits of the procedure were explained to the patient and Written informed  consent was obtained.     The patient was placed in the dorsal lithotomy position. Bimanual exam revealed 8cm anteverted uterus.  A speculum inserted in the vagina, and the cervix prepped with povidone iodine x 3.    A single tooth tenaculum was applied to the anterior lip of the cervix. The uterus was sounded to 7cm. An endometrial pipelle was used to obtain an endometrial sample with two passes. Adequate appearing tissue was obtained. Sample was sent for pathologic examination.    Condition:  Stable    Complications:  None    Plan:  The patient was advised to call for any fever or for prolonged or severe pain or bleeding. She was advised to use NSAIDs as needed for mild to moderate pain. She was advised to avoid vaginal intercourse for 48 hours or until the bleeding has completely stopped.    Specimen: Endometrial biopsy    Assessment/Plan:    Mary Lou Perez is a 63 y.o.  with recurrent PMB.    Recurrent PMB: EMB performed today. Discussed with patient the need for hysteroscopy D&C. If EMB pathology benign or insufficient, will send to preoperative resident for discussion of surgical sampling.   UA negative for RBCs  Cologuard negative  Valtrex suppression for recurrent genital HSV sent.   Will call patient with results. Return to preoperative clinic for discussion of hysteroscopy D&C.    Future Appointments   Date Time Provider Department Center   2023  1:15 PM [facility]ID[facility], [facility] GYN [facility] GYN [place]    2024  3:50 PM Yolanda Rosales, CNP [facility] HEMOMC [place]    2024 10:30 AM Steffany Rogers FNP [facility] GASTRO [place]    2024  8:50 AM Roxanna Allan FNP [facility] GYN [place]    2024  9:50 AM Laura Chin DO [facility] IM RES [place]     Patient and plan were discussed with Dr. Lares.    Shara Watkins MD  [facility] PGY4

## 2023-12-29 LAB
ESTROGEN SERPL-MCNC: NORMAL PG/ML
INSULIN SERPL-ACNC: NORMAL U[IU]/ML
LAB AP CLINICAL INFORMATION: NORMAL
LAB AP GROSS DESCRIPTION: NORMAL
LAB AP REPORT FOOTNOTES: NORMAL
T3RU NFR SERPL: NORMAL %

## 2023-12-31 DIAGNOSIS — D25.9 UTERINE LEIOMYOMA, UNSPECIFIED LOCATION: Primary | ICD-10-CM

## 2024-01-20 ENCOUNTER — TELEPHONE (OUTPATIENT)
Dept: GYNECOLOGY | Facility: CLINIC | Age: 64
End: 2024-01-20
Payer: MEDICARE

## 2024-01-20 DIAGNOSIS — N95.0 POSTMENOPAUSAL BLEEDING: Primary | ICD-10-CM

## 2024-01-20 NOTE — TELEPHONE ENCOUNTER
Called Ms Mary Lou Jacob Chris to discuss [place] [facility] [place] endometrial sampling.  Patient agreeable to date of 3/26 for surgery.  Preop appt requested, case request placed.    Alexandr Solares MD  [facility]  PGY-3

## 2024-01-21 ENCOUNTER — TELEPHONE (OUTPATIENT)
Dept: GYNECOLOGY | Facility: CLINIC | Age: 64
End: 2024-01-21
Payer: MEDICARE

## 2024-01-22 NOTE — TELEPHONE ENCOUNTER
----- Message from Alexandr Solares MD sent at 1/20/2024  3:50 PM CST -----  Regarding: Preop Appt  Hi Ms Macario,    This patient is scheduled for a [facility] D[place]C on 3/26. Could you schedule her preop appt for 3/20?    Thank you    Alexandr Solares MD  [facility]  PGY-3

## 2024-01-29 ENCOUNTER — OFFICE VISIT (OUTPATIENT)
Dept: HEMATOLOGY/ONCOLOGY | Facility: CLINIC | Age: 64
End: 2024-01-29
Payer: MEDICARE

## 2024-01-29 DIAGNOSIS — Z86.000 HISTORY OF DUCTAL CARCINOMA IN SITU (DCIS) OF BREAST: Primary | ICD-10-CM

## 2024-01-29 DIAGNOSIS — Z80.3 FAMILY HISTORY OF BREAST CANCER: ICD-10-CM

## 2024-01-29 PROCEDURE — 1159F MED LIST DOCD IN RCRD: CPT | Mod: CPTII,95,, | Performed by: NURSE PRACTITIONER

## 2024-01-29 PROCEDURE — 99441 PR PHYSICIAN TELEPHONE EVALUATION 5-10 MIN: CPT | Mod: 95,,, | Performed by: NURSE PRACTITIONER

## 2024-01-29 NOTE — PROGRESS NOTES
REFERRING PROVIDER: Roxanna Allan NP    Subjective:       Patient ID: Mary Lou Perez is a 63 y.o. female.    Chief Complaint: Personal history of breast cancer (DCIS) dx39y, recent thymoma, and a family history of cancer. Patient presented for genetic counseling on 12/11/2023 and was found appropriate for genetic testing based on the National Comprehensive Cancer Network (NCCN) criteria due to a personal history of breast cancer diagnosed under 45y (dx39y) and a family history that includes breast cancer diagnosed under 50y (mother bilateral dx45y) (see family history and pedigree). Patient signed consent, lab was drawn and sent to [email or web address] for CancerNext-Expanded+RNAinsight panel testing. Patient presented via Telemedicine Visit (audio only) today for results disclosure.     HPI  Past Medical History:   Diagnosis Date    Asthma     Breast disorder     Fibromyalgia     Graves' disease     Herpes simplex virus (HSV) infection     Hypertension     Hypothyroid     Insomnia     Mediastinal mass     Postoperative hypothyroidism     Rheumatoid arthritis     Seropositive rheumatoid arthritis     Thymoma         Past Surgical History:   Procedure Laterality Date    BILATERAL TUBAL LIGATION      BREAST BIOPSY      CARDIAC CATHETERIZATION      DILATION AND CURETTAGE OF UTERUS      ESOPHAGOGASTRODUODENOSCOPY      MEDIASTINAL MASS EXCISION      MOUTH SURGERY      had bottom teeth pulled    MULTIPLE TOOTH EXTRACTIONS      All teeth now removed    THYROIDECTOMY, PARTIAL          Review of patient's allergies indicates:  No Known Allergies     Review of Systems            Problem List Items Addressed This Visit    None    Oncology History    No history exists.            Family History   Problem Relation Age of Onset    Hypertension Mother     Diabetes Mother     Breast cancer Mother 45        bilateral    Lung cancer Father     Diabetes Daughter     Cervical cancer Daughter 27    Lung cancer Maternal Uncle          Assessment:   Genetic testing was appropriate for this patient because of personal and family history. This comprehensive [facility] Genetics CancerNext-Expanded analysis indicated that there was no deleterious mutation and no variants of uncertain significance found in (77 total): AIP, ALK, APC*, ANNETTE*, AXIN2, BAP1, BARD1, BLM, BMPR1A, BRCA1*, BRCA2*, BRIP1*, CDC73, CDH1*, CDK4, CDKN1B, CDKN2A, CHEK2*, CTNNA1, DICER1, FANCC, FH, FLCN, GALNT12, KIF1B, LZTR1, MAX, MEN1, MET, MLH1*, MSH2*, MSH3, MSH6*, MUTYH*, NBN, NF1*, NF2, NTHL1, PALB2*, PHOX2B, PMS2*, POT1, TVMQO0D, PTCH1, PTEN*, RAD51C*, RAD51D*, RB1, RECQL, RET, SDHA, SDHAF2, SDHB, SDHC, SDHD, SMAD4, SMARCA4, SMARCB1, SMARCE1, STK11, SUFU, SLOH955, TP53*, TSC1, TSC2, VHL and XRCC2 (sequencing and deletion/duplication); EGFR, EGLN1, HOXB13, KIT, MITF, PDGFRA, POLD1 and POLE (sequencing only); EPCAM and GREM1 (deletion/duplication only). DNA and RNA analyses performed for * genes.    It was explained to the patient, that, although this analysis indicated a negative result, there are other, rare genetic abnormalities that this test will not detect. This result, however, rules out the majority of abnormalities believed to be responsible for hereditary susceptibility to cancer.    A copy of the result will be emailed to the patient: [email or web address]     The patient location is: home    Visit type: audio only    Time with patient: 10 minutes  20 minutes of total time spent on the encounter, which includes face to face time and non-face to face time preparing to see the patient (eg, review of tests), Obtaining and/or reviewing separately obtained history, Documenting clinical information in the electronic or other health record, Independently interpreting results (not separately reported) and communicating results to the patient/family/caregiver, or Care coordination (not separately reported).         Each patient to whom he or she provides medical services by  telemedicine is:  (1) informed of the relationship between the physician and patient and the respective role of any other health care provider with respect to management of the patient; and (2) notified that he or she may decline to receive medical services by telemedicine and may withdraw from such care at any time.    RADHA ANDERSON, PhD

## 2024-03-12 ENCOUNTER — TELEPHONE (OUTPATIENT)
Dept: INTERNAL MEDICINE | Facility: CLINIC | Age: 64
End: 2024-03-12

## 2024-03-12 ENCOUNTER — TELEPHONE (OUTPATIENT)
Dept: GYNECOLOGY | Facility: CLINIC | Age: 64
End: 2024-03-12
Payer: MEDICARE

## 2024-03-12 NOTE — TELEPHONE ENCOUNTER
I moved it to 3/20/24.          ----- Message from Jarad Jones sent at 3/12/2024 10:19 AM CDT -----  Regarding: Call Back  The patient above called because she is sick and is want to reschedule her appt for tomorrow to sometime next week. Please advise, Thanks.

## 2024-03-12 NOTE — TELEPHONE ENCOUNTER
VOICE MAIL MESSAGE:    DR. FOSTER,          MS. GARDINER CALLED STATING THAT SHE HAS A COUGH ANS WOULD LIKE SOMETHING SENT TO HER PHARMACY.  COUGHING SINCE SUNDAY.  OTC ROBITUSSIN DM HAS NOT HELPED. SHE HAD VERTIGO LAST WEEK.  SHE THINKS IT IS A HEAD COLD.  SHE HAS RA AND OTHER ILLNESSES AND SHE IS NOT FEELING HERSELF.  PLEASE ADVISE.

## 2024-03-20 NOTE — TELEPHONE ENCOUNTER
TELEPHONE MESSAGE:      DR. FOSTER,     MS. GARDINER HAS A GYN PROCEDURE SCHEDULED FOR  TUESDAY MARCH 26TH THAT MAY HAVE TO BE CANCELLED DUE TO A COUGH THAT SHE HAS BEEN HAVING FOR THE PAST WEEK OR SO.  WE SENT A MESSAGE FOR YOU TO ADDRESS, BUT NO RESPONSE.  PLEASE ADVISE.

## 2024-03-25 NOTE — PROGRESS NOTES
[facility] Gy[facility] Preoperative Visit History and Physical    HPI:   63 y.o.  with a history of multiple episodes of PMB.  She had prior episode of PMB in 2019 for which she underwent [facility] polypectomy. She then represents with 2 subsequent episodes of vaginal bleeding in 2023 and 2023.   EMB was collected in 2023 resulted with insufficient tissue for diagnosis.    She presents with a cough today that has been present for the last 2 weeks. She reports some yellow mucous. Reports has been using albuterol which helped but forgot it this morning. She denies any fever, chills nausea or vomiting. Reports previous symptoms of fever but reports has improved and continues to have a lingering cough. She states she would like to wait until her cough is resolved prior to proceeding with [facility].    GynHx:  Contraception: s/p BTL  Menopause: 55  Denies STIs/abnormal paps     ObHx:  OB History    Para Term  AB Living   4 4 4     1   SAB IAB Ectopic Multiple Live Births           1      # Outcome Date GA Lbr Nicholas/2nd Weight Sex Delivery Anes PTL Lv   4 Term      Vag-Spont      3 Term      Vag-Spont      2 Term      Vag-Spont      1 Term      Vag-Spont   SLOAN     BB [id number]    PMH:  Past Medical History:   Diagnosis Date    Asthma     Breast disorder     Fibromyalgia     Graves' disease     Herpes simplex virus (HSV) infection     Hypertension     Hypothyroid     Insomnia     Mediastinal mass     Postoperative hypothyroidism     Rheumatoid arthritis     Seropositive rheumatoid arthritis     Thymoma        SurgHx:  Past Surgical History:   Procedure Laterality Date    BILATERAL TUBAL LIGATION      BREAST BIOPSY      CARDIAC CATHETERIZATION      DILATION AND CURETTAGE OF UTERUS      ESOPHAGOGASTRODUODENOSCOPY      MEDIASTINAL MASS EXCISION      MOUTH SURGERY      had bottom teeth pulled    MULTIPLE TOOTH EXTRACTIONS      All teeth now removed    THYROIDECTOMY, PARTIAL       FamHx:  Family History   Problem Relation  Age of Onset    Hypertension Mother     Diabetes Mother     Breast cancer Mother 45        bilateral    Lung cancer Father     Diabetes Daughter     Cervical cancer Daughter 27    Lung cancer Maternal Uncle      Denies history of ovarian, endometrial, colon cancers.    SocialHx:  Social History     Socioeconomic History    Marital status:      Spouse name: Rolando    Number of children: 4   Occupational History    Occupation: disabled   Tobacco Use    Smoking status: Never    Smokeless tobacco: Never   Substance and Sexual Activity    Alcohol use: Not Currently     Comment: ocassionally    Drug use: Never    Sexual activity: Not Currently     Partners: Male     Birth control/protection: None     Social Determinants of Health     Financial Resource Strain: Low Risk  (6/23/2022)    Overall Financial Resource Strain (CARDIA)     Difficulty of Paying Living Expenses: Not very hard   Food Insecurity: No Food Insecurity (6/23/2022)    Hunger Vital Sign     Worried About Running Out of Food in the Last Year: Never true     Ran Out of Food in the Last Year: Never true   Transportation Needs: No Transportation Needs (6/23/2022)    PRAPARE - Transportation     Lack of Transportation (Medical): No     Lack of Transportation (Non-Medical): No   Physical Activity: Inactive (6/23/2022)    Exercise Vital Sign     Days of Exercise per Week: 0 days     Minutes of Exercise per Session: 0 min   Stress: No Stress Concern Present (6/23/2022)    [employer] - Occupational Stress Questionnaire     Feeling of Stress : Not at all   Social Connections: Socially Integrated (6/23/2022)    Social Connection and Isolation Panel [NHANES]     Frequency of Communication with Friends and Family: Three times a week     Frequency of Social Gatherings with Friends and Family: Twice a week     Attends Congregation Services: More than 4 times per year     Active Member of Clubs or Organizations: Yes     Attends Club or  Organization Meetings: 1 to 4 times per year     Marital Status:    Housing Stability: Low Risk  (6/23/2022)    Housing Stability Vital Sign     Unable to Pay for Housing in the Last Year: No     Number of Places Lived in the Last Year: 1     Unstable Housing in the Last Year: No   Rare alcohol use  Denies tobacco/illicit drug use.    All:  No Known Allergies    Meds:  Prior to Admission medications    Medication Sig Start Date End Date Taking? Authorizing Provider   acyclovir (ZOVIRAX) 800 MG Tab Take 1 tablet (800 mg total) by mouth 3 (three) times daily. Take within 24-48 hours of an outbreak for 2 days  Patient not taking: Reported on 12/27/2023 8/23/23 8/25/23  Roxanna Allan, GENEVA   albuterol (PROVENTIL/VENTOLIN HFA) 90 mcg/actuation inhaler Inhale 2 puffs into the lungs every 6 (six) hours as needed.    Provider, Historical   ascorbic acid, vitamin C, (VITAMIN C) 500 MG tablet Take 500 mg by mouth once daily.    Provider, Historical   Bacillus coagulans 250 million cell Chew Take by mouth once daily. Takes 2 gummies daily    Provider, Historical   cyanocobalamin, vitamin B-12, (VITAMIN B-12) 2,500 mcg Subl Place 1 tablet under the tongue Daily. 3/22/23   Milo Sierra MD   fluticasone propionate (FLONASE) 50 mcg/actuation nasal spray 1 spray by Each Nostril route. 8/31/23   Provider, Historical   fluticasone-salmeterol diskus inhaler 500-50 mcg Inhale 1 puff into the lungs 2 (two) times daily. Controller 11/21/22   Laura Chin,    folic acid (FOLVITE) 1 MG tablet Take 1 tablet (1,000 mcg total) by mouth once daily. 9/15/23   Milo Sierra MD   gabapentin (NEURONTIN) 300 MG capsule Take 1 capsule (300 mg total) by mouth 3 (three) times daily. 9/15/23   Milo Sierra MD   HUMIRA,CF, PEN 40 mg/0.4 mL PnKt Inject 0.4 mLs (40 mg total) into the skin once a week. 3/22/23   Milo Sierra MD   HYDROcodone-acetaminophen (NORCO)  mg per tablet Take 1 tablet by mouth every 8  (eight) hours as needed for Pain.  Patient not taking: Reported on 12/27/2023 11/7/23 12/7/23  Milo Sierra MD   hydroxychloroquine (PLAQUENIL) 200 mg tablet Take 1 tablet (200 mg total) by mouth 2 (two) times daily. 9/15/23   Milo Sierra MD   levothyroxine (SYNTHROID) 125 MCG tablet Take 1 tablet (125 mcg total) by mouth once daily. 7/18/23   Laura Chin DO   linaCLOtide (LINZESS) 145 mcg Cap capsule Take 1 capsule (145 mcg total) by mouth before breakfast.  Patient not taking: Reported on 2/5/2024 7/27/23   Steffany Rogers, GENEVA   magnesium oxide (MAG-OX) 400 mg (241.3 mg magnesium) tablet Take 1 tablet (400 mg total) by mouth nightly.  Patient not taking: Reported on 2/5/2024 9/15/23   Milo Sierra MD   meclizine (ANTIVERT) 25 mg tablet Take 1 tablet (25 mg total) by mouth 3 (three) times daily as needed.  Patient not taking: Reported on 2/5/2024 12/7/22   Laura Chin DO   methotrexate 2.5 MG Tab TAKE 3 TABLETS BY MOUTH AFTER LUNCH AND TAKE 3 TABLETS AFTER SUPPER ON TUESDAY S 9/15/23   Milo Sierra MD   montelukast (SINGULAIR) 10 mg tablet Take 10 mg by mouth once daily.    Provider, Historical   nitroGLYCERIN (NITROSTAT) 0.4 MG SL tablet DISSOLVE ONE TABLET UNDER THE TONGUE EVERY 5 MINUTES AS NEEDED FOR CHEST PAIN. DO NOT EXCEED A TOTAL OF 3 DOSES IN 15 MINUTES. IF PAIN PERSISTS, SEEK MEDICAL ATTENTION 1/31/22   Provider, Historical   omeprazole (PRILOSEC) 40 MG capsule Take 1 capsule (40 mg total) by mouth every morning. 9/15/23   Milo Sierra MD   tiZANidine (ZANAFLEX) 4 MG tablet Take 2 tablets (8 mg total) by mouth nightly. 9/15/23 9/9/24  Milo Sierra MD   triamterene-hydrochlorothiazide 37.5-25 mg (MAXZIDE-25) 37.5-25 mg per tablet Take 1 tablet by mouth once daily. 8/10/23   Laura Chin DO   valACYclovir (VALTREX) 500 MG tablet Take 1 tablet (500 mg total) by mouth every evening. 12/27/23 12/26/24  Shara Watkins MD             Review of Systems: As  stated in HPI.      Objective:     There were no vitals filed for this visit.  There is no height or weight on file to calculate BMI.    PHYSICAL EXAM  GEN: NAD, A&O x3  CV: RRR   LUNGS: CTABL  ABDOMEN: soft, NTND, no masses  EXTREMITIES: atraumatic, no edema     LABS:  Last mammogram: 2022: Right breast: Bi-RADS 2; Left Breast: Bi-RADS 1  Last pap: 2023: NILM/HPV(-)  EMB 2023:  Endometrial biopsy:  - Scant thin strips of endometrial epithelium and minimal stroma with predominantly mucus.  - Insufficient tissue for further diagnostic evaluation.   Colonoscopy: cologuard in 2023 negative    IMAGING:  TVUS 2023:     FINDINGS:  Uterus measures 8.0 x 3.9 x 5.0 cm.  There are multiple hypoechoic fibroids measuring up to 2.6 cm at the fundus.  A 1.3 cm hypoechoic fibroid lies along the left margin of the endometrial cavity.  Endometrium measures 0.3 cm.     Ovaries are not identified.  No adnexal masses or free fluid.     Impression:     1. Multiple uterine fibroids including 1 which has a partial submucosal appearance.    Assessment:      63 y.o.  with PMB for surgical management.  1. PMB (postmenopausal bleeding)  Urinalysis, Reflex to Urine Culture    Urine culture      2. Cough, unspecified type          Plan:     Cough  Patient with lingering cough  Lungs are clear.  Afebrile  Discussed follow up with PCP prior to continuing with Hysteroscopy D&C     PMB (postmenopausal bleeding)  Patient with recurrence of PMB s/p hysteroscopy D&C a polypectomy.  EMB with insufficient tissue, recommend HSC D&C for further evaluation for PMB to evaluate for any malignancy.    Will reschedule patient after resolution of cough    Discussed with Dr. Lares.    Aleshia Gonzales MD PGY3  Obstetrics & Gynecology

## 2024-03-27 ENCOUNTER — OFFICE VISIT (OUTPATIENT)
Dept: GYNECOLOGY | Facility: CLINIC | Age: 64
End: 2024-03-27
Payer: MEDICARE

## 2024-03-27 DIAGNOSIS — N95.0 PMB (POSTMENOPAUSAL BLEEDING): Primary | ICD-10-CM

## 2024-03-27 DIAGNOSIS — R05.9 COUGH, UNSPECIFIED TYPE: ICD-10-CM

## 2024-03-27 LAB
APPEARANCE UR: CLEAR
BACTERIA #/AREA URNS AUTO: ABNORMAL /HPF
BILIRUB UR QL STRIP.AUTO: NEGATIVE
COLOR UR AUTO: ABNORMAL
GLUCOSE UR QL STRIP.AUTO: NORMAL
HYALINE CASTS #/AREA URNS LPF: ABNORMAL /LPF
KETONES UR QL STRIP.AUTO: NEGATIVE
LEUKOCYTE ESTERASE UR QL STRIP.AUTO: 250
MUCOUS THREADS URNS QL MICRO: ABNORMAL /LPF
NITRITE UR QL STRIP.AUTO: NEGATIVE
PH UR STRIP.AUTO: 6 [PH]
PROT UR QL STRIP.AUTO: NEGATIVE
RBC #/AREA URNS AUTO: ABNORMAL /HPF
RBC UR QL AUTO: ABNORMAL
SP GR UR STRIP.AUTO: 1.02 (ref 1–1.03)
SQUAMOUS #/AREA URNS LPF: ABNORMAL /HPF
UROBILINOGEN UR STRIP-ACNC: NORMAL
WBC #/AREA URNS AUTO: ABNORMAL /HPF

## 2024-03-27 PROCEDURE — 81001 URINALYSIS AUTO W/SCOPE: CPT

## 2024-03-27 PROCEDURE — 87086 URINE CULTURE/COLONY COUNT: CPT

## 2024-03-28 NOTE — ASSESSMENT & PLAN NOTE
Patient with lingering cough  Lungs are clear.  Afebrile  Discussed follow up with PCP prior to continuing with Hysteroscopy D&C

## 2024-03-28 NOTE — ASSESSMENT & PLAN NOTE
Patient with recurrence of PMB s/p hysteroscopy D&C a polypectomy.  EMB with insufficient tissue, recommend HSC D&C for further evaluation for PMB to evaluate for any malignancy.

## 2024-03-30 LAB — BACTERIA UR CULT: NORMAL

## 2024-04-02 NOTE — PROGRESS NOTES
Patient's urine culture showed no growth even though that was 250 leuk esterase on the UA.  Does not need any antibiotics at this time.

## 2024-04-03 ENCOUNTER — TELEPHONE (OUTPATIENT)
Dept: INTERNAL MEDICINE | Facility: CLINIC | Age: 64
End: 2024-04-03
Payer: MEDICARE

## 2024-04-03 NOTE — TELEPHONE ENCOUNTER
Spoke with patient and informed of urine culture and no need for antibiotics at this time per Dr Chin, patient verbalized understanding and pleased.

## 2024-05-03 ENCOUNTER — TELEPHONE (OUTPATIENT)
Dept: GYNECOLOGY | Facility: CLINIC | Age: 64
End: 2024-05-03
Payer: MEDICARE

## 2024-05-03 DIAGNOSIS — N95.0 PMB (POSTMENOPAUSAL BLEEDING): Primary | ICD-10-CM

## 2024-05-03 NOTE — TELEPHONE ENCOUNTER
Called patient to offer surgery date for hysteroscopy D&C now that she has recovered from pneumonia and is feeling better. Patient desires 5/28; case request submitted. Pre-op appointment to be scheduled.    Radha Sotelo MD  [employer] [facility], PGY-3

## 2024-05-13 ENCOUNTER — ANESTHESIA EVENT (OUTPATIENT)
Dept: SURGERY | Facility: HOSPITAL | Age: 64
End: 2024-05-13
Payer: MEDICARE

## 2024-05-13 NOTE — ANESTHESIA PREPROCEDURE EVALUATION
Mary Lou Perez is a 63 y.o. female presenting for HYSTEROSCOPY, WITH DILATION AND CURETTAGE OF UTERUS (Abdomen) with a history of  -PMB/fibroids      Vitals:    05/28/24 0710 05/28/24 0716 05/28/24 0837   BP: (!) 147/92  (!) 140/72   Pulse: 91  88   Resp: 18  20   Temp: 36.7 °C (98.1 °F)  36 °C (96.8 °F)   TempSrc: Oral  Temporal   SpO2: 99%  100%   Weight:  106.1 kg (234 lb)          -BREAST CANCER @ 38YO  -THYMOMA  -POST-OP HYPOTHYROIDISM/H/O GRAVE'S DZ    -TSH 3.051 3/22/23  -HSV 2  -ASTHMA (COUGHING 3/12/24)-- reports treatment for CAP by PCP  -HTN  -Rheumatoid arthritis on Humira  -LBP  -INSOMNIA  -GERD  -FIBROMYALGIA  -SMALL STABLE LUNG NODULES (CT 8/2023)  -EVALUATED IN CARDIOLOGY IN 2020 FOR CP/SOB. HAS NOT FOLLOWED UP SINCE 2020 AFTER UNREMARKABLE TESTING    BETA-BLOCKER: NONE     New Orders for Anesthesia: NONE  Surgery cancelled 3/27/24 due to cough/reported PNA    Patient Active Problem List   Diagnosis    Caries    Fibromyalgia    Insomnia    Seropositive rheumatoid arthritis    GERD (gastroesophageal reflux disease)    Screening for colon cancer    Chronic constipation    Arthritis    Thymoma    H. pylori infection    PMB (postmenopausal bleeding)    Cough       Past Surgical History:   Procedure Laterality Date    BILATERAL TUBAL LIGATION      BREAST BIOPSY      CARDIAC CATHETERIZATION      DILATION AND CURETTAGE OF UTERUS      ESOPHAGOGASTRODUODENOSCOPY      MEDIASTINAL MASS EXCISION      MOUTH SURGERY      had bottom teeth pulled    MULTIPLE TOOTH EXTRACTIONS      All teeth now removed    THYROIDECTOMY, PARTIAL         Lab Results   Component Value Date    WBC 7.1 03/22/2023    HGB 13.2 03/22/2023    HCT 39.4 03/22/2023     03/22/2023       CMP  Sodium   Date Value Ref Range Status   03/22/2023 142 136 - 145 mmol/L Final     Potassium   Date Value Ref Range Status   03/22/2023 4.4 3.5 - 5.1 mmol/L Final     CO2   Date Value Ref Range Status   03/22/2023 30 23 - 31 mmol/L Final      Blood Urea Nitrogen   Date Value Ref Range Status   03/22/2023 12.1 9.8 - 20.1 mg/dL Final     Creatinine   Date Value Ref Range Status   03/22/2023 0.90 0.55 - 1.02 mg/dL Final     Calcium   Date Value Ref Range Status   03/22/2023 10.0 8.4 - 10.2 mg/dL Final     Albumin   Date Value Ref Range Status   03/22/2023 3.8 3.4 - 4.8 g/dL Final     Bilirubin Total   Date Value Ref Range Status   03/22/2023 0.8 <=1.5 mg/dL Final     ALP   Date Value Ref Range Status   03/22/2023 92 40 - 150 unit/L Final     AST   Date Value Ref Range Status   03/22/2023 21 5 - 34 unit/L Final     ALT   Date Value Ref Range Status   03/22/2023 13 0 - 55 unit/L Final     eGFR   Date Value Ref Range Status   03/22/2023 >60 mls/min/1.73/m2 Final       ECHO 8/7/2020:      NUCLEAR STRESS TEST 6/2/2020    CARDIAC PROGRESS NOTE,  [facility] CARDS, 9/30/2020    Past anesthesia records:         Lab Results   Component Value Date    WBC 7.05 05/23/2024    HGB 13.3 05/23/2024    HCT 39.1 05/23/2024     05/23/2024    CHOL 146 04/07/2022    TRIG 114 04/07/2022    HDL 53 04/07/2022    ALT 11 05/23/2024    AST 17 05/23/2024     05/23/2024    K 4.3 05/23/2024    CREATININE 0.85 05/23/2024    BUN 17.8 05/23/2024    CO2 28 05/23/2024    TSH 2.683 05/23/2024    HGBA1C 4.8 04/07/2022       Pre-op Assessment    I have reviewed the Patient Summary Reports.     I have reviewed the Nursing Notes. I have reviewed the NPO Status.   I have reviewed the Medications.     Review of Systems  Anesthesia Hx:  No problems with previous Anesthesia   History of prior surgery of interest to airway management or planning:          Denies Family Hx of Anesthesia complications.    Denies Personal Hx of Anesthesia complications.                    Hematology/Oncology:  Hematology Normal   Oncology Normal                                   EENT/Dental:  EENT/Dental Normal           Cardiovascular:  Cardiovascular Normal                                             Pulmonary:  Pulmonary Normal                       Renal/:  Renal/ Normal                 Hepatic/GI:  Hepatic/GI Normal                 Musculoskeletal:  Musculoskeletal Normal                Neurological:  Neurology Normal                                      Endocrine:  Endocrine Normal            Dermatological:  Skin Normal    Psych:  Psychiatric Normal                    Physical Exam  General: Well nourished, Cooperative, Alert and Oriented    Airway:  Mallampati: I / I  Mouth Opening: Normal  TM Distance: Normal    Dental:  Dentures        Anesthesia Plan  Type of Anesthesia, risks & benefits discussed:    Anesthesia Type: Gen ETT  Intra-op Monitoring Plan: Standard ASA Monitors  Post Op Pain Control Plan: multimodal analgesia and IV/PO Opioids PRN  Induction:  IV  Airway Plan: Direct  Informed Consent: Informed consent signed with the Patient and all parties understand the risks and agree with anesthesia plan.  All questions answered. Patient consented to blood products? No  ASA Score: 3  Day of Surgery Review of History & Physical: H&P Update referred to the surgeon/provider.H&P completed by Anesthesiologist.    Ready For Surgery From Anesthesia Perspective.     .

## 2024-05-19 ENCOUNTER — TELEPHONE (OUTPATIENT)
Dept: GYNECOLOGY | Facility: CLINIC | Age: 64
End: 2024-05-19
Payer: MEDICARE

## 2024-05-20 ENCOUNTER — TELEPHONE (OUTPATIENT)
Dept: GYNECOLOGY | Facility: CLINIC | Age: 64
End: 2024-05-20
Payer: MEDICARE

## 2024-05-20 NOTE — OR NURSING
"PACE/PRE-OP. Pre op assessment complete. Patient advised she needs cardiac clearance before her scheduled procedure with MD Lares on 5/28/24. She verbalizes understanding and states, I have not seen a cardiologist in over 2 years. I don't have one assigned to me but I will talk to my primary care doctor, doctor Tracy about getting in to see a cardiologist" pt states she has an appointment to see MD Tracy on 5/23/24. Ms Perez was advised to call the PACE clinic once she has an appointment set up with a cardiologist. Phone number and contact persons in PACE clinic provided. Patient verbalizes understanding.     Rupinder Watts RN    "

## 2024-05-20 NOTE — TELEPHONE ENCOUNTER
Good Morning,  GYN is need of a cardiac clearance before her HSC (hysteroscopy D&C) on 5/28/24.   Would your clinic be able to provide this?  Thank You,  Amirah

## 2024-05-20 NOTE — TELEPHONE ENCOUNTER
----- Message from Radha Sotelo MD sent at 5/3/2024  1:54 PM CDT -----  Please obtain cardiac clearance due to patient's h/o cardiac catheterization. Thank you!

## 2024-05-20 NOTE — TELEPHONE ENCOUNTER
Good Morning,  GYN is in need of a cardiac clearance before her HSC (hysteroscopy D&C) on 5/28/24.  H/O cardiac catheterization.  Thank You,  Amirah

## 2024-05-23 ENCOUNTER — OFFICE VISIT (OUTPATIENT)
Dept: INTERNAL MEDICINE | Facility: CLINIC | Age: 64
End: 2024-05-23
Payer: MEDICARE

## 2024-05-23 ENCOUNTER — TELEPHONE (OUTPATIENT)
Dept: INTERNAL MEDICINE | Facility: CLINIC | Age: 64
End: 2024-05-23

## 2024-05-23 ENCOUNTER — LAB VISIT (OUTPATIENT)
Dept: LAB | Facility: HOSPITAL | Age: 64
End: 2024-05-23
Attending: STUDENT IN AN ORGANIZED HEALTH CARE EDUCATION/TRAINING PROGRAM
Payer: MEDICARE

## 2024-05-23 VITALS
HEIGHT: 62 IN | BODY MASS INDEX: 43.54 KG/M2 | DIASTOLIC BLOOD PRESSURE: 86 MMHG | OXYGEN SATURATION: 96 % | HEART RATE: 85 BPM | TEMPERATURE: 98 F | WEIGHT: 236.63 LBS | RESPIRATION RATE: 18 BRPM | SYSTOLIC BLOOD PRESSURE: 138 MMHG

## 2024-05-23 DIAGNOSIS — M06.9 RHEUMATOID ARTHRITIS, INVOLVING UNSPECIFIED SITE, UNSPECIFIED WHETHER RHEUMATOID FACTOR PRESENT: ICD-10-CM

## 2024-05-23 DIAGNOSIS — M06.9 RHEUMATOID ARTHRITIS, INVOLVING UNSPECIFIED SITE, UNSPECIFIED WHETHER RHEUMATOID FACTOR PRESENT: Primary | ICD-10-CM

## 2024-05-23 DIAGNOSIS — Z12.31 BREAST CANCER SCREENING BY MAMMOGRAM: ICD-10-CM

## 2024-05-23 DIAGNOSIS — E05.00 GRAVES DISEASE: ICD-10-CM

## 2024-05-23 DIAGNOSIS — M85.80 OSTEOPENIA, UNSPECIFIED LOCATION: ICD-10-CM

## 2024-05-23 LAB
25(OH)D3+25(OH)D2 SERPL-MCNC: 50 NG/ML (ref 30–80)
ALBUMIN SERPL-MCNC: 3.5 G/DL (ref 3.4–4.8)
ALBUMIN/GLOB SERPL: 0.9 RATIO (ref 1.1–2)
ALP SERPL-CCNC: 86 UNIT/L (ref 40–150)
ALT SERPL-CCNC: 11 UNIT/L (ref 0–55)
ANION GAP SERPL CALC-SCNC: 6 MEQ/L
AST SERPL-CCNC: 17 UNIT/L (ref 5–34)
BASOPHILS # BLD AUTO: 0.1 X10(3)/MCL
BASOPHILS NFR BLD AUTO: 1.4 %
BILIRUB SERPL-MCNC: 0.3 MG/DL
BUN SERPL-MCNC: 17.8 MG/DL (ref 9.8–20.1)
CALCIUM SERPL-MCNC: 10.2 MG/DL (ref 8.4–10.2)
CHLORIDE SERPL-SCNC: 107 MMOL/L (ref 98–107)
CO2 SERPL-SCNC: 28 MMOL/L (ref 23–31)
CREAT SERPL-MCNC: 0.85 MG/DL (ref 0.55–1.02)
CREAT/UREA NIT SERPL: 21
EOSINOPHIL # BLD AUTO: 0.08 X10(3)/MCL (ref 0–0.9)
EOSINOPHIL NFR BLD AUTO: 1.1 %
ERYTHROCYTE [DISTWIDTH] IN BLOOD BY AUTOMATED COUNT: 13.7 % (ref 11.5–17)
GFR SERPLBLD CREATININE-BSD FMLA CKD-EPI: >60 ML/MIN/1.73/M2
GLOBULIN SER-MCNC: 4.1 GM/DL (ref 2.4–3.5)
GLUCOSE SERPL-MCNC: 84 MG/DL (ref 82–115)
HCT VFR BLD AUTO: 39.1 % (ref 37–47)
HGB BLD-MCNC: 13.3 G/DL (ref 12–16)
IMM GRANULOCYTES # BLD AUTO: 0.01 X10(3)/MCL (ref 0–0.04)
IMM GRANULOCYTES NFR BLD AUTO: 0.1 %
LYMPHOCYTES # BLD AUTO: 2.37 X10(3)/MCL (ref 0.6–4.6)
LYMPHOCYTES NFR BLD AUTO: 33.6 %
MCH RBC QN AUTO: 30.6 PG (ref 27–31)
MCHC RBC AUTO-ENTMCNC: 34 G/DL (ref 33–36)
MCV RBC AUTO: 90.1 FL (ref 80–94)
MONOCYTES # BLD AUTO: 0.51 X10(3)/MCL (ref 0.1–1.3)
MONOCYTES NFR BLD AUTO: 7.2 %
NEUTROPHILS # BLD AUTO: 3.98 X10(3)/MCL (ref 2.1–9.2)
NEUTROPHILS NFR BLD AUTO: 56.6 %
NRBC BLD AUTO-RTO: 0 %
PLATELET # BLD AUTO: 318 X10(3)/MCL (ref 130–400)
PMV BLD AUTO: 10.6 FL (ref 7.4–10.4)
POTASSIUM SERPL-SCNC: 4.3 MMOL/L (ref 3.5–5.1)
PROT SERPL-MCNC: 7.6 GM/DL (ref 5.8–7.6)
RBC # BLD AUTO: 4.34 X10(6)/MCL (ref 4.2–5.4)
SODIUM SERPL-SCNC: 141 MMOL/L (ref 136–145)
T4 FREE SERPL-MCNC: 0.99 NG/DL (ref 0.7–1.48)
TSH SERPL-ACNC: 2.68 UIU/ML (ref 0.35–4.94)
WBC # SPEC AUTO: 7.05 X10(3)/MCL (ref 4.5–11.5)

## 2024-05-23 PROCEDURE — 84443 ASSAY THYROID STIM HORMONE: CPT

## 2024-05-23 PROCEDURE — 80053 COMPREHEN METABOLIC PANEL: CPT

## 2024-05-23 PROCEDURE — 82306 VITAMIN D 25 HYDROXY: CPT

## 2024-05-23 PROCEDURE — 84439 ASSAY OF FREE THYROXINE: CPT

## 2024-05-23 PROCEDURE — 85025 COMPLETE CBC W/AUTO DIFF WBC: CPT

## 2024-05-23 PROCEDURE — 36415 COLL VENOUS BLD VENIPUNCTURE: CPT

## 2024-05-23 PROCEDURE — 99214 OFFICE O/P EST MOD 30 MIN: CPT | Mod: PBBFAC | Performed by: STUDENT IN AN ORGANIZED HEALTH CARE EDUCATION/TRAINING PROGRAM

## 2024-05-23 RX ORDER — GABAPENTIN 400 MG/1
400 CAPSULE ORAL 3 TIMES DAILY
COMMUNITY
Start: 2024-04-14

## 2024-05-23 RX ORDER — ALBUTEROL SULFATE 90 UG/1
2 AEROSOL, METERED RESPIRATORY (INHALATION) EVERY 6 HOURS PRN
Qty: 18 G | Refills: 2 | Status: SHIPPED | OUTPATIENT
Start: 2024-05-23

## 2024-05-23 RX ORDER — FLUTICASONE PROPIONATE AND SALMETEROL 500; 50 UG/1; UG/1
1 POWDER RESPIRATORY (INHALATION) 2 TIMES DAILY
Qty: 60 EACH | Refills: 2 | Status: SHIPPED | OUTPATIENT
Start: 2024-05-23

## 2024-05-23 NOTE — PROGRESS NOTES
[facility]    Subjective:      61-year-old with past medical history of rheumatoid arthritis, fibromyalgia, thymoma status post removal, Graves' disease status post thyroidectomy on Synthroid, history of GERD, hypertension, pulmonary nodules today for follow-up. Following up with Dr. Lynch. No complaints today.   She is undergoing hysteroscopy D&C with gyn and needs medication clearance.    Review of Systems:  10 point ROS negative except for HPI    Objective:   Vital Signs:  Vitals:    05/23/24 1011   BP: 138/86   Pulse: 85   Resp: 18   Temp: 98.2 °F (36.8 °C)      General: well-developed well-nourished in no acute distress  Neck: full range of motion, no thyromegaly or lymphadenopathy  Respiratory: clear to auscultation bilaterally  Cardiovascular: regular rate and rhythm without murmurs, gallops or rubs  Gastrointestinal: soft, non-tender, non-distended with normal bowel sounds, without masses to palpation  Musculoskeletal: MCP PIP wrist elbows shoulders hips knees and ankles tender bilaterally.  Integumentary: no rashes or skin lesions present  Breast: No lumps or nodules noted or tender on palpation  Neurologic: cranial nerves intact, no signs of peripheral neurological deficit, motor/sensory function intact      Body mass index is 43.27 kg/m².         Laboratory:  Lab Results   Component Value Date    WBC 7.1 03/22/2023    HGB 13.2 03/22/2023    HCT 39.4 03/22/2023     03/22/2023    MCV 88.9 03/22/2023    RDW 14.2 03/22/2023    Lab Results   Component Value Date     03/22/2023    K 4.4 03/22/2023    CO2 30 03/22/2023    BUN 12.1 03/22/2023    CREATININE 0.90 03/22/2023    CALCIUM 10.0 03/22/2023      Lab Results   Component Value Date    HGBA1C 4.8 04/07/2022    EAG 91.1 04/07/2022    CREATININE 0.90 03/22/2023    Lab Results   Component Value Date    TSH 3.051 03/22/2023    [id number] 1.06 03/22/2023                Current Medications:  Current Outpatient Medications    Medication Instructions    albuterol (PROVENTIL/VENTOLIN HFA) 90 mcg/actuation inhaler 2 puffs, Inhalation, Every 6 hours PRN    ascorbic acid (vitamin C) (VITAMIN C) 500 mg, Oral, Daily    cyanocobalamin, vitamin B-12, (VITAMIN B-12) 2,500 mcg Subl 1 tablet, Sublingual, Daily    fluticasone propionate (FLONASE) 50 mcg/actuation nasal spray 1 spray, Each Nostril, Daily    fluticasone-salmeterol diskus inhaler 500-50 mcg 1 puff, Inhalation, 2 times daily, Controller    folic acid (FOLVITE) 1,000 mcg, Oral, Daily    gabapentin (NEURONTIN) 300 mg, Oral, 3 times daily    gabapentin (NEURONTIN) 400 mg, Oral, 3 times daily    HUMIRA(CF) PEN 40 mg, Subcutaneous, Weekly    HYDROcodone-acetaminophen (NORCO)  mg per tablet 1 tablet, Oral, Every 8 hours PRN    hydroxychloroquine (PLAQUENIL) 200 mg, Oral, 2 times daily    levothyroxine (SYNTHROID) 125 mcg, Oral, Daily    meclizine (ANTIVERT) 25 mg, Oral, 3 times daily PRN    methotrexate 2.5 MG Tab TAKE 3 TABLETS BY MOUTH AFTER LUNCH AND TAKE 3 TABLETS AFTER SUPPER ON TUESDAY S    montelukast (SINGULAIR) 10 mg, Oral, Daily, Need refill    nitroGLYCERIN (NITROSTAT) 0.4 MG SL tablet DISSOLVE ONE TABLET UNDER THE TONGUE EVERY 5 MINUTES AS NEEDED FOR CHEST PAIN. DO NOT EXCEED A TOTAL OF 3 DOSES IN 15 MINUTES. IF PAIN PERSISTS, SEEK MEDICAL ATTENTION    omeprazole (PRILOSEC) 40 mg, Oral, Every morning    tiZANidine (ZANAFLEX) 8 mg, Oral, Nightly    triamterene-hydrochlorothiazide 37.5-25 mg (MAXZIDE-25) 37.5-25 mg per tablet 1 tablet, Oral, Daily    valACYclovir (VALTREX) 500 mg, Oral, Nightly        Assessment and Plan:      Seropositive rheumatoid arthritis  FRANK positive, rheumatoid factor positive, CCP positive  Continue Plaquenil 200 mg twice daily  Completed prednisone taper  Continue methotrexate 15 mg and folic acid daily   Continue tizanidine, gabapentin and norco  for pain   Continue following Rheum with Dr. Lynch( last seen 10/19/22)  Continue  humira    Fibromyalgia  Denies any muscle pain or body pain at this time  Continue tizanidine, gabapentin and norco  for pain     Thymoma status post thymectomy 2014  Denies chest pain, hoarse voice, swelling in the face, neck, upper body arms at this time  Was Following ENT , Last seen AT 9/3/20, continue Flonase, antihistamine per ENT    Graves' disease status post thyroidectomy  On Synthroid daily  Denies heart palpitations  ENT referred for a thyroid US with results below :  Ultrasound thyroid on 11/02/2020 shows right thyroidectomy.  1 cm left-sided thyroid nodule.  Hypervascular left level thyroid consistent with thyroiditis.  TFTS wnl 3/23 and Thyroid US on 1/23 stable       Non -CAD   [facility] in 1997;No interventions done per pt  Was following cards in the past   Last seen cardiologist on 09/30/2020  Lexiscan stress test on 06/24/2020 - for ischemia  Echo done on 08/07/2020 shows left undergo ejection fraction 55 to 60%  Denies any chest pain or SOB at this time    GERD  Patient used to follow up with GI before but lost in touch when covid hits  H. pylori gastritis confirmed by biopsy January 2020, completed antibiotics   fecal occult immunoassay for colon cancer screening was neg in 9/2021  switched from Nexium to Rabeprazole 40 daily at this time  Refused EGD appt scheduled on 11/22 with GI   Interested in doing EGD; called pt to call GI and let them about change of decision      Hypertension- controlled  Continue HCTZ-traimterene    Vertigo   Started on meclizine PRN     Fam hx of breast cancer  Mammogram ordered on 4/23; calling central scheduling for appt  Genetic panel ordered     HSV+ 1 AND 2  Completed Acyclovir treatment; PRN meds for outbreaks   Not having any flares at this time  Syphilis, hep panel  and HIV neg    Follows gyn        Flu shot uptd  mammogram 4/23 referral sent; referral sent again  papsmear appt in 4/23  Refuses colonoscopy and EGD;follows GI  Last DXA in 2020 showed osteopenia  ;  repeat DXA 1/23- osteopenia; continue vitamin d and ca tabs   stable pum nodules for years(last CT scan in 2017); uptd 2023  Refuses vaccines  Referral to PT for sciatica and OA   labs today           Pt had a hx of LHC in 1994 and she stated she has had no blockages or stents placed at the time. No records available.Continues aspirin  daily. Denies any chest pain, SOB or NGUYEN at this time. RCRI of 1 with low to moderate risk for low to moderate procedure. Pt has RA and is methotrexate, plaquenil and humira injections. She can continue other rheum meds except humira but instructed to call her rheumatology and get recommendation. She can hold aspirin 5 days before the procedure. Not on blood thinners. If patient needs intubation for the procedure, she needs xray of the cervical spine given hx of RA.       Health Maintenance   Topic Date Due    Shingles Vaccine (1 of 2) Never done    Mammogram  09/06/2023    Colorectal Cancer Screening  08/21/2026    Lipid Panel  04/07/2027    TETANUS VACCINE  07/18/2030    Hepatitis C Screening  Completed            RTC in 4 months       Laura Chin DO

## 2024-05-23 NOTE — TELEPHONE ENCOUNTER
Pateint seen today for medical clearance, please review office note with Dr Chin to proceed. Thank you for referral.

## 2024-05-24 ENCOUNTER — OFFICE VISIT (OUTPATIENT)
Dept: GYNECOLOGY | Facility: CLINIC | Age: 64
End: 2024-05-24
Payer: MEDICARE

## 2024-05-24 VITALS
TEMPERATURE: 99 F | BODY MASS INDEX: 43.27 KG/M2 | HEART RATE: 79 BPM | SYSTOLIC BLOOD PRESSURE: 138 MMHG | DIASTOLIC BLOOD PRESSURE: 86 MMHG | HEIGHT: 62 IN | OXYGEN SATURATION: 97 %

## 2024-05-24 DIAGNOSIS — N95.0 PMB (POSTMENOPAUSAL BLEEDING): Primary | ICD-10-CM

## 2024-05-24 PROCEDURE — 99214 OFFICE O/P EST MOD 30 MIN: CPT | Mod: PBBFAC

## 2024-05-24 NOTE — PROGRESS NOTES
[facility] Preoperative Visit History and Physical    HPI:   63 y.o.  with a PMH of HTN, RA, and Grave's disease s/p thyroid lobectomy now with hypothyroidism who presents for surgical discussion in setting of PMB.    Patient states she had prior episode of PMB in 2019 for which she underwent HSC polypectomy. She then represented with 2 subsequent episodes of vaginal bleeding in 2023 and 2023. EMB collected in 2023 resulted with insufficient tissue for diagnosis.     PMH:  Past Medical History:   Diagnosis Date    Asthma     Breast disorder     Fibromyalgia     Graves' disease     Herpes simplex virus (HSV) infection     Hypertension     Hypothyroid     Insomnia     Mediastinal mass     Postoperative hypothyroidism     Rheumatoid arthritis     Seropositive rheumatoid arthritis     Thymoma         ObHx:  OB History    Para Term  AB Living   4 4 4     1   SAB IAB Ectopic Multiple Live Births           1      # Outcome Date GA Lbr Nicholas/2nd Weight Sex Type Anes PTL Lv   4 Term      Vag-Spont      3 Term      Vag-Spont      2 Term      Vag-Spont      1 Term      Vag-Spont   SLOAN        GynHx:  Menopause: 56 yo  Denies history of abnormal paps; last pap NILM, HPV-  History of HSV-2     SurgHx:  Past Surgical History:   Procedure Laterality Date    BILATERAL TUBAL LIGATION      BREAST BIOPSY      CARDIAC CATHETERIZATION      DILATION AND CURETTAGE OF UTERUS      ESOPHAGOGASTRODUODENOSCOPY      MEDIASTINAL MASS EXCISION      MOUTH SURGERY      had bottom teeth pulled    MULTIPLE TOOTH EXTRACTIONS      All teeth now removed    THYROIDECTOMY, PARTIAL          FamHx:  Family History   Problem Relation Name Age of Onset    Hypertension Mother      Diabetes Mother      Breast cancer Mother  45        bilateral    Lung cancer Father      Diabetes Daughter      Cervical cancer Daughter  27    Lung cancer Maternal Uncle     Denies history of breast, ovarian, endometrial, colon  cancers.    SocialHx:  Social History     Socioeconomic History    Marital status:      Spouse name: Rolando    Number of children: 4   Occupational History    Occupation: disabled   Tobacco Use    Smoking status: Never    Smokeless tobacco: Never   Substance and Sexual Activity    Alcohol use: Not Currently     Alcohol/week: 1.0 standard drink of alcohol     Types: 1 Glasses of wine per week     Comment: ocassionally    Drug use: Never    Sexual activity: Not Currently     Partners: Male     Birth control/protection: None     Social Determinants of Health     Financial Resource Strain: Low Risk  (5/23/2024)    Overall Financial Resource Strain (CARDIA)     Difficulty of Paying Living Expenses: Not hard at all   Food Insecurity: No Food Insecurity (5/23/2024)    Hunger Vital Sign     Worried About Running Out of Food in the Last Year: Never true     Ran Out of Food in the Last Year: Never true   Transportation Needs: No Transportation Needs (5/23/2024)    TRANSPORTATION NEEDS     Transportation : No   Physical Activity: Inactive (5/23/2024)    Exercise Vital Sign     Days of Exercise per Week: 0 days     Minutes of Exercise per Session: 0 min   Stress: No Stress Concern Present (5/23/2024)    [employer] - Occupational Stress Questionnaire     Feeling of Stress : Not at all   Housing Stability: Low Risk  (6/23/2022)    Housing Stability Vital Sign     Unable to Pay for Housing in the Last Year: No     Number of Places Lived in the Last Year: 1     Unstable Housing in the Last Year: No        All:  NKDA    Meds:  Current Outpatient Medications on File Prior to Visit   Medication Sig Dispense Refill    albuterol (PROVENTIL/VENTOLIN HFA) 90 mcg/actuation inhaler Inhale 2 puffs into the lungs every 6 (six) hours as needed for Shortness of Breath. 18 g 2    ascorbic acid, vitamin C, (VITAMIN C) 500 MG tablet Take 500 mg by mouth once daily.      cyanocobalamin, vitamin B-12,  (VITAMIN B-12) 2,500 mcg Subl Place 1 tablet under the tongue Daily. 90 each 3    fluticasone propionate (FLONASE) 50 mcg/actuation nasal spray 1 spray by Each Nostril route Daily.      fluticasone-salmeterol diskus inhaler 500-50 mcg Inhale 1 puff into the lungs 2 (two) times daily. Controller 60 each 2    folic acid (FOLVITE) 1 MG tablet Take 1 tablet (1,000 mcg total) by mouth once daily. 90 tablet 3    gabapentin (NEURONTIN) 400 MG capsule Take 400 mg by mouth 3 (three) times daily.      HUMIRA,CF, PEN 40 mg/0.4 mL PnKt Inject 0.4 mLs (40 mg total) into the skin once a week. 4 pen 11    HYDROcodone-acetaminophen (NORCO)  mg per tablet Take 1 tablet by mouth every 8 (eight) hours as needed for Pain. 90 tablet 0    hydroxychloroquine (PLAQUENIL) 200 mg tablet Take 1 tablet (200 mg total) by mouth 2 (two) times daily. 180 tablet 3    levothyroxine (SYNTHROID) 125 MCG tablet Take 1 tablet (125 mcg total) by mouth once daily. 90 tablet 2    methotrexate 2.5 MG Tab TAKE 3 TABLETS BY MOUTH AFTER LUNCH AND TAKE 3 TABLETS AFTER SUPPER ON TUESDAY S 90 tablet 3    montelukast (SINGULAIR) 10 mg tablet Take 10 mg by mouth once daily. Need refill      omeprazole (PRILOSEC) 40 MG capsule Take 1 capsule (40 mg total) by mouth every morning. 90 capsule 3    tiZANidine (ZANAFLEX) 4 MG tablet Take 2 tablets (8 mg total) by mouth nightly. 180 tablet 3    triamterene-hydrochlorothiazide 37.5-25 mg (MAXZIDE-25) 37.5-25 mg per tablet Take 1 tablet by mouth once daily. 90 tablet 2    valACYclovir (VALTREX) 500 MG tablet Take 1 tablet (500 mg total) by mouth every evening. 30 tablet 11    meclizine (ANTIVERT) 25 mg tablet Take 1 tablet (25 mg total) by mouth 3 (three) times daily as needed. (Patient not taking: Reported on 5/24/2024) 30 tablet 2    nitroGLYCERIN (NITROSTAT) 0.4 MG SL tablet DISSOLVE ONE TABLET UNDER THE TONGUE EVERY 5 MINUTES AS NEEDED FOR CHEST PAIN. DO NOT EXCEED A TOTAL OF 3 DOSES IN 15 MINUTES. IF PAIN  "PERSISTS, SEEK MEDICAL ATTENTION (Patient not taking: Reported on 2024)       No current facility-administered medications on file prior to visit.     Review of Systems: As stated in HPI.      Objective:     Vitals:    24 0923   BP: 138/86   BP Location: Left arm   Pulse: 79   Temp: 98.8 °F (37.1 °C)   SpO2: 97%   Height: 5' 2" (1.575 m)     Body mass index is 43.27 kg/m².    PHYSICAL EXAM  GEN: NAD, A&O x3  CV: RRR   LUNGS: CTABL  ABDOMEN: soft, NTND, no masses  INCISIONS: no abdominal incisions, sternotomy incision noted    LABS:  Last mammogram:  Right breast BIRADs 2, left breast BIRADs 1  Last pap: 2023 NILM, HPV-  EMB: 2023 insufficient    IMAGING:  TVUS:   US Pelvis Comp with Transvag NON-OB (xpd 2023    Narrative  EXAMINATION:  US PELVIS COMP WITH TRANSVAG NON-OB (XPD)    CLINICAL HISTORY:  Postmenopausal bleeding    TECHNIQUE:  Transabdominal and transvaginal images were obtained    COMPARISON:  11/15/2018    FINDINGS:  Uterus measures 8.0 x 3.9 x 5.0 cm.  There are multiple hypoechoic fibroids measuring up to 2.6 cm at the fundus.  A 1.3 cm hypoechoic fibroid lies along the left margin of the endometrial cavity.  Endometrium measures 0.3 cm.    Ovaries are not identified.  No adnexal masses or free fluid.    Impression  1. Multiple uterine fibroids including 1 which has a partial submucosal appearance.      Electronically signed by: Allan Murphy MD  Date:    2023  Time:    11:33     Assessment:      63 y.o.   for hysteroscopy D&C in setting of PMB. Patient desires definitive management via hysterectomy.    1. PMB (postmenopausal bleeding)            Plan:     Counseling: Alternatives to this planned procedure were explained to the patient including expectant, medical and other types of surgical management.  This procedure and its risks, reasons, and benefits were were reviewed in detail.   Alternatives to this planned procedure were explained to the patient including " expectant management or serial ultrasounds with risk of disease progression to malignancy, in office EMB. This procedure and its risks, reasons, benefits and complications (including perforation, subsequent injury to bowel, air/gas embolism, fluid overload, major blood vessel,hemorrhage, possibility of transfusion, infection, scarring, dyspareunia, further surgery, failure of the procedure) were reviewed in detail. Complication rate less than 1% overall.   We have reviewed the typical perioperative course with hospital stay, and post-operative precautions and restrictions have been reviewed.    Transfusion of blood and blood products discussed. Patient was consented for blood transfusion in the case of an emergency.  She understands the possibility of blood transfusion reaction and the attendant risk of transmission of HIV & Hepatitis C to be 1 in 2 million and the risk of Hepatitis B to be 1 in 200,000.  She is aware of the possibility of transfusion reaction. All questions were answered.   Patient understands we are affiliated with a teaching institution and residents and medical students will be involved in her care.  She has consented to an exam under anesthesia and understands that medical students participate in this portion of the procedure.  All questions were answered.    Preop testing ordered.  Surgery case scheduled. Surgical consents signed.  Instructions reviewed, including NPO after midnight.   Counseled to hold the following medications on the day of surgery: per anesthesia  Current contraception: none, post-menopausal    To OR for hysteroscopy D&C with Dr. Lares   Scheduled on 5/28/2024    Discussed with Dr. Lares.    Jamilah Scott MD  [facility] - PGY-2  10:39 PM 05/24/2024

## 2024-05-24 NOTE — H&P (VIEW-ONLY)
[facility] Preoperative Visit History and Physical    HPI:   63 y.o.  with a PMH of HTN, RA, and Grave's disease s/p thyroid lobectomy now with hypothyroidism who presents for surgical discussion in setting of PMB.    Patient states she had prior episode of PMB in 2019 for which she underwent HSC polypectomy. She then represented with 2 subsequent episodes of vaginal bleeding in 2023 and 2023. EMB collected in 2023 resulted with insufficient tissue for diagnosis.     PMH:  Past Medical History:   Diagnosis Date    Asthma     Breast disorder     Fibromyalgia     Graves' disease     Herpes simplex virus (HSV) infection     Hypertension     Hypothyroid     Insomnia     Mediastinal mass     Postoperative hypothyroidism     Rheumatoid arthritis     Seropositive rheumatoid arthritis     Thymoma         ObHx:  OB History    Para Term  AB Living   4 4 4     1   SAB IAB Ectopic Multiple Live Births           1      # Outcome Date GA Lbr Nicholas/2nd Weight Sex Type Anes PTL Lv   4 Term      Vag-Spont      3 Term      Vag-Spont      2 Term      Vag-Spont      1 Term      Vag-Spont   SLOAN        GynHx:  Menopause: 54 yo  Denies history of abnormal paps; last pap NILM, HPV-  History of HSV-2     SurgHx:  Past Surgical History:   Procedure Laterality Date    BILATERAL TUBAL LIGATION      BREAST BIOPSY      CARDIAC CATHETERIZATION      DILATION AND CURETTAGE OF UTERUS      ESOPHAGOGASTRODUODENOSCOPY      MEDIASTINAL MASS EXCISION      MOUTH SURGERY      had bottom teeth pulled    MULTIPLE TOOTH EXTRACTIONS      All teeth now removed    THYROIDECTOMY, PARTIAL          FamHx:  Family History   Problem Relation Name Age of Onset    Hypertension Mother      Diabetes Mother      Breast cancer Mother  45        bilateral    Lung cancer Father      Diabetes Daughter      Cervical cancer Daughter  27    Lung cancer Maternal Uncle     Denies history of breast, ovarian, endometrial, colon  cancers.    SocialHx:  Social History     Socioeconomic History    Marital status:      Spouse name: Rolando    Number of children: 4   Occupational History    Occupation: disabled   Tobacco Use    Smoking status: Never    Smokeless tobacco: Never   Substance and Sexual Activity    Alcohol use: Not Currently     Alcohol/week: 1.0 standard drink of alcohol     Types: 1 Glasses of wine per week     Comment: ocassionally    Drug use: Never    Sexual activity: Not Currently     Partners: Male     Birth control/protection: None     Social Determinants of Health     Financial Resource Strain: Low Risk  (5/23/2024)    Overall Financial Resource Strain (CARDIA)     Difficulty of Paying Living Expenses: Not hard at all   Food Insecurity: No Food Insecurity (5/23/2024)    Hunger Vital Sign     Worried About Running Out of Food in the Last Year: Never true     Ran Out of Food in the Last Year: Never true   Transportation Needs: No Transportation Needs (5/23/2024)    TRANSPORTATION NEEDS     Transportation : No   Physical Activity: Inactive (5/23/2024)    Exercise Vital Sign     Days of Exercise per Week: 0 days     Minutes of Exercise per Session: 0 min   Stress: No Stress Concern Present (5/23/2024)    [employer] [employer] - Occupational Stress Questionnaire     Feeling of Stress : Not at all   Housing Stability: Low Risk  (6/23/2022)    Housing Stability Vital Sign     Unable to Pay for Housing in the Last Year: No     Number of Places Lived in the Last Year: 1     Unstable Housing in the Last Year: No        All:  NKDA    Meds:  Current Outpatient Medications on File Prior to Visit   Medication Sig Dispense Refill    albuterol (PROVENTIL/VENTOLIN HFA) 90 mcg/actuation inhaler Inhale 2 puffs into the lungs every 6 (six) hours as needed for Shortness of Breath. 18 g 2    ascorbic acid, vitamin C, (VITAMIN C) 500 MG tablet Take 500 mg by mouth once daily.      cyanocobalamin, vitamin B-12,  (VITAMIN B-12) 2,500 mcg Subl Place 1 tablet under the tongue Daily. 90 each 3    fluticasone propionate (FLONASE) 50 mcg/actuation nasal spray 1 spray by Each Nostril route Daily.      fluticasone-salmeterol diskus inhaler 500-50 mcg Inhale 1 puff into the lungs 2 (two) times daily. Controller 60 each 2    folic acid (FOLVITE) 1 MG tablet Take 1 tablet (1,000 mcg total) by mouth once daily. 90 tablet 3    gabapentin (NEURONTIN) 400 MG capsule Take 400 mg by mouth 3 (three) times daily.      HUMIRA,CF, PEN 40 mg/0.4 mL PnKt Inject 0.4 mLs (40 mg total) into the skin once a week. 4 pen 11    HYDROcodone-acetaminophen (NORCO)  mg per tablet Take 1 tablet by mouth every 8 (eight) hours as needed for Pain. 90 tablet 0    hydroxychloroquine (PLAQUENIL) 200 mg tablet Take 1 tablet (200 mg total) by mouth 2 (two) times daily. 180 tablet 3    levothyroxine (SYNTHROID) 125 MCG tablet Take 1 tablet (125 mcg total) by mouth once daily. 90 tablet 2    methotrexate 2.5 MG Tab TAKE 3 TABLETS BY MOUTH AFTER LUNCH AND TAKE 3 TABLETS AFTER SUPPER ON TUESDAY S 90 tablet 3    montelukast (SINGULAIR) 10 mg tablet Take 10 mg by mouth once daily. Need refill      omeprazole (PRILOSEC) 40 MG capsule Take 1 capsule (40 mg total) by mouth every morning. 90 capsule 3    tiZANidine (ZANAFLEX) 4 MG tablet Take 2 tablets (8 mg total) by mouth nightly. 180 tablet 3    triamterene-hydrochlorothiazide 37.5-25 mg (MAXZIDE-25) 37.5-25 mg per tablet Take 1 tablet by mouth once daily. 90 tablet 2    valACYclovir (VALTREX) 500 MG tablet Take 1 tablet (500 mg total) by mouth every evening. 30 tablet 11    meclizine (ANTIVERT) 25 mg tablet Take 1 tablet (25 mg total) by mouth 3 (three) times daily as needed. (Patient not taking: Reported on 5/24/2024) 30 tablet 2    nitroGLYCERIN (NITROSTAT) 0.4 MG SL tablet DISSOLVE ONE TABLET UNDER THE TONGUE EVERY 5 MINUTES AS NEEDED FOR CHEST PAIN. DO NOT EXCEED A TOTAL OF 3 DOSES IN 15 MINUTES. IF PAIN  "PERSISTS, SEEK MEDICAL ATTENTION (Patient not taking: Reported on 2024)       No current facility-administered medications on file prior to visit.     Review of Systems: As stated in HPI.      Objective:     Vitals:    24 0923   BP: 138/86   BP Location: Left arm   Pulse: 79   Temp: 98.8 °F (37.1 °C)   SpO2: 97%   Height: 5' 2" (1.575 m)     Body mass index is 43.27 kg/m².    PHYSICAL EXAM  GEN: NAD, A&O x3  CV: RRR   LUNGS: CTABL  ABDOMEN: soft, NTND, no masses  INCISIONS: no abdominal incisions, sternotomy incision noted    LABS:  Last mammogram:  Right breast BIRADs 2, left breast BIRADs 1  Last pap: 2023 NILM, HPV-  EMB: 2023 insufficient    IMAGING:  TVUS:   US Pelvis Comp with Transvag NON-OB (xpd 2023    Narrative  EXAMINATION:  US PELVIS COMP WITH TRANSVAG NON-OB (XPD)    CLINICAL HISTORY:  Postmenopausal bleeding    TECHNIQUE:  Transabdominal and transvaginal images were obtained    COMPARISON:  11/15/2018    FINDINGS:  Uterus measures 8.0 x 3.9 x 5.0 cm.  There are multiple hypoechoic fibroids measuring up to 2.6 cm at the fundus.  A 1.3 cm hypoechoic fibroid lies along the left margin of the endometrial cavity.  Endometrium measures 0.3 cm.    Ovaries are not identified.  No adnexal masses or free fluid.    Impression  1. Multiple uterine fibroids including 1 which has a partial submucosal appearance.      Electronically signed by: Allan Murphy MD  Date:    2023  Time:    11:33     Assessment:      63 y.o.   for hysteroscopy D&C in setting of PMB. Patient desires definitive management via hysterectomy.    1. PMB (postmenopausal bleeding)            Plan:     Counseling: Alternatives to this planned procedure were explained to the patient including expectant, medical and other types of surgical management.  This procedure and its risks, reasons, and benefits were were reviewed in detail.   Alternatives to this planned procedure were explained to the patient including " expectant management or serial ultrasounds with risk of disease progression to malignancy, in office EMB. This procedure and its risks, reasons, benefits and complications (including perforation, subsequent injury to bowel, air/gas embolism, fluid overload, major blood vessel,hemorrhage, possibility of transfusion, infection, scarring, dyspareunia, further surgery, failure of the procedure) were reviewed in detail. Complication rate less than 1% overall.   We have reviewed the typical perioperative course with hospital stay, and post-operative precautions and restrictions have been reviewed.    Transfusion of blood and blood products discussed. Patient was consented for blood transfusion in the case of an emergency.  She understands the possibility of blood transfusion reaction and the attendant risk of transmission of HIV & Hepatitis C to be 1 in 2 million and the risk of Hepatitis B to be 1 in 200,000.  She is aware of the possibility of transfusion reaction. All questions were answered.   Patient understands we are affiliated with a teaching institution and residents and medical students will be involved in her care.  She has consented to an exam under anesthesia and understands that medical students participate in this portion of the procedure.  All questions were answered.    Preop testing ordered.  Surgery case scheduled. Surgical consents signed.  Instructions reviewed, including NPO after midnight.   Counseled to hold the following medications on the day of surgery: per anesthesia  Current contraception: none, post-menopausal    To OR for hysteroscopy D&C with Dr. Lares   Scheduled on 5/28/2024    Discussed with Dr. Lares.    Jamilah Scott MD  [facility] - PGY-2  10:39 PM 05/24/2024

## 2024-05-24 NOTE — PROGRESS NOTES
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

## 2024-05-27 ENCOUNTER — PATIENT MESSAGE (OUTPATIENT)
Dept: SURGERY | Facility: HOSPITAL | Age: 64
End: 2024-05-27
Payer: MEDICARE

## 2024-05-28 ENCOUNTER — HOSPITAL ENCOUNTER (OUTPATIENT)
Facility: HOSPITAL | Age: 64
Discharge: HOME | End: 2024-05-28
Attending: OBSTETRICS & GYNECOLOGY | Admitting: OBSTETRICS & GYNECOLOGY
Payer: MEDICARE

## 2024-05-28 ENCOUNTER — ANESTHESIA (OUTPATIENT)
Dept: SURGERY | Facility: HOSPITAL | Age: 64
End: 2024-05-28
Payer: MEDICARE

## 2024-05-28 DIAGNOSIS — K59.09 CHRONIC CONSTIPATION: ICD-10-CM

## 2024-05-28 DIAGNOSIS — M05.9 SEROPOSITIVE RHEUMATOID ARTHRITIS: ICD-10-CM

## 2024-05-28 DIAGNOSIS — D49.89 THYMOMA: ICD-10-CM

## 2024-05-28 DIAGNOSIS — N95.0 POST-MENOPAUSAL BLEEDING: ICD-10-CM

## 2024-05-28 DIAGNOSIS — N95.0 PMB (POSTMENOPAUSAL BLEEDING): Primary | ICD-10-CM

## 2024-05-28 DIAGNOSIS — G47.00 INSOMNIA, UNSPECIFIED TYPE: ICD-10-CM

## 2024-05-28 DIAGNOSIS — Z98.890 STATUS POST HYSTEROSCOPY: ICD-10-CM

## 2024-05-28 DIAGNOSIS — Z87.898 H/O THYMOMA: ICD-10-CM

## 2024-05-28 DIAGNOSIS — M79.7 FIBROMYALGIA: ICD-10-CM

## 2024-05-28 DIAGNOSIS — K21.9 GASTROESOPHAGEAL REFLUX DISEASE WITHOUT ESOPHAGITIS: ICD-10-CM

## 2024-05-28 DIAGNOSIS — F51.01 PRIMARY INSOMNIA: ICD-10-CM

## 2024-05-28 LAB — POCT GLUCOSE: 98 MG/DL (ref 70–110)

## 2024-05-28 PROCEDURE — D9220A PRA ANESTHESIA: Mod: ANES,,, | Performed by: SPECIALIST

## 2024-05-28 PROCEDURE — 25000003 PHARM REV CODE 250

## 2024-05-28 PROCEDURE — 71000015 HC POSTOP RECOV 1ST HR: Performed by: OBSTETRICS & GYNECOLOGY

## 2024-05-28 PROCEDURE — 88305 TISSUE EXAM BY PATHOLOGIST: CPT | Mod: TC | Performed by: OBSTETRICS & GYNECOLOGY

## 2024-05-28 PROCEDURE — 37000009 HC ANESTHESIA EA ADD 15 MINS: Performed by: OBSTETRICS & GYNECOLOGY

## 2024-05-28 PROCEDURE — 71000033 HC RECOVERY, INTIAL HOUR: Performed by: OBSTETRICS & GYNECOLOGY

## 2024-05-28 PROCEDURE — 36000706: Performed by: OBSTETRICS & GYNECOLOGY

## 2024-05-28 PROCEDURE — 25000003 PHARM REV CODE 250: Performed by: SPECIALIST

## 2024-05-28 PROCEDURE — 36000707: Performed by: OBSTETRICS & GYNECOLOGY

## 2024-05-28 PROCEDURE — 63600175 PHARM REV CODE 636 W HCPCS: Performed by: SPECIALIST

## 2024-05-28 PROCEDURE — 27201423 OPTIME MED/SURG SUP & DEVICES STERILE SUPPLY: Performed by: OBSTETRICS & GYNECOLOGY

## 2024-05-28 PROCEDURE — 25000003 PHARM REV CODE 250: Performed by: NURSE ANESTHETIST, CERTIFIED REGISTERED

## 2024-05-28 PROCEDURE — 71000016 HC POSTOP RECOV ADDL HR: Performed by: OBSTETRICS & GYNECOLOGY

## 2024-05-28 PROCEDURE — 37000008 HC ANESTHESIA 1ST 15 MINUTES: Performed by: OBSTETRICS & GYNECOLOGY

## 2024-05-28 PROCEDURE — 63600175 PHARM REV CODE 636 W HCPCS: Performed by: NURSE ANESTHETIST, CERTIFIED REGISTERED

## 2024-05-28 PROCEDURE — D9220A PRA ANESTHESIA: Mod: CRNA,,, | Performed by: NURSE ANESTHETIST, CERTIFIED REGISTERED

## 2024-05-28 PROCEDURE — 25000003 PHARM REV CODE 250: Performed by: STUDENT IN AN ORGANIZED HEALTH CARE EDUCATION/TRAINING PROGRAM

## 2024-05-28 RX ORDER — SUCCINYLCHOLINE CHLORIDE 20 MG/ML
INJECTION INTRAMUSCULAR; INTRAVENOUS
Status: DISCONTINUED | OUTPATIENT
Start: 2024-05-28 | End: 2024-05-28

## 2024-05-28 RX ORDER — SCOLOPAMINE TRANSDERMAL SYSTEM 1 MG/1
1 PATCH, EXTENDED RELEASE TRANSDERMAL
Status: DISCONTINUED | OUTPATIENT
Start: 2024-05-28 | End: 2024-05-28 | Stop reason: HOSPADM

## 2024-05-28 RX ORDER — ONDANSETRON HYDROCHLORIDE 2 MG/ML
4 INJECTION, SOLUTION INTRAVENOUS ONCE
Status: COMPLETED | OUTPATIENT
Start: 2024-05-28 | End: 2024-05-28

## 2024-05-28 RX ORDER — FAMOTIDINE 20 MG/1
20 TABLET, FILM COATED ORAL
Status: COMPLETED | OUTPATIENT
Start: 2024-05-28 | End: 2024-05-28

## 2024-05-28 RX ORDER — LIDOCAINE HYDROCHLORIDE 20 MG/ML
INJECTION, SOLUTION EPIDURAL; INFILTRATION; INTRACAUDAL; PERINEURAL
Status: DISCONTINUED | OUTPATIENT
Start: 2024-05-28 | End: 2024-05-28

## 2024-05-28 RX ORDER — SODIUM CHLORIDE 9 MG/ML
INJECTION, SOLUTION INTRAVENOUS CONTINUOUS
Status: DISCONTINUED | OUTPATIENT
Start: 2024-05-28 | End: 2024-05-28 | Stop reason: HOSPADM

## 2024-05-28 RX ORDER — MISOPROSTOL 100 UG/1
200 TABLET ORAL
Status: COMPLETED | OUTPATIENT
Start: 2024-05-28 | End: 2024-05-28

## 2024-05-28 RX ORDER — IPRATROPIUM BROMIDE AND ALBUTEROL SULFATE 2.5; .5 MG/3ML; MG/3ML
3 SOLUTION RESPIRATORY (INHALATION) ONCE AS NEEDED
Status: DISCONTINUED | OUTPATIENT
Start: 2024-05-28 | End: 2024-05-28 | Stop reason: HOSPADM

## 2024-05-28 RX ORDER — DIPHENHYDRAMINE HYDROCHLORIDE 50 MG/ML
25 INJECTION INTRAMUSCULAR; INTRAVENOUS ONCE AS NEEDED
Status: DISCONTINUED | OUTPATIENT
Start: 2024-05-28 | End: 2024-05-28 | Stop reason: HOSPADM

## 2024-05-28 RX ORDER — MEPERIDINE HYDROCHLORIDE 25 MG/ML
12.5 INJECTION INTRAMUSCULAR; INTRAVENOUS; SUBCUTANEOUS ONCE
Status: DISCONTINUED | OUTPATIENT
Start: 2024-05-28 | End: 2024-05-28 | Stop reason: HOSPADM

## 2024-05-28 RX ORDER — ACETAMINOPHEN 500 MG
1000 TABLET ORAL EVERY 8 HOURS
Qty: 30 TABLET | Refills: 0 | Status: SHIPPED | OUTPATIENT
Start: 2024-05-28

## 2024-05-28 RX ORDER — HYDROMORPHONE HYDROCHLORIDE 1 MG/ML
0.5 INJECTION, SOLUTION INTRAMUSCULAR; INTRAVENOUS; SUBCUTANEOUS EVERY 5 MIN PRN
Status: DISCONTINUED | OUTPATIENT
Start: 2024-05-28 | End: 2024-05-28 | Stop reason: HOSPADM

## 2024-05-28 RX ORDER — MUPIROCIN 20 MG/G
OINTMENT TOPICAL
Status: DISCONTINUED | OUTPATIENT
Start: 2024-05-28 | End: 2024-05-28 | Stop reason: HOSPADM

## 2024-05-28 RX ORDER — PROCHLORPERAZINE EDISYLATE 5 MG/ML
5 INJECTION INTRAMUSCULAR; INTRAVENOUS ONCE AS NEEDED
Status: DISCONTINUED | OUTPATIENT
Start: 2024-05-28 | End: 2024-05-28 | Stop reason: HOSPADM

## 2024-05-28 RX ORDER — PROPOFOL 10 MG/ML
VIAL (ML) INTRAVENOUS
Status: DISCONTINUED | OUTPATIENT
Start: 2024-05-28 | End: 2024-05-28

## 2024-05-28 RX ORDER — HYDROMORPHONE HYDROCHLORIDE 1 MG/ML
0.2 INJECTION, SOLUTION INTRAMUSCULAR; INTRAVENOUS; SUBCUTANEOUS EVERY 5 MIN PRN
Status: DISCONTINUED | OUTPATIENT
Start: 2024-05-28 | End: 2024-05-28 | Stop reason: HOSPADM

## 2024-05-28 RX ORDER — ACETAMINOPHEN 500 MG
1000 TABLET ORAL
Status: COMPLETED | OUTPATIENT
Start: 2024-05-28 | End: 2024-05-28

## 2024-05-28 RX ORDER — SODIUM CHLORIDE, SODIUM LACTATE, POTASSIUM CHLORIDE, CALCIUM CHLORIDE 600; 310; 30; 20 MG/100ML; MG/100ML; MG/100ML; MG/100ML
INJECTION, SOLUTION INTRAVENOUS CONTINUOUS
Status: DISCONTINUED | OUTPATIENT
Start: 2024-05-28 | End: 2024-05-28 | Stop reason: HOSPADM

## 2024-05-28 RX ORDER — OXYCODONE AND ACETAMINOPHEN 5; 325 MG/1; MG/1
2 TABLET ORAL ONCE
Status: COMPLETED | OUTPATIENT
Start: 2024-05-28 | End: 2024-05-28

## 2024-05-28 RX ORDER — FENTANYL CITRATE 50 UG/ML
INJECTION, SOLUTION INTRAMUSCULAR; INTRAVENOUS
Status: DISCONTINUED | OUTPATIENT
Start: 2024-05-28 | End: 2024-05-28

## 2024-05-28 RX ADMIN — SUGAMMADEX 200 MG: 100 INJECTION, SOLUTION INTRAVENOUS at 09:05

## 2024-05-28 RX ADMIN — LIDOCAINE HYDROCHLORIDE 50 MG: 20 INJECTION, SOLUTION EPIDURAL; INFILTRATION; INTRACAUDAL; PERINEURAL at 09:05

## 2024-05-28 RX ADMIN — ONDANSETRON 4 MG: 2 INJECTION INTRAMUSCULAR; INTRAVENOUS at 11:05

## 2024-05-28 RX ADMIN — SODIUM CHLORIDE, POTASSIUM CHLORIDE, SODIUM LACTATE AND CALCIUM CHLORIDE: 600; 310; 30; 20 INJECTION, SOLUTION INTRAVENOUS at 08:05

## 2024-05-28 RX ADMIN — SCOPOLAMINE 1 PATCH: 1 PATCH TRANSDERMAL at 07:05

## 2024-05-28 RX ADMIN — MISOPROSTOL 200 MCG: 100 TABLET ORAL at 07:05

## 2024-05-28 RX ADMIN — SUCCINYLCHOLINE CHLORIDE 100 MG: 20 INJECTION, SOLUTION INTRAMUSCULAR; INTRAVENOUS; PARENTERAL at 09:05

## 2024-05-28 RX ADMIN — FAMOTIDINE 20 MG: 20 TABLET, FILM COATED ORAL at 07:05

## 2024-05-28 RX ADMIN — FENTANYL CITRATE 100 MCG: 50 INJECTION, SOLUTION INTRAMUSCULAR; INTRAVENOUS at 09:05

## 2024-05-28 RX ADMIN — PROPOFOL 200 MG: 10 INJECTION, EMULSION INTRAVENOUS at 09:05

## 2024-05-28 RX ADMIN — OXYCODONE HYDROCHLORIDE AND ACETAMINOPHEN 2 TABLET: 5; 325 TABLET ORAL at 11:05

## 2024-05-28 RX ADMIN — ACETAMINOPHEN 1000 MG: 500 TABLET ORAL at 07:05

## 2024-05-28 NOTE — ANESTHESIA PROCEDURE NOTES
Intubation    Date/Time: 5/28/2024 9:24 AM    Performed by: Cas Maurer CRNA  Authorized by: Valencia Mcleod MD    Intubation:     Induction:  Intravenous    Intubated:  Postinduction    Mask Ventilation:  Easy mask    Attempts:  1    Attempted By:  Student    Method of Intubation:  Video laryngoscopy    Blade:  Farhad 4    Laryngeal View Grade: Grade I - full view of cords      Difficult Airway Encountered?: No      Complications:  None    Airway Device:  Oral endotracheal tube    Airway Device Size:  7.5    Style/Cuff Inflation:  Cuffed    Inflation Amount (mL):  4    Tube secured:  21    Secured at:  The lips    Placement Verified By:  Capnometry    Complicating Factors:  None    Findings Post-Intubation:  BS equal bilateral

## 2024-05-28 NOTE — ANESTHESIA POSTPROCEDURE EVALUATION
Anesthesia Post Evaluation    Patient: Mary Lou Perez    Procedure(s) Performed: Procedure(s) (LRB):  HYSTEROSCOPY, WITH DILATION AND CURETTAGE OF UTERUS (N/A)    Final Anesthesia Type: general      Patient location during evaluation: PACU  Patient participation: Yes- Able to Participate  Level of consciousness: awake and responds to stimulation  Post-procedure vital signs: reviewed and stable  Pain management: adequate  Airway patency: patent    PONV status at discharge: No PONV  Anesthetic complications: no      Cardiovascular status: blood pressure returned to baseline  Respiratory status: unassisted  Hydration status: euvolemic  Follow-up not needed.          Vitals:    05/28/24 1145 05/28/24 1200 05/28/24 1215 05/28/24 1230   BP:  (!) 143/77  (!) 145/76   Pulse: 67 67 70 68   Resp:  16  15   Temp:    36.8 °C (98.2 °F)   TempSrc:       SpO2: 99% 98% 98% 97%   Weight:            Vitals  Taken Time   BP  05/28/24 1011   Temp  05/28/24 1011   Pulse  05/28/24 1011   Resp  05/28/24 1011   SpO2  05/28/24 1011         No case tracking events are documented in the log.      Pain/Roxana Score: Pain Rating Prior to Med Admin: 0 (5/28/2024  7:35 AM)  Roxana Score: 8 (5/28/2024 10:05 AM)

## 2024-05-28 NOTE — PLAN OF CARE
Problem: Adult Inpatient Plan of Care  Goal: Plan of Care Review  Outcome: Progressing  Goal: Patient-Specific Goal (Individualized)  Outcome: Progressing  Goal: Absence of Hospital-Acquired Illness or Injury  Outcome: Progressing  Goal: Optimal Comfort and Wellbeing  Outcome: Progressing  Goal: Readiness for Transition of Care  Outcome: Progressing     Problem: Bariatric Environmental Safety  Goal: Safety Maintained with Care  Outcome: Progressing     Problem: Infection  Goal: Absence of Infection Signs and Symptoms  Outcome: Progressing

## 2024-05-28 NOTE — TRANSFER OF CARE
Anesthesia Transfer of Care Note    Patient: Mary Lou Perez    Procedure(s) Performed: Procedure(s) (LRB):  HYSTEROSCOPY, WITH DILATION AND CURETTAGE OF UTERUS (N/A)    Patient location: PACU    Anesthesia Type: general    Transport from OR: Transported from OR on room air with adequate spontaneous ventilation    Post assessment: no apparent anesthetic complications    Post vital signs: stable    Level of consciousness: awake    Nausea/Vomiting: no nausea/vomiting    Complications: none    Transfer of care protocol was followed      Last vitals: Visit Vitals  BP (!) 140/72   Pulse 88   Temp 36 °C (96.8 °F) (Temporal)   Resp 20   Wt 106.1 kg (234 lb)   SpO2 100%   Breastfeeding No   BMI 42.80 kg/m²

## 2024-05-28 NOTE — DISCHARGE SUMMARY
[facility] - Periop Services  Brief Operative Note    Surgery Date: 5/28/2024     Surgeons and Role:     * Elzbieta Lares MD - Primary     * Jamilah Scott MD - Resident - Assisting  Assisting Surgeon: None    Pre-op Diagnosis:  Post-menopausal bleeding    Post-op Diagnosis:  Post-Op Diagnosis Codes:     * PMB (postmenopausal bleeding) [N95.0]    Procedure(s) (LRB):  HYSTEROSCOPY, WITH DILATION AND CURETTAGE OF UTERUS (N/A)    Anesthesia: General    Operative Findings:   EUA: Normal external genitalia without lesion. Uterus difficult to palpate 2/2 body habitus, uterus measuring 8 cm on ultrasound. No adnexal masses or fullness. Vaginal vault without lesion, discharge, or bleeding. Cervix visualized without lesion or erythema. Cervix palpated approximately 5-6cm from introitus. Minimal descent noted. Descends to approximately 4 cm proximal to introitus with tenaculum in place.     Hysteroscopy: Cervical canal normal without polyps or lesion. Atrophic phase endometrium visualized globally, with synechiae at fundus and left lateral uterine wall. No polyps, fibroids, or masses identified. Samples taken via curettage. Tubal ostia visualized without lesion.     Estimated Blood Loss: 3 mL    Urine output: 20 mL clear yellow urine    IV Fluids: 300 mL crystalloid    Fluid deficit: 255 mL          Specimens:   Specimen (24h ago, onward)       Start     Ordered    05/28/24 0954  Specimen to Pathology  RELEASE UPON ORDERING        References:    Click here for ordering Quick Tip   Question:  Release to patient  Answer:  Immediate    05/28/24 0954                      Discharge Note    OUTCOME: Patient tolerated treatment/procedure well without complication and is now ready for discharge.    DISPOSITION: Home or Self Care    FINAL DIAGNOSIS: Post-menopausal bleeding    FOLLOWUP: In clinic    DISCHARGE INSTRUCTIONS:    Discharge Procedure Orders   Diet general     Pelvic Rest   Order Comments: Nothing in the  vagina, no sex, for 2 weeks.  No baths/swimming/jacuzzi for 2 weeks.     Call MD for:  temperature >100.4     Call MD for:  severe uncontrolled pain     Call MD for:  persistent dizziness or light-headedness     Call MD for:  extreme fatigue     Call MD for:   Order Comments: Vaginal bleeding similar to or heavier than a period.     Activity as tolerated     Jamilah Scott MD  [facility] - PGY-2  10:24 AM 05/28/2024

## 2024-05-28 NOTE — OP NOTE
[facility]  Operative Note     Surgery Date: 5/28/2024      Surgeons and Role:     * Elzbieta Lares MD - Primary     * Jamilah Scott MD - Resident - Assisting  Assisting Surgeon: None     Pre-op Diagnosis:  Post-menopausal bleeding     Post-op Diagnosis:  Post-Op Diagnosis Codes:     * PMB (postmenopausal bleeding) [N95.0]     Procedure(s) (LRB):  HYSTEROSCOPY, WITH DILATION AND CURETTAGE OF UTERUS (N/A)     Anesthesia: General     Operative Findings:   EUA: Normal external genitalia without lesion. Uterus difficult to palpate 2/2 body habitus, uterus measuring 8 cm on ultrasound. No adnexal masses or fullness. Vaginal vault without lesion, discharge, or bleeding. Cervix visualized without lesion or erythema. Cervix palpated approximately 5-6cm from introitus. Minimal descent noted. Descends to approximately 4 cm proximal to introitus with tenaculum in place.     Hysteroscopy: Cervical canal normal without polyps or lesion. Atrophic phase endometrium visualized globally, with synechiae at fundus and left lateral uterine wall. No polyps, fibroids, or masses identified. Samples taken via curettage. Tubal ostia visualized without lesion.      Estimated Blood Loss: 3 mL     Urine output: 20 mL clear yellow urine     IV Fluids: 300 mL crystalloid     Fluid deficit: 255 mL          Specimens:   Specimen (24h ago, onward)          Start     Ordered     05/28/24 0954   Specimen to Pathology  RELEASE UPON ORDERING        References:    Click here for ordering Quick Tip   Question:  Release to patient  Answer:  Immediate    05/28/24 0905        Technique:   The patient was taken to the operating room with IVF running. SCDs were placed on bilateral lower extremities for DVT prophylaxis. General anesthesia was obtained without difficulty and found to be adequate. She was placed in the dorsal lithotomy position with legs in Hernandez type stirrups. Exam under anesthesia revealed the findings as  above. She was prepped and draped in the normal sterile fashion. A straight catheter was placed to drain the bladder. A timeout was performed.    A weighted speculum was placed into posterior aspect of the vagina. A right angle retractor was placed into the anterior aspect of the vagina. The anterior lip of the cervix was grasped with a single-toothed tenaculum. The cervix was sequentially dilated to 2mm using Hegar dilators. A 4mm hysteroscope was introduced into the cervix with hydrodistension. The above findings were then noted. Photographs were taken of the endometrial cavity. The hysteroscope was then removed. The uterus was curetted in a counter clockwise fashion until a gritty feeling was noted in all aspects of the uterus. The endometrial scrapings were sent to pathology. The tenaculum was removed from the cervix and good hemostasis was noted at puncture sites.    The patient tolerated the procedure well. The instrument and sponge counts were correct times two. The patient awaked from anesthesia and was taken to recovery in stable condition.    Dr. Lares was present for the entire procedure.     Jamilha Scott MD  [facility] - PGY-2  12:09 PM 05/28/2024

## 2024-05-28 NOTE — DISCHARGE INSTRUCTIONS
PLEASE KEEP THESE POST OP INSTRUCTIONS WITH YOU UNTIL YOUR  FOLLOW-UP APPOINTMENT    Keep follow up appointment in [facility] on the 7th Floor.     Pelvic rest: 2 weeks (no baths, soaking, tampons, nothing in vagina, no douches or intercourse).    No heavy lifting/strainin weeks.    You may shower: tomorrow  Light diet today- soup recommended.   Take pain medication as prescribed.  Alternate Tylenol with Ibuprofen and add oxycodone as needed for severe pain, as prescribed only.   If you are experiencing severe pain even with your pain medications, please call the Womens clinic at [phone] to notify your doctor.    Some bleeding/spotting is to be expected for several days.       Notify MD of moderate to severe pain unrelieved by pain medicine or for any signs of infection including fever above 100.4, yellow/green foul-smelling drainage, nausea or vomiting.  Clinics

## 2024-05-28 NOTE — INTERVAL H&P NOTE
H&P Interval Update    Patient seen and evaluated by myself and the staff attending this morning. There have been no changes since her preop visit with Dr. Lares and myself (see below). The patient is able to express in her own words the procedure undergoing on today and wishes to proceed. No changes in PMH/ED visits/Hospital admissions since last visit (H&P noted below). NPO since 2200 yesterday. She is here today with her , who she would like updated after the procedure. Her preferred pharmacy is [facility] on [address] in [place].    To the OR for hysteroscopy D&C for recurrent PMB.    Jamilah Scott MD  [facility] - PGY-2  8:48 AM 05/28/2024

## 2024-05-29 VITALS
WEIGHT: 234 LBS | RESPIRATION RATE: 15 BRPM | HEART RATE: 68 BPM | DIASTOLIC BLOOD PRESSURE: 76 MMHG | OXYGEN SATURATION: 97 % | SYSTOLIC BLOOD PRESSURE: 145 MMHG | BODY MASS INDEX: 42.8 KG/M2 | TEMPERATURE: 98 F

## 2024-06-09 ENCOUNTER — DOCUMENTATION ONLY (OUTPATIENT)
Dept: GYNECOLOGY | Facility: CLINIC | Age: 64
End: 2024-06-09
Payer: MEDICARE

## 2024-06-09 NOTE — PROGRESS NOTES
Pathology Conference          Surgery Date: 5/28/2024     Patient: Mary Lou Perez     Pre-Op Diagnosis: PMB     Procedure: Hysteroscopy D&C     Path:      Endometrium, curettage:      - Scant fragments of benign superficial endometrium.     Plan: Routine post-operative care, proceed with definitive management via hysterectomy for PMB, cardiac clearance needed     Jamilah Scott MD  [facility] - PGY-2  4:04 PM 06/09/2024

## 2024-06-21 ENCOUNTER — TELEPHONE (OUTPATIENT)
Dept: GYNECOLOGY | Facility: CLINIC | Age: 64
End: 2024-06-21

## 2024-06-21 ENCOUNTER — CLINICAL SUPPORT (OUTPATIENT)
Dept: GYNECOLOGY | Facility: CLINIC | Age: 64
End: 2024-06-21
Payer: MEDICARE

## 2024-06-21 DIAGNOSIS — N95.0 PMB (POSTMENOPAUSAL BLEEDING): Primary | ICD-10-CM

## 2024-06-21 NOTE — ASSESSMENT & PLAN NOTE
Continue any current medications.  Activity restrictions: none  Anticipated return to work: Now  Discussed definitive surgical management given 2nd episode of PMB, despite polypectomy during prior HSC. Pt voices understanding and agrees with plan.   Per EUA, pt has minimal descent, will move forward with TLH/BS/Cysto  Pt desires 8/6/2024. Case request in. Preop appt requested  H/o LHC: see notes above. SOB was not of cardiac origin. Workup WNL. She continues to deny NGUYEN, SOB, CP. METS>4. Does not need cardiac clearance

## 2024-06-21 NOTE — PROGRESS NOTES
hospitals OB/GYN CLINIC NOTE  [facility]  [address] [place] [address]  Phone: [phone]  Fax: [phone]    Postoperative Follow-Up    Subjective:      Mary Lou Perez is a 63 y.o.  with PMB now s/p 2nd [facility] D&C workup 3 weeks ago.     Eating a regular diet without*** difficulty with normal*** bowel movements. Minimal lochia. Patient is not complaining of pain currently***. No episodes of vaginal bleeding***.    Patient's medications, allergies, past medical, surgical, social and family histories were reviewed and updated as appropriate.***    Operative Findings:        Review of Systems  Denies fevers, chills, headache, blurry vision, nausea, vomiting, dizziness, or syncope. ***  Denies chest pain, shortness of breath, RUQ pain, or calf pain.***     Objective:   There were no vitals filed for this visit.    Physical Exam: ***    General: alert and oriented, in no acute distress  Lungs: clear to auscultation bilaterally, no conversational dyspnea  Heart: regular rate and rhythm  Abdomen: Soft, non-distended, non tender*** to palpation, no involuntary guarding, no rebound tenderness  Incision Sites: ***clean/dry/intact  Extremities: Normal, atraumatic, non-edematous, No cords or calf tenderness, No significant calf/ankle edema  External genitalia: Normal female genitalia without lesion, discharge or tenderness.*** Normal appearing urethral meatus***. Normal appearing external anus.***  Bimanual Exam: No pelvic lymphadenopathy noted bilaterally. Vagina with adequate capacity***. Uterus 8***cm in size, no cervical motion tenderness. Smooth in contour, no masses. Good descent, mobile. ***. No adnexal fullness/tenderness. Normal urethra. Normal bladder  Speculum Exam: Vaginal mucosa normal in appearance.*** Pink. No masses/lesions. Cervix well visualized, smooth in contour no masses or lesions***. Os normal in appearance, no blood or discharge coming from the os    Note: RN chaperone present for  entirety of exam.    Pathology:  Endometrium, curettage:   - Scant fragments of benign superficial endometrium.      Assessment:     Mary Lou Perez is a 63 y.o. G***P*** s/p *** 2/2 *** on ***. Doing well*** post-operatively.  Operative findings again reviewed. Pathology report discussed.***     Plan:     Continue any current medications.  Wound care discussed.  Activity restrictions: ***  Anticipated return to work: Now  Follow up: *** weeks.    Patient and plan were discussed with  ***.    ***

## 2024-06-21 NOTE — PROGRESS NOTES
[facility] Progress Note    Primary Site: [facility]  Patient location: Home  Physician location: In office    Chief Complaint: Postop Follow up. 2 weeks s/p [facility] D&C for PMB    HPI  Mary Lou Perez joined me via our telemedicine platform. PMH HTN, RA, and Grave's disease s/p thyroid lobectomy now with hypothyroidism      Eating a regular diet without difficulty with normal bowel movements. Denies N/V. Voiding spontenousyl. Pt has no pain. No episodes of vaginal bleeding.    I have reviewed family history, social history, medications and allergies as documented in the patient's electronic medical record.    Reports, labs, outside records, and images have been reviewed with pertinent interpretations below. See telemedicine notes records for intervening communications.    Operative Findings:  EUA: Normal external genitalia without lesion. Uterus difficult to palpate 2/2 body habitus, uterus measuring 8 cm on ultrasound. No adnexal masses or fullness. Vaginal vault without lesion, discharge, or bleeding. Cervix visualized without lesion or erythema. Cervix palpated approximately 5-6cm from introitus. Minimal descent noted. Descends to approximately 4 cm proximal to introitus with tenaculum in place.     Hysteroscopy: Cervical canal normal without polyps or lesion. Atrophic phase endometrium visualized globally, with synechiae at fundus and left lateral uterine wall. No polyps, fibroids, or masses identified. Samples taken via curettage. Tubal ostia visualized without lesion.     Pathology:  Endometrium, curettage:   - Scant fragments of benign superficial endometrium.      H/o LHC:  -Performed during workup for SOB thought to be cardiac origin. Found mediastinal mass, now s/p resection (surgical hypothyroidism), and followed by [facility]. Last seen cardiologist on 09/30/2020  Lexiscan stress test on 06/24/2020 WNL  Echo done on 08/07/2020 shows LVEF 55 to 60%  Denies any chest pain or SOB at this time        Assessment/Plan  Problem List Items Addressed This Visit          Renal/    PMB (postmenopausal bleeding) - Primary     Continue any current medications.  Activity restrictions: none  Anticipated return to work: Now  Discussed definitive surgical management given 2nd episode of PMB, despite polypectomy during prior HSC. Pt voices understanding and agrees with plan.   Per EUA, pt has minimal descent, will move forward with TLH/BS/Cysto  Pt desires 8/6/2024. Case request in. Preop appt requested  H/o LHC: see notes above. SOB was not of cardiac origin. Workup WNL. She continues to deny NGUYEN, SOB, CP. METS>4. Does not need cardiac clearance         Relevant Orders    Case Request Operating Room: HYSTERECTOMY, TOTAL, LAPAROSCOPIC, CYSTOSCOPY (Completed)     To PreOp Clinic for TLH/BS/Cysto    See correspondence above for plan.   Patient's needs assessed and health education provided. Patient understands risks, benefits, and alternatives of treatment prescribed above. Patient verbalizes understanding and agrees to follow plan.    Patient's identity was confirmed and confidentiality/privacy confirmed prior to visit. I certify that this visit was done via secure two-way transmission with informed consent of the patient and/or guardian.  Each patient to whom I provide medical services by telemedicine is:  (1) informed of the relationship between the physician and patient and the respective role of any other health care provider with respect to management of the patient; and (2) notified that they may decline to receive medical services by telemedicine and may withdraw from such care at any time. Patient verbally consented to receive this service via voice-only telephone call.    Audio only was selected 2/2 patient preference      20 mins of the time was counseling or coordinating care.    Discussed with Dr. Provost Diego Drake MD  [facility]-PGY3  06/21/2024 1:18 PM

## 2024-06-21 NOTE — TELEPHONE ENCOUNTER
Pre-Op is set for 7/31/24        ----- Message from Diego Drake MD sent at 6/21/2024  1:48 PM CDT -----  [facility] 8/6! Thanks

## 2024-07-22 ENCOUNTER — TELEPHONE (OUTPATIENT)
Dept: INTERNAL MEDICINE | Facility: CLINIC | Age: 64
End: 2024-07-22

## 2024-07-22 NOTE — TELEPHONE ENCOUNTER
DR. FOSTER,       MS. GARDINER IS ASKING THAT WE SEND SOMETHING IN FOR A COUGH AND SORE THROAT.  I ADVISED HER TO GO TO THE NEAREST ER OR [facility] FOR EVALUATION AND TREATMENT.    
Statement Selected

## 2024-08-01 ENCOUNTER — TELEPHONE (OUTPATIENT)
Dept: GYNECOLOGY | Facility: CLINIC | Age: 64
End: 2024-08-01
Payer: MEDICARE

## 2024-08-01 NOTE — TELEPHONE ENCOUNTER
MD contacted patient after she missed her preoperative appointment. Patient state she has URI, was ill yesterday, and she is still interested in proceeding with hysterectomy once she is better. Will plan for later date.     Lakshmi Cedeno MD  [facility], PGY-3

## 2024-08-03 ENCOUNTER — TELEPHONE (OUTPATIENT)
Dept: GYNECOLOGY | Facility: CLINIC | Age: 64
End: 2024-08-03
Payer: MEDICARE

## 2024-08-10 ENCOUNTER — TELEPHONE (OUTPATIENT)
Dept: GYNECOLOGY | Facility: CLINIC | Age: 64
End: 2024-08-10
Payer: MEDICARE

## 2024-08-10 NOTE — TELEPHONE ENCOUNTER
MD attempted to contact patient to discuss scheduling for surgery, no answer.   Left voicemail.  Will try again at later time.     Lakshmi Cedeno MD  [facility], PGY-3

## 2024-08-14 ENCOUNTER — PATIENT MESSAGE (OUTPATIENT)
Dept: GASTROENTEROLOGY | Facility: CLINIC | Age: 64
End: 2024-08-14
Payer: MEDICARE

## 2024-08-17 ENCOUNTER — TELEPHONE (OUTPATIENT)
Dept: GYNECOLOGY | Facility: CLINIC | Age: 64
End: 2024-08-17
Payer: MEDICARE

## 2024-08-17 NOTE — TELEPHONE ENCOUNTER
MD contacted patient to discuss hysterectomy.   Patient now feeling better after URI.   Today patient states she is not sure she wants to proceed with hysterectomy, would like more time ton consider when good time would be. Patient denies any further episodes of PMB. Patient advised to contact clinic immediately if bleeding recurs. Will schedule patient for telehealth visit to [facility] in a few months.     Lakshmi Cedeno MD  [facility], PGY-3

## 2024-08-19 ENCOUNTER — TELEPHONE (OUTPATIENT)
Dept: GYNECOLOGY | Facility: CLINIC | Age: 64
End: 2024-08-19
Payer: MEDICARE

## 2024-08-19 NOTE — TELEPHONE ENCOUNTER
12/22/20 1204   Reason for Consult   Reason for Consult Anxiety;Diagnosis/procedure education;Distraction;Initial assessment   Anxiety Related to Procedure   Diagnosis/Procedure Surgery;Procedural   Patient Intervention(s)   Type of Intervention Performed Normalizing and coping;Preparation;Procedural support   Normalizing and coping intervention(s) Activities to promote developmental play;Implement coping plan   Diagnosis/Procedure education Introduction of relaxation/distraction techniques;Pre-procedure teaching for patient/family - individually   Procedural Support Intervention(s) Distraction;Verbal reassurance;Sensory information;Rehearsal of coping   CCLS introduced self and services to pt and mother in Palestinian. Pt tearful in anticipation for IV placement. CCLS provided preparation using honest expectations and coping skills in Palestinian. CCLS provided iPad for distraction and coached pt through deep breathing. Pt cried appropriately but held still and cooperated. Pt recovered well engaging in stickers and coloring. Pt has no further questions or concerns about surgery. Child life will follow up should further needs arise.    Please make this lady a telemed appointment for 6 months.  Reason:   F/U she decided against TL

## 2024-09-04 ENCOUNTER — TELEPHONE (OUTPATIENT)
Dept: ENDOSCOPY | Facility: HOSPITAL | Age: 64
End: 2024-09-04
Payer: MEDICARE

## 2024-09-04 NOTE — TELEPHONE ENCOUNTER
"Called patient to check on complaints of chest pain reported during 2nd attempt 2wk pre-op call interview. Patient states, " She has some discomfort rating about a 5 on pain scale 0-10." Instructed patient to have  bring her to nearest ED or call 911. Patient states," She will have her  bring her to ED."  Instructed patient EGD procedure scheduled 09/18/2024 will be postponed. Pt verbalized understanding.MS  "

## 2024-09-17 ENCOUNTER — HOSPITAL ENCOUNTER (OUTPATIENT)
Dept: RADIOLOGY | Facility: HOSPITAL | Age: 64
Discharge: HOME | End: 2024-09-17
Attending: STUDENT IN AN ORGANIZED HEALTH CARE EDUCATION/TRAINING PROGRAM
Payer: MEDICARE

## 2024-09-17 DIAGNOSIS — Z12.31 BREAST CANCER SCREENING BY MAMMOGRAM: ICD-10-CM

## 2024-09-17 DIAGNOSIS — M06.9 RHEUMATOID ARTHRITIS, INVOLVING UNSPECIFIED SITE, UNSPECIFIED WHETHER RHEUMATOID FACTOR PRESENT: ICD-10-CM

## 2024-09-17 PROCEDURE — 77063 BREAST TOMOSYNTHESIS BI: CPT | Mod: 26,,, | Performed by: RADIOLOGY

## 2024-09-17 PROCEDURE — 77063 BREAST TOMOSYNTHESIS BI: CPT | Mod: TC

## 2024-09-17 PROCEDURE — 77067 SCR MAMMO BI INCL CAD: CPT | Mod: 26,,, | Performed by: RADIOLOGY

## 2024-09-23 ENCOUNTER — TELEPHONE (OUTPATIENT)
Dept: INTERNAL MEDICINE | Facility: CLINIC | Age: 64
End: 2024-09-23
Payer: MEDICARE

## 2024-09-23 NOTE — TELEPHONE ENCOUNTER
Spoke with patient, regarding mammogram results, normal per Dr Chin, verbalized understanding and pleased.

## 2024-09-23 NOTE — TELEPHONE ENCOUNTER
----- Message from Latonia Antoine LPN sent at 9/23/2024  9:11 AM CDT -----    ----- Message -----  From: Mike Emanuel MD  Sent: 9/20/2024  11:17 PM CDT  To: Joselito Judge let patient know her mammo is normal, repeat in 1 year  ----- Message -----  From: Interface, Rad Results In  Sent: 9/18/2024   7:34 AM CDT  To: Laura Chin DO

## 2024-09-27 ENCOUNTER — TELEPHONE (OUTPATIENT)
Dept: INTERNAL MEDICINE | Facility: CLINIC | Age: 64
End: 2024-09-27
Payer: MEDICARE

## 2024-09-27 DIAGNOSIS — R07.9 CHEST PAIN, UNSPECIFIED TYPE: Primary | ICD-10-CM

## 2024-09-27 NOTE — TELEPHONE ENCOUNTER
Pt called stating she need a referral sent to Cardiologist because she supposed to have a procedure done 9/18/24  but was having chest pains but they cancelled procedure until she is evaluated by a Cardiologist. Pt requesting an urgent call back at [phone] or [phone]

## 2024-09-27 NOTE — TELEPHONE ENCOUNTER
Spoke with patient regarding concerns with cardiology referral, patient states she does have chest pains that comes and go, she used to see [facility] years ago, and does have nitro prn. Patient is requesting a referral also, not scheduled until nov with pcp, also informed patient to seek medical attention with chest pains to be further evaluated and treated to see if its heart or reflux related, patient verbalized understanding and pleased.

## 2024-09-30 NOTE — TELEPHONE ENCOUNTER
Spoke with patient and informed of referral to cardiology done today per Dr Chin, also reinforced ED precautions, patient verbalized understanding and pleased.

## 2024-10-20 RX ORDER — LEVOTHYROXINE SODIUM 125 UG/1
125 TABLET ORAL DAILY
Qty: 90 TABLET | Refills: 2 | Status: SHIPPED | OUTPATIENT
Start: 2024-10-20

## 2024-11-07 ENCOUNTER — TELEPHONE (OUTPATIENT)
Dept: INTERNAL MEDICINE | Facility: CLINIC | Age: 64
End: 2024-11-07
Payer: MEDICARE

## 2024-11-07 NOTE — TELEPHONE ENCOUNTER
----- Message from Nanda sent at 11/7/2024 12:48 PM CST -----  Regarding: Dr. Chin-Order Neede  Good afternoon, patient has called in requesting an order for her annual lung cancer screening. The last one was done on 08/04/23. Thank you

## 2024-11-15 ENCOUNTER — OFFICE VISIT (OUTPATIENT)
Dept: CARDIOLOGY | Facility: CLINIC | Age: 64
End: 2024-11-15
Payer: MEDICARE

## 2024-11-15 VITALS
BODY MASS INDEX: 42.51 KG/M2 | WEIGHT: 231 LBS | HEIGHT: 62 IN | RESPIRATION RATE: 18 BRPM | SYSTOLIC BLOOD PRESSURE: 141 MMHG | HEART RATE: 81 BPM | DIASTOLIC BLOOD PRESSURE: 88 MMHG | TEMPERATURE: 99 F | OXYGEN SATURATION: 98 %

## 2024-11-15 DIAGNOSIS — I10 HYPERTENSION, UNSPECIFIED TYPE: Primary | ICD-10-CM

## 2024-11-15 DIAGNOSIS — R07.9 CHEST PAIN, UNSPECIFIED TYPE: ICD-10-CM

## 2024-11-15 LAB
OHS QRS DURATION: 82 MS
OHS QTC CALCULATION: 452 MS

## 2024-11-15 PROCEDURE — 99215 OFFICE O/P EST HI 40 MIN: CPT | Mod: PBBFAC,25 | Performed by: INTERNAL MEDICINE

## 2024-11-15 PROCEDURE — 93005 ELECTROCARDIOGRAM TRACING: CPT

## 2024-11-15 RX ORDER — TIZANIDINE 4 MG/1
TABLET ORAL
COMMUNITY
Start: 2024-09-07

## 2024-11-15 RX ORDER — PREDNISONE 5 MG/1
5 TABLET ORAL DAILY PRN
COMMUNITY
Start: 2024-10-01

## 2024-11-15 RX ORDER — ZOLPIDEM TARTRATE 10 MG/1
10 TABLET ORAL NIGHTLY PRN
COMMUNITY
Start: 2024-10-01

## 2024-11-15 NOTE — PATIENT INSTRUCTIONS
3 month follow up w/ echocardiogram, nuclear stress test, and 30 day heart monitor to be scheduled    Monitor bp at home 2 hours after medicine and report readings to clinic in 2 weeks

## 2024-11-15 NOTE — PROGRESS NOTES
"Cardiology Attending  11/15/2024 3:53 PM    I have discussed Mary Lou Perez including the patient's symptoms, findings, and management plan with the cardiology fellow.  Ms. Mary Lou Perez is a 63 y.o. female.  The patient is seen in cardiology clinic for chest pain.       Blood pressure (!) 141/88, pulse 81, temperature 98.8 °F (37.1 °C), temperature source Oral, resp. rate 18, height 5' 2" (1.575 m), weight 104.8 kg (231 lb), SpO2 98%.  The patient has intermittent CP.  CP occurs sometimes when she exerts, but not each time when she exerts.      Lab Results   Component Value Date    CHOL 146 04/07/2022      Lab Results   Component Value Date    HGB 13.3 05/23/2024      Lab Results   Component Value Date    CREATININE 0.85 05/23/2024      No results found for: "BNP"       The plan is to evaluate with stress test, echo, and monitor     Reginaldo García MD   "

## 2024-11-15 NOTE — PROGRESS NOTES
CHIEF COMPLAINT:   Chief Complaint   Patient presents with    New referral for CP      Last episode of chest pain one week ago, dizziness/lightheadedness, fuzzy in head, palpitations, ongoing SOB,leg pain and swelling                    Review of patient's allergies indicates:  No Known Allergies                                       HPI:  Mary Lou Perez 63 y.o. female with pmh HTN, RA, Graves disease s/p lobectomy and hypothyroidism,GERD previously seen by [facility] once in 2019 that presents to Cardiology clinic for evaluation of CP and reports need for pre-op clearance prior to hysterectomy as well.     Patient reports hx of CHF, however, per records reviewed, no evidence of HFrEF. Patient was seen by [facility] 2019 and TTE performed at that time showed low normal LVEF 50-55%, G1DD and mild MR/TR. Patient reports h/o coronary angiogram without PCI/stenting, however, no records seen on [facility] records. Patient possibly had LHC/Cors performed by other Cardiology group.      Patient reports long-standing hx of intermittent palpitation episodes that last 1-10 minutes and  are associated with lightheadedness, dizziness, fatigue, chest tightness. She denies any N/V or diaphoresis. She reports intermittent LE edema and orthopnea as well. She denies any known h/o irregular heart rhythms and has never been on full dose anticoagulation. Patient states she can perform her ADLs ( mop, sweep, vacuum) but cannot climb 1 flight of stairs and states she does not walk enough to know how far she would be able to go before becoming SOB.     She denies any tobacco ETOH or drug use.                                                                                                                                                                                                                                                                                                                                                                                                                                                                                         CARDIAC TESTING:  No results found for this or any previous visit.    No results found for this or any previous visit.     No results found for this or any previous visit.       Patient Active Problem List   Diagnosis    Caries    Fibromyalgia    Insomnia    Seropositive rheumatoid arthritis    GERD (gastroesophageal reflux disease)    Screening for colon cancer    Chronic constipation    Arthritis    Thymoma    H. pylori infection    PMB (postmenopausal bleeding)    Cough    Status post hysteroscopy     Past Surgical History:   Procedure Laterality Date    BILATERAL TUBAL LIGATION      BREAST BIOPSY      CARDIAC CATHETERIZATION      DILATION AND CURETTAGE OF UTERUS      ESOPHAGOGASTRODUODENOSCOPY      HYSTEROSCOPY WITH DILATION AND CURETTAGE OF UTERUS N/A 5/28/2024    Procedure: HYSTEROSCOPY, WITH DILATION AND CURETTAGE OF UTERUS;  Surgeon: Elzbieta Lares MD;  Location: [facility];  Service: OB/GYN;  Laterality: N/A;  *babyscope*    MEDIASTINAL MASS EXCISION      MOUTH SURGERY      had bottom teeth pulled    MULTIPLE TOOTH EXTRACTIONS      All teeth now removed    THYROIDECTOMY, PARTIAL       Social History     Socioeconomic History    Marital status:      Spouse name: Rolando    Number of children: 4   Occupational History    Occupation: disabled   Tobacco Use    Smoking status: Never    Smokeless tobacco: Never   Substance and Sexual Activity    Alcohol use: Not Currently     Alcohol/week: 1.0 standard drink of alcohol     Types: 1 Glasses of wine per week     Comment: ocassionally    Drug use: Never    Sexual activity: Not Currently     Partners: Male     Birth control/protection: None     Social Drivers of Health     Financial Resource Strain: Low Risk  (5/23/2024)    Overall Financial Resource Strain (CARDIA)     Difficulty of Paying Living Expenses: Not hard at all   Food Insecurity: No  Food Insecurity (5/23/2024)    Hunger Vital Sign     Worried About Running Out of Food in the Last Year: Never true     Ran Out of Food in the Last Year: Never true   Transportation Needs: No Transportation Needs (5/23/2024)    TRANSPORTATION NEEDS     Transportation : No   Physical Activity: Inactive (5/23/2024)    Exercise Vital Sign     Days of Exercise per Week: 0 days     Minutes of Exercise per Session: 0 min   Stress: No Stress Concern Present (5/23/2024)    [employer] - Occupational Stress Questionnaire     Feeling of Stress : Not at all   Housing Stability: Low Risk  (6/23/2022)    Housing Stability Vital Sign     Unable to Pay for Housing in the Last Year: No     Number of Places Lived in the Last Year: 1     Unstable Housing in the Last Year: No        Family History   Problem Relation Name Age of Onset    Hypertension Mother      Diabetes Mother      Breast cancer Mother  45        bilateral    Lung cancer Father      Diabetes Daughter      Cervical cancer Daughter  27    Lung cancer Maternal Uncle           Current Outpatient Medications:     albuterol (PROVENTIL/VENTOLIN HFA) 90 mcg/actuation inhaler, Inhale 2 puffs into the lungs every 6 (six) hours as needed for Shortness of Breath., Disp: 18 g, Rfl: 2    ascorbic acid, vitamin C, (VITAMIN C) 500 MG tablet, Take 500 mg by mouth once daily., Disp: , Rfl:     cyanocobalamin, vitamin B-12, (VITAMIN B-12) 2,500 mcg Subl, Place 1 tablet under the tongue Daily., Disp: 90 each, Rfl: 3    fluticasone propionate (FLONASE) 50 mcg/actuation nasal spray, 1 spray by Each Nostril route Daily., Disp: , Rfl:     fluticasone-salmeterol diskus inhaler 500-50 mcg, Inhale 1 puff into the lungs 2 (two) times daily. Controller, Disp: 60 each, Rfl: 2    folic acid (FOLVITE) 1 MG tablet, Take 1 tablet (1,000 mcg total) by mouth once daily., Disp: 90 tablet, Rfl: 3    gabapentin (NEURONTIN) 400 MG capsule, Take 400 mg by mouth 3 (three) times  daily., Disp: , Rfl:     HUMIRA,CF, PEN 40 mg/0.4 mL PnKt, Inject 0.4 mLs (40 mg total) into the skin once a week., Disp: 4 pen, Rfl: 11    HYDROcodone-acetaminophen (NORCO)  mg per tablet, Take 1 tablet by mouth every 8 (eight) hours as needed for Pain., Disp: 90 tablet, Rfl: 0    hydroxychloroquine (PLAQUENIL) 200 mg tablet, Take 1 tablet (200 mg total) by mouth 2 (two) times daily., Disp: 180 tablet, Rfl: 3    levothyroxine (SYNTHROID) 125 MCG tablet, Take 1 tablet (125 mcg total) by mouth once daily., Disp: 90 tablet, Rfl: 2    methotrexate 2.5 MG Tab, TAKE 3 TABLETS BY MOUTH AFTER LUNCH AND TAKE 3 TABLETS AFTER SUPPER ON TUESDAY S, Disp: 90 tablet, Rfl: 3    nitroGLYCERIN (NITROSTAT) 0.4 MG SL tablet, , Disp: , Rfl:     omeprazole (PRILOSEC) 40 MG capsule, Take 1 capsule (40 mg total) by mouth every morning., Disp: 90 capsule, Rfl: 3    predniSONE (DELTASONE) 5 MG tablet, Take 5 mg by mouth daily as needed., Disp: , Rfl:     tiZANidine (ZANAFLEX) 4 MG tablet, TAKE 2 TABLETS BY MOUTH ONCE DAILY AT BEDTIME, Disp: , Rfl:     triamterene-hydrochlorothiazide 37.5-25 mg (MAXZIDE-25) 37.5-25 mg per tablet, Take 1 tablet by mouth once daily., Disp: 90 tablet, Rfl: 2    valACYclovir (VALTREX) 500 MG tablet, Take 1 tablet (500 mg total) by mouth every evening., Disp: 30 tablet, Rfl: 11    zolpidem (AMBIEN) 10 mg Tab, Take 10 mg by mouth nightly as needed., Disp: , Rfl:     acetaminophen (TYLENOL) 500 MG tablet, Take 2 tablets (1,000 mg total) by mouth every 8 (eight) hours. (Patient not taking: Reported on 11/15/2024), Disp: 30 tablet, Rfl: 0    meclizine (ANTIVERT) 25 mg tablet, Take 1 tablet (25 mg total) by mouth 3 (three) times daily as needed. (Patient not taking: Reported on 11/15/2024), Disp: 30 tablet, Rfl: 2    montelukast (SINGULAIR) 10 mg tablet, Take 10 mg by mouth once daily. Need refill (Patient not taking: Reported on 11/15/2024), Disp: , Rfl:      ROS:                                                  "                                                                                                                            As above    Blood pressure (!) 141/88, pulse 81, temperature 98.8 °F (37.1 °C), temperature source Oral, resp. rate 18, height 5' 2" (1.575 m), weight 104.8 kg (231 lb), SpO2 98%.     PE:  General: Awake, alert, & oriented to person, place & time. No acute distress  Psychiatric: Mood and affect normal  HEENT: Normocephalic, atraumatic. Symmetric  Cardiovascular: Regular rate & rhythm, Normal S1 & S2 w/out murmurs, rubs or gallops, No JVD appreciated, 2+ pulses throughout  Pulmonary: Bilateral symmetric chest rise, Non-labored, no wheezes, rhonchi or crackles are appreciated  Abdominal:  Soft and nondistended  Extremities: No clubbing, cyanosis. trace B/L Le edema.  Skin:  Exposed skin is warm & dry.  Neuro:   Patient moves all extremities equally. Sensation intact bilateraly.           ASSESSMENT/PLAN:      CP  Palpitations  Self reported CAD  Self reported h/o CHF  HTN  HLD     -BP slightly elevated. Advised to keep BP log and can start medications pending TTE  -Obtain TTE to assess structure and function given LE edema  -Obtain NST as part of cardiac risk assessment given inability to accurately assess functional status and c/o NGUYEN/CP    -30 day monitor to assess for arrhyhtmia/blocks/pauses  -F/U 3 months  "

## 2024-11-19 RX ORDER — TRIAMTERENE/HYDROCHLOROTHIAZID 37.5-25 MG
1 TABLET ORAL DAILY
Qty: 90 TABLET | Refills: 2 | Status: SHIPPED | OUTPATIENT
Start: 2024-11-19

## 2024-12-04 ENCOUNTER — HOSPITAL ENCOUNTER (OUTPATIENT)
Dept: CARDIOLOGY | Facility: HOSPITAL | Age: 64
Discharge: HOME | End: 2024-12-04
Attending: STUDENT IN AN ORGANIZED HEALTH CARE EDUCATION/TRAINING PROGRAM
Payer: MEDICARE

## 2024-12-04 DIAGNOSIS — R07.9 CHEST PAIN, UNSPECIFIED TYPE: ICD-10-CM

## 2024-12-04 DIAGNOSIS — I10 HYPERTENSION, UNSPECIFIED TYPE: ICD-10-CM

## 2024-12-11 ENCOUNTER — CLINICAL SUPPORT (OUTPATIENT)
Dept: GYNECOLOGY | Facility: CLINIC | Age: 64
End: 2024-12-11
Payer: MEDICARE

## 2024-12-11 DIAGNOSIS — N95.0 PMB (POSTMENOPAUSAL BLEEDING): Primary | ICD-10-CM

## 2024-12-11 NOTE — PROGRESS NOTES
"[facility] Progress Note    Primary Site: [facility]  Patient location: Home  Physician location: In office    Chief Complaint: Follow up of postmenopausal bleeding     HPI  Mary Lou Perez joined me via our telemedicine platform.    Briefly patient underwent hysteroscopy D&C in 5/2024 for PMB. Pathology significant for: Scant fragments of benign superficial endometrium. Plan was for management via hysterectomy for recurrent PMB (2 episodes). However in 8/2024 patient missed her pre-op appointment for her hysterectomy, and reported that she was no longer interested in a hysterectomy.     Today she reports she has not had any further episodes of postmenopausal bleeding and continues to not desire hysterectomy. She also reports she is having a lot of "heart issues" right now for which she is being worked up by cardiology. She is aware to call the clinic with any further episodes of bleeding.    I have reviewed family history, social history, medications and allergies as documented in the patient's electronic medical record.    Reports, labs, outside records, and images have been reviewed with pertinent interpretations below. See telemedicine notes records for intervening communications.    Assessment/Plan  Problem List Items Addressed This Visit       PMB (postmenopausal bleeding) - Primary     -S/p [facility] D&C 5/2024 with benign pathology.   -Previous pathology report and lab work independently reviewed   -Plan was for definitive surgical management in the setting of recurrent PMB (2 episodes)  -Patient denies any further episodes of PMB  -Continues to not desire hysterectomy   -Patient aware to call the clinic with any further episodes of bleeding          Follow up: 1yr for annual WWE or PRN    See correspondence above for plan.   Patient's needs assessed and health education provided. Patient understands risks, benefits, and alternatives of treatment prescribed above. Patient verbalizes understanding " and agrees to follow plan.    Patient's identity was confirmed and confidentiality/privacy confirmed prior to visit. I certify that this visit was done via secure two-way transmission with informed consent of the patient and/or guardian.  Each patient to whom I provide medical services by telemedicine is:  (1) informed of the relationship between the physician and patient and the respective role of any other health care provider with respect to management of the patient; and (2) notified that they may decline to receive medical services by telemedicine and may withdraw from such care at any time. Patient verbally consented to receive this service via voice-only telephone call.    Audio only was selected because there was no capability for video conferencing with patient.      Gloria López MD  [employer] [facility] PGY-3  12/11/2024

## 2024-12-12 NOTE — ASSESSMENT & PLAN NOTE
-[place] [facility] [place] with benign pathology.   -Previous pathology report and lab work independently reviewed   -Plan was for definitive surgical management in the setting of recurrent PMB (2 episodes)  -Patient denies any further episodes of PMB  -Continues to not desire hysterectomy   -Patient aware to call the clinic with any further episodes of bleeding

## 2025-01-15 RX ORDER — VALACYCLOVIR HYDROCHLORIDE 500 MG/1
500 TABLET, FILM COATED ORAL NIGHTLY
Qty: 30 TABLET | Refills: 11 | Status: SHIPPED | OUTPATIENT
Start: 2025-01-15 | End: 2026-01-15

## 2025-01-16 ENCOUNTER — HOSPITAL ENCOUNTER (OUTPATIENT)
Dept: CARDIOLOGY | Facility: HOSPITAL | Age: 65
Discharge: HOME | End: 2025-01-16
Attending: STUDENT IN AN ORGANIZED HEALTH CARE EDUCATION/TRAINING PROGRAM
Payer: MEDICARE

## 2025-01-16 VITALS
DIASTOLIC BLOOD PRESSURE: 86 MMHG | BODY MASS INDEX: 42.51 KG/M2 | HEIGHT: 62 IN | SYSTOLIC BLOOD PRESSURE: 138 MMHG | WEIGHT: 231 LBS

## 2025-01-16 DIAGNOSIS — I10 HYPERTENSION, UNSPECIFIED TYPE: ICD-10-CM

## 2025-01-16 DIAGNOSIS — R07.9 CHEST PAIN, UNSPECIFIED TYPE: ICD-10-CM

## 2025-01-16 PROCEDURE — 93306 TTE W/DOPPLER COMPLETE: CPT

## 2025-01-24 LAB
APICAL FOUR CHAMBER EJECTION FRACTION: 53 %
APICAL TWO CHAMBER EJECTION FRACTION: 53 %
AV INDEX (PROSTH): 0.75
AV MEAN GRADIENT: 4 MMHG
AV PEAK GRADIENT: 7 MMHG
AV VALVE AREA BY VELOCITY RATIO: 1.9 CM²
AV VALVE AREA: 2.4 CM²
AV VELOCITY RATIO: 0.62
BSA FOR ECHO PROCEDURE: 2.14 M2
CV ECHO LV RWT: 0.48 CM
DOP CALC AO PEAK VEL: 1.3 M/S
DOP CALC AO VTI: 29.4 CM
DOP CALC LVOT AREA: 3.1 CM2
DOP CALC LVOT DIAMETER: 2 CM
DOP CALC LVOT PEAK VEL: 0.8 M/S
DOP CALC LVOT STROKE VOLUME: 69.4 CM3
DOP CALC MV VTI: 16.2 CM
DOP CALCLVOT PEAK VEL VTI: 22.1 CM
E WAVE DECELERATION TIME: 343 MSEC
E/A RATIO: 0.58
E/E' RATIO: 7 M/S
ECHO LV POSTERIOR WALL: 1 CM (ref 0.6–1.1)
FRACTIONAL SHORTENING: 35.7 % (ref 28–44)
HR MV ECHO: 65 BPM
INTERVENTRICULAR SEPTUM: 1.1 CM (ref 0.6–1.1)
IVC DIAMETER: 1.6 CM
LEFT ATRIUM AREA SYSTOLIC (APICAL 2 CHAMBER): 14.27 CM2
LEFT ATRIUM AREA SYSTOLIC (APICAL 4 CHAMBER): 10.92 CM2
LEFT ATRIUM SIZE: 3.6 CM
LEFT ATRIUM VOLUME INDEX MOD: 14 ML/M2
LEFT ATRIUM VOLUME MOD: 28 ML
LEFT INTERNAL DIMENSION IN SYSTOLE: 2.7 CM (ref 2.1–4)
LEFT VENTRICLE DIASTOLIC VOLUME INDEX: 38.67 ML/M2
LEFT VENTRICLE DIASTOLIC VOLUME: 78.5 ML
LEFT VENTRICLE END DIASTOLIC VOLUME APICAL 2 CHAMBER: 67.36 ML
LEFT VENTRICLE END DIASTOLIC VOLUME APICAL 4 CHAMBER: 80.17 ML
LEFT VENTRICLE END SYSTOLIC VOLUME APICAL 2 CHAMBER: 38.36 ML
LEFT VENTRICLE END SYSTOLIC VOLUME APICAL 4 CHAMBER: 20.59 ML
LEFT VENTRICLE MASS INDEX: 72.4 G/M2
LEFT VENTRICLE SYSTOLIC VOLUME INDEX: 13.8 ML/M2
LEFT VENTRICLE SYSTOLIC VOLUME: 28.01 ML
LEFT VENTRICULAR INTERNAL DIMENSION IN DIASTOLE: 4.2 CM (ref 3.5–6)
LEFT VENTRICULAR MASS: 147 G
LV LATERAL E/E' RATIO: 6.2 M/S
LV SEPTAL E/E' RATIO: 7.4 M/S
LVED V (TEICH): 78.5 ML
LVES V (TEICH): 28.01 ML
LVOT MG: 1.54 MMHG
LVOT MV: 0.58 CM/S
MV MEAN GRADIENT: 1 MMHG
MV PEAK A VEL: 0.64 M/S
MV PEAK E VEL: 0.37 M/S
MV PEAK GRADIENT: 2 MMHG
MV VALVE AREA BY CONTINUITY EQUATION: 4.28 CM2
OHS LV EJECTION FRACTION SIMPSONS BIPLANE MOD: 53 %
PISA TR MAX VEL: 2.3 M/S
RA MAJOR: 3.89 CM
RA PRESSURE ESTIMATED: 8 MMHG
RV TB RVSP: 10 MMHG
SINUS: 3 CM
TDI LATERAL: 0.06 M/S
TDI SEPTAL: 0.05 M/S
TDI: 0.06 M/S
TR MAX PG: 21 MMHG
TRICUSPID ANNULAR PLANE SYSTOLIC EXCURSION: 2.01 CM
TV REST PULMONARY ARTERY PRESSURE: 29 MMHG
Z-SCORE OF LEFT VENTRICULAR DIMENSION IN END DIASTOLE: -3.59
Z-SCORE OF LEFT VENTRICULAR DIMENSION IN END SYSTOLE: -2.46

## 2025-02-14 ENCOUNTER — OFFICE VISIT (OUTPATIENT)
Dept: CARDIOLOGY | Facility: CLINIC | Age: 65
End: 2025-02-14
Payer: MEDICAID

## 2025-02-14 VITALS
HEART RATE: 86 BPM | TEMPERATURE: 98 F | WEIGHT: 231.19 LBS | RESPIRATION RATE: 18 BRPM | HEIGHT: 62 IN | BODY MASS INDEX: 42.55 KG/M2 | SYSTOLIC BLOOD PRESSURE: 130 MMHG | OXYGEN SATURATION: 97 % | DIASTOLIC BLOOD PRESSURE: 82 MMHG

## 2025-02-14 DIAGNOSIS — R07.9 CHEST PAIN, UNSPECIFIED TYPE: Primary | ICD-10-CM

## 2025-02-14 DIAGNOSIS — E66.01 MORBID OBESITY: ICD-10-CM

## 2025-02-14 DIAGNOSIS — R53.83 FATIGUE, UNSPECIFIED TYPE: ICD-10-CM

## 2025-02-14 DIAGNOSIS — I10 HYPERTENSION, UNSPECIFIED TYPE: ICD-10-CM

## 2025-02-14 DIAGNOSIS — I10 PRIMARY HYPERTENSION: ICD-10-CM

## 2025-02-14 PROCEDURE — 99215 OFFICE O/P EST HI 40 MIN: CPT | Mod: PBBFAC | Performed by: INTERNAL MEDICINE

## 2025-02-14 RX ORDER — DICLOFENAC SODIUM 10 MG/G
GEL TOPICAL
COMMUNITY
Start: 2025-01-02

## 2025-02-14 RX ORDER — NIFEDIPINE 30 MG/1
30 TABLET, EXTENDED RELEASE ORAL DAILY
Qty: 30 TABLET | Refills: 11 | Status: SHIPPED | OUTPATIENT
Start: 2025-02-14 | End: 2026-02-14

## 2025-02-14 RX ORDER — METHYLPREDNISOLONE 4 MG/1
TABLET ORAL
COMMUNITY
Start: 2025-02-07

## 2025-02-14 RX ORDER — NITROGLYCERIN 0.4 MG/1
0.4 TABLET SUBLINGUAL EVERY 5 MIN PRN
Qty: 25 TABLET | Refills: 1 | Status: SHIPPED | OUTPATIENT
Start: 2025-02-14

## 2025-02-14 RX ORDER — LANOLIN ALCOHOL/MO/W.PET/CERES
1 CREAM (GRAM) TOPICAL NIGHTLY
COMMUNITY
Start: 2025-02-07

## 2025-02-14 NOTE — PROGRESS NOTES
"  CHIEF COMPLAINT:   Chief Complaint   Patient presents with    3 month follow up with echo      Patient just rook BP medication   Pain in side and back   Lightheadedness  Leg swelling                    Review of patient's allergies indicates:  No Known Allergies                                       HPI:  Mary Lou Perez 64 y.o. female with pmh HTN, RA, Graves disease s/p lobectomy and hypothyroidism,GERD previously seen by [facility] once in 2019 that presents to Cardiology clinic for evaluation of CP and reports need for pre-op clearance prior to hysterectomy as well.     LV: 11/15/24    Substernal chest pain that radiates to the back. Says it feels like a pressure on her chest. Occurs with and without exertion. Improved with rest and nitro. Has not used nitro in the last six months secondary to medication expiration. Is associated with fatigue and SOB. She continues to have NGUYEN. 2+ orthopnea. Has not had additional episodes of palpitations. Denies N/V/diaphoresis. Can perform ADLs but cannot climb a flight of stairs. Reports being able to walk 1 block without SOB. Continues to have trace bilateral LE edema.    Reports history of GERD followed by GI.  Reports a remote history of "mild MI" in 1997 after which she was followed by [facility]. Also reports "mini stroke" in 2003.   Previous Stress Test and SABRINA at [facility]. Does not recall results.  Family history of MI in her mother and stroke history in her maternal grandmother.  She denies any tobacco ETOH or drug use.                                                                                                                                                                                                                                                                                                                                                                                                                                                                                    "     CARDIAC TESTING:      Results for orders placed during the hospital encounter of 01/16/25    Echo  Interpretation Summary    Left Ventricle: The left ventricle is normal in size. Ventricular mass is normal. Mildly increased wall thickness. There is concentric remodeling. Normal wall motion. There is low normal systolic function with a visually estimated ejection fraction of 50 - 55%. Quantitated ejection fraction is 53%. Grade I diastolic dysfunction.    Right Ventricle: Normal right ventricular cavity size. Systolic function is normal.    Left Atrium: Normal left atrial size.    Right Atrium: Normal right atrial size.  Prominent venous inflow of unclear etiology. Pt with known history of mediastinal mass excision.    Aortic Valve: There is mild aortic valve sclerosis. There is no stenosis. There is no significant regurgitation.    Mitral Valve: The mitral valve is structurally normal. There is no stenosis. There is trace regurgitation.    Tricuspid Valve: There is trace regurgitation.    Pulmonic Valve: There is trace regurgitation.    Aorta: Aortic root is normal in size measuring 3.0 cm.    Pulmonary Artery: No pulmonary hypertension. The estimated pulmonary artery systolic pressure is 29 mmHg.    IVC/SVC: Intermediate venous pressure at 8 mmHg.    Pericardium: There is no pericardial effusion.     30-Day Outpatient Telemetry  Underlying rhythm:   Sinus  Pauses:  No significant pause noted  Symptoms:  No symptoms reported.   Events:  During patient activated events  the patient is in sinus rhythm with HR 70 to 112  Arrhythmia:  No significant arrhythmia noted       Patient Active Problem List   Diagnosis    Caries    Fibromyalgia    Insomnia    Seropositive rheumatoid arthritis    GERD (gastroesophageal reflux disease)    Screening for colon cancer    Chronic constipation    Arthritis    Thymoma    H. pylori infection    PMB (postmenopausal bleeding)    Cough    Status post hysteroscopy     Past Surgical  History:   Procedure Laterality Date    BILATERAL TUBAL LIGATION      BREAST BIOPSY      CARDIAC CATHETERIZATION      DILATION AND CURETTAGE OF UTERUS      ESOPHAGOGASTRODUODENOSCOPY      HYSTEROSCOPY WITH DILATION AND CURETTAGE OF UTERUS N/A 5/28/2024    Procedure: HYSTEROSCOPY, WITH DILATION AND CURETTAGE OF UTERUS;  Surgeon: Elzbieta Lares MD;  Location: [facility];  Service: OB/GYN;  Laterality: N/A;  *babyscope*    MEDIASTINAL MASS EXCISION      MOUTH SURGERY      had bottom teeth pulled    MULTIPLE TOOTH EXTRACTIONS      All teeth now removed    THYROIDECTOMY, PARTIAL       Social History     Socioeconomic History    Marital status:      Spouse name: Rolando    Number of children: 4   Occupational History    Occupation: disabled   Tobacco Use    Smoking status: Never    Smokeless tobacco: Never   Substance and Sexual Activity    Alcohol use: Not Currently     Alcohol/week: 1.0 standard drink of alcohol     Types: 1 Glasses of wine per week     Comment: ocassionally    Drug use: Never    Sexual activity: Not Currently     Partners: Male     Birth control/protection: None     Social Drivers of Health     Financial Resource Strain: Low Risk  (5/23/2024)    Overall Financial Resource Strain (CARDIA)     Difficulty of Paying Living Expenses: Not hard at all   Food Insecurity: No Food Insecurity (5/23/2024)    Hunger Vital Sign     Worried About Running Out of Food in the Last Year: Never true     Ran Out of Food in the Last Year: Never true   Transportation Needs: No Transportation Needs (5/23/2024)    TRANSPORTATION NEEDS     Transportation : No   Physical Activity: Inactive (5/23/2024)    Exercise Vital Sign     Days of Exercise per Week: 0 days     Minutes of Exercise per Session: 0 min   Stress: No Stress Concern Present (5/23/2024)    [employer]izen [place] [employer] - Occupational Stress Questionnaire     Feeling of Stress : Not at all   Housing Stability: Low Risk  (6/23/2022)    Housing  Stability Vital Sign     Unable to Pay for Housing in the Last Year: No     Number of Places Lived in the Last Year: 1     Unstable Housing in the Last Year: No        Family History   Problem Relation Name Age of Onset    Hypertension Mother      Diabetes Mother      Breast cancer Mother  45        bilateral    Lung cancer Father      Diabetes Daughter      Cervical cancer Daughter  27    Lung cancer Maternal Uncle           Current Outpatient Medications:     acetaminophen (TYLENOL) 500 MG tablet, Take 2 tablets (1,000 mg total) by mouth every 8 (eight) hours., Disp: 30 tablet, Rfl: 0    albuterol (PROVENTIL/VENTOLIN HFA) 90 mcg/actuation inhaler, Inhale 2 puffs into the lungs every 6 (six) hours as needed for Shortness of Breath., Disp: 18 g, Rfl: 2    ascorbic acid, vitamin C, (VITAMIN C) 500 MG tablet, Take 500 mg by mouth once daily., Disp: , Rfl:     cyanocobalamin, vitamin B-12, (VITAMIN B-12) 2,500 mcg Subl, Place 1 tablet under the tongue Daily., Disp: 90 each, Rfl: 3    diclofenac sodium (VOLTAREN) 1 % Gel, SMARTSI Gram(s) Topical 3 Times Daily, Disp: , Rfl:     fluticasone propionate (FLONASE) 50 mcg/actuation nasal spray, 1 spray by Each Nostril route Daily., Disp: , Rfl:     fluticasone-salmeterol diskus inhaler 500-50 mcg, Inhale 1 puff into the lungs 2 (two) times daily. Controller, Disp: 60 each, Rfl: 2    folic acid (FOLVITE) 1 MG tablet, Take 1 tablet (1,000 mcg total) by mouth once daily., Disp: 90 tablet, Rfl: 3    gabapentin (NEURONTIN) 400 MG capsule, Take 400 mg by mouth 3 (three) times daily., Disp: , Rfl:     HUMIRA,CF, PEN 40 mg/0.4 mL PnKt, Inject 0.4 mLs (40 mg total) into the skin once a week., Disp: 4 pen, Rfl: 11    HYDROcodone-acetaminophen (NORCO)  mg per tablet, Take 1 tablet by mouth every 8 (eight) hours as needed for Pain., Disp: 90 tablet, Rfl: 0    hydroxychloroquine (PLAQUENIL) 200 mg tablet, Take 1 tablet (200 mg total) by mouth 2 (two) times daily., Disp: 180  "tablet, Rfl: 3    levothyroxine (SYNTHROID) 125 MCG tablet, Take 1 tablet (125 mcg total) by mouth once daily., Disp: 90 tablet, Rfl: 2    magnesium oxide (MAG-OX) 400 mg (241.3 mg magnesium) tablet, Take 1 tablet by mouth every evening., Disp: , Rfl:     meclizine (ANTIVERT) 25 mg tablet, Take 1 tablet (25 mg total) by mouth 3 (three) times daily as needed., Disp: 30 tablet, Rfl: 2    methotrexate 2.5 MG Tab, TAKE 3 TABLETS BY MOUTH AFTER LUNCH AND TAKE 3 TABLETS AFTER SUPPER ON TUESDAY S, Disp: 90 tablet, Rfl: 3    methylPREDNISolone (MEDROL DOSEPACK) 4 mg tablet, Take by mouth., Disp: , Rfl:     nitroGLYCERIN (NITROSTAT) 0.4 MG SL tablet, , Disp: , Rfl:     omeprazole (PRILOSEC) 40 MG capsule, Take 1 capsule (40 mg total) by mouth every morning., Disp: 90 capsule, Rfl: 3    predniSONE (DELTASONE) 5 MG tablet, Take 5 mg by mouth daily as needed., Disp: , Rfl:     tiZANidine (ZANAFLEX) 4 MG tablet, TAKE 2 TABLETS BY MOUTH ONCE DAILY AT BEDTIME, Disp: , Rfl:     triamterene-hydrochlorothiazide 37.5-25 mg (MAXZIDE-25) 37.5-25 mg per tablet, Take 1 tablet by mouth once daily., Disp: 90 tablet, Rfl: 2    valACYclovir (VALTREX) 500 MG tablet, Take 1 tablet (500 mg total) by mouth every evening., Disp: 30 tablet, Rfl: 11    zolpidem (AMBIEN) 10 mg Tab, Take 10 mg by mouth nightly as needed., Disp: , Rfl:      ROS:                                                                                                                                                                             As above    Blood pressure 130/82, pulse 86, temperature 98.3 °F (36.8 °C), temperature source Oral, resp. rate 18, height 5' 2" (1.575 m), weight 104.9 kg (231 lb 3.2 oz), SpO2 97%.     PE:  General: Awake, alert, & oriented to person, place & time. No acute distress  Psychiatric: Mood and affect normal  HEENT: Normocephalic, atraumatic. Symmetric  Cardiovascular: Regular rate & rhythm, Normal S1 & S2 w/out murmurs, rubs or gallops, No JVD " appreciated, Negative hepatojugular reflex, 2+ pulses throughout  Pulmonary: Bilateral symmetric chest rise, Non-labored, no wheezes, rhonchi or crackles are appreciated  Abdominal:  Soft and nondistended  Extremities: No clubbing, cyanosis. trace B/L Le edema.  Skin:  Exposed skin is warm & dry.  Neuro:   Patient moves all extremities equally. Sensation intact bilateraly.       ASSESSMENT/PLAN:      CP  Palpitations  Self reported CAD  Self reported h/o CHF  HTN  HLD    Chest pain: Typical angina. Showing little improvement but not progressive.  Edema: Stable trace bilateral LE pitting EDEMA.  SOB/NGUYEN: DASI: 4.70 METS. No rales on exam. No signs of overt hypervolemia.  Echo: G1DD, low normal systolic dysfunction with concentric remodeling consistent with chronic hypertension without significant valvular pathology. EF 50-55%.  30-day event monitoring demonstrates normal sinus rhythm without arrhythmic events, blocks, or pauses.  BP not at goal.  Start Procardia 30 mg daily. Encouraged to keep BP logs.  Did not perform NST yet. Educated on risks/benefits and agreed to undergo NST as part of cardiac risk assessment given inability to accurately assess functional status and c/o NGUYEN/CP. Reordered NST.  Refill for sublingual nitroglycerin 0.4 mg sent.  F/U 4 months    Denita Dooley, MS3  [facility]

## 2025-02-14 NOTE — PROGRESS NOTES
"02/14/2025  12:32 PM    Cardiology Attending Addendum to Medical Student Note  I evaluated Mary Lou Perez and discussed the patient's symptoms, findings, and management plan with the medical student.     Subjective:   Ms. Mary Lou Perez is a 64 y.o. female with:   Chief Complaint   Patient presents with    3 month follow up with echo      Patient just rook BP medication   Pain in side and back   Lightheadedness  Leg swelling          HPI  Mary Lou Perez 64 y.o. female with pmh HTN, RA, Graves disease s/p lobectomy and hypothyroidism,GERD previously seen by [facility] once in 2019 that presents to Cardiology clinic for evaluation of CP and reports need for pre-op clearance prior to hysterectomy as well.      LV: 11/15/24     Substernal chest pain that radiates to the back. Says it feels like a pressure on her chest. Occurs with and without exertion. Improved with rest and nitro. Has not used nitro in the last six months secondary to medication expiration. Is associated with fatigue and SOB. She continues to have NGUYEN. 2+ orthopnea. Has not had additional episodes of palpitations. Denies N/V/diaphoresis. Can perform ADLs but cannot climb a flight of stairs. Reports being able to walk 1 block without SOB. Continues to have trace bilateral LE edema.     Reports history of GERD followed by GI.  Reports a remote history of "mild MI" in 1997 after which she was followed by [facility]. Also reports "mini stroke" in 2003.   Previous Stress Test and SABRINA at [facility]. Does not recall results.  Family history of MI in her mother and stroke history in her maternal grandmother.  She denies any tobacco ETOH or drug use.    Activities are limited due to RA and fatigue    PMH:    Patient Active Problem List   Diagnosis    Caries    Fibromyalgia    Insomnia    Seropositive rheumatoid arthritis    GERD (gastroesophageal reflux disease)    Screening for colon cancer    Chronic constipation    Arthritis    Thymoma    H. pylori " infection    PMB (postmenopausal bleeding)    Cough    Status post hysteroscopy    Chest pain    Primary hypertension    Fatigue    Morbid obesity       Surgical Hx:    Past Surgical History:   Procedure Laterality Date    BILATERAL TUBAL LIGATION      BREAST BIOPSY      CARDIAC CATHETERIZATION      DILATION AND CURETTAGE OF UTERUS      ESOPHAGOGASTRODUODENOSCOPY      HYSTEROSCOPY WITH DILATION AND CURETTAGE OF UTERUS N/A 5/28/2024    Procedure: HYSTEROSCOPY, WITH DILATION AND CURETTAGE OF UTERUS;  Surgeon: Elzbieta Lares MD;  Location: [facility];  Service: OB/GYN;  Laterality: N/A;  *babyscope*    MEDIASTINAL MASS EXCISION      MOUTH SURGERY      had bottom teeth pulled    MULTIPLE TOOTH EXTRACTIONS      All teeth now removed    THYROIDECTOMY, PARTIAL         Social History     Socioeconomic History    Marital status:      Spouse name: Yesikaopher    Number of children: 4   Occupational History    Occupation: disabled   Tobacco Use    Smoking status: Never    Smokeless tobacco: Never   Substance and Sexual Activity    Alcohol use: Not Currently     Alcohol/week: 1.0 standard drink of alcohol     Types: 1 Glasses of wine per week     Comment: ocassionally    Drug use: Never    Sexual activity: Not Currently     Partners: Male     Birth control/protection: None     Social Drivers of Health     Financial Resource Strain: Low Risk  (5/23/2024)    Overall Financial Resource Strain (CARDIA)     Difficulty of Paying Living Expenses: Not hard at all   Food Insecurity: No Food Insecurity (5/23/2024)    Hunger Vital Sign     Worried About Running Out of Food in the Last Year: Never true     Ran Out of Food in the Last Year: Never true   Transportation Needs: No Transportation Needs (5/23/2024)    TRANSPORTATION NEEDS     Transportation : No   Physical Activity: Inactive (5/23/2024)    Exercise Vital Sign     Days of Exercise per Week: 0 days     Minutes of Exercise per Session: 0 min   Stress: No Stress Concern  Present (2024)    [facility] [employer] - Occupational Stress Questionnaire     Feeling of Stress : Not at all   Housing Stability: Low Risk  (2022)    Housing Stability Vital Sign     Unable to Pay for Housing in the Last Year: No     Number of Places Lived in the Last Year: 1     Unstable Housing in the Last Year: No     Family History   Problem Relation Name Age of Onset    Hypertension Mother      Diabetes Mother      Breast cancer Mother  45        bilateral    Lung cancer Father      Diabetes Daughter      Cervical cancer Daughter  27    Lung cancer Maternal Uncle       Review of patient's allergies indicates:  No Known Allergies    Current Medications:    Current Outpatient Medications   Medication Instructions    acetaminophen (TYLENOL) 1,000 mg, Oral, Every 8 hours    albuterol (PROVENTIL/VENTOLIN HFA) 90 mcg/actuation inhaler 2 puffs, Inhalation, Every 6 hours PRN    ascorbic acid (vitamin C) (VITAMIN C) 500 mg, Daily    cyanocobalamin, vitamin B-12, (VITAMIN B-12) 2,500 mcg Subl 1 tablet, Sublingual, Daily    diclofenac sodium (VOLTAREN) 1 % Gel SMARTSI Gram(s) Topical 3 Times Daily    fluticasone propionate (FLONASE) 50 mcg/actuation nasal spray 1 spray, Daily    fluticasone-salmeterol diskus inhaler 500-50 mcg 1 puff, Inhalation, 2 times daily, Controller    folic acid (FOLVITE) 1,000 mcg, Oral, Daily    gabapentin (NEURONTIN) 400 mg, 3 times daily    HUMIRA(CF) PEN 40 mg, Subcutaneous, Weekly    HYDROcodone-acetaminophen (NORCO)  mg per tablet 1 tablet, Oral, Every 8 hours PRN    hydroxychloroquine (PLAQUENIL) 200 mg, Oral, 2 times daily    levothyroxine (SYNTHROID) 125 mcg, Oral, Daily    magnesium oxide (MAG-OX) 400 mg (241.3 mg magnesium) tablet 1 tablet, Nightly    meclizine (ANTIVERT) 25 mg, Oral, 3 times daily PRN    methotrexate 2.5 MG Tab TAKE 3 TABLETS BY MOUTH AFTER LUNCH AND TAKE 3 TABLETS AFTER SUPPER ON TUESDAY S    methylPREDNISolone (MEDROL DOSEPACK)  "4 mg tablet Take by mouth.    NIFEdipine (PROCARDIA-XL) 30 mg, Oral, Daily    nitroGLYCERIN (NITROSTAT) 0.4 mg, Sublingual, Every 5 min PRN    omeprazole (PRILOSEC) 40 mg, Oral, Every morning    predniSONE (DELTASONE) 5 mg, Daily PRN    tiZANidine (ZANAFLEX) 4 MG tablet TAKE 2 TABLETS BY MOUTH ONCE DAILY AT BEDTIME    triamterene-hydrochlorothiazide 37.5-25 mg (MAXZIDE-25) 37.5-25 mg per tablet 1 tablet, Oral, Daily    valACYclovir (VALTREX) 500 mg, Oral, Nightly    zolpidem (AMBIEN) 10 mg, Nightly PRN       ROS:  Please see HPI.    Objective:     BP Readings from Last 3 Encounters:   02/14/25 130/82   01/16/25 138/86   11/15/24 (!) 141/88        Pulse Readings from Last 3 Encounters:   02/14/25 86   11/15/24 81   05/28/24 68        Temp Readings from Last 3 Encounters:   02/14/25 98.3 °F (36.8 °C) (Oral)   11/15/24 98.8 °F (37.1 °C) (Oral)   05/28/24 98.2 °F (36.8 °C)     Wt Readings from Last 3 Encounters:   02/14/25 104.9 kg (231 lb 3.2 oz)   01/16/25 104.8 kg (231 lb)   11/15/24 104.8 kg (231 lb)     BMI:  42.29 kg/m^2    PE  Blood pressure 130/82, pulse 86, temperature 98.3 °F (36.8 °C), temperature source Oral, resp. rate 18, height 5' 2" (1.575 m), weight 104.9 kg (231 lb 3.2 oz), SpO2 97%.  CONSTITUTIONAL:  No acute distress.  Not ill appearing.  GENERAL:  Pleasant  NEUROLOGICAL:  Awake and alert.  Communicates  adequately  NECK:  supple  RESPIRATIONS:  no apparent distress  ABDOMEN:   obese  SKIN:  dry     CARDIAC TESTS  Echo  Results for orders placed during the hospital encounter of 01/16/25    Echo    Interpretation Summary    Left Ventricle: The left ventricle is normal in size. Ventricular mass is normal. Mildly increased wall thickness. There is concentric remodeling. Normal wall motion. There is low normal systolic function with a visually estimated ejection fraction of 50 - 55%. Quantitated ejection fraction is 53%. Grade I diastolic dysfunction.    Right Ventricle: Normal right ventricular cavity " "size. Systolic function is normal.    Left Atrium: Normal left atrial size.    Right Atrium: Normal right atrial size.  Prominent venous inflow of unclear etiology. Pt with known history of mediastinal mass excision.    Aortic Valve: There is mild aortic valve sclerosis. There is no stenosis. There is no significant regurgitation.    Mitral Valve: The mitral valve is structurally normal. There is no stenosis. There is trace regurgitation.    Tricuspid Valve: There is trace regurgitation.    Pulmonic Valve: There is trace regurgitation.    Aorta: Aortic root is normal in size measuring 3.0 cm.    Pulmonary Artery: No pulmonary hypertension. The estimated pulmonary artery systolic pressure is 29 mmHg.    IVC/SVC: Intermediate venous pressure at 8 mmHg.    Pericardium: There is no pericardial effusion.    Stress Test     No results found for this or any previous visit.    LHC  No results found for this or any previous visit.    Holter Monitor  No cardiac monitor results found for the past 12 months    LABS  Last BMP BMP  Lab Results   Component Value Date     05/23/2024    K 4.3 05/23/2024     05/23/2024    CO2 28 05/23/2024    BUN 17.8 05/23/2024    CREATININE 0.85 05/23/2024    CALCIUM 10.2 05/23/2024    EGFRNORACEVR >60 05/23/2024       Creatinine    Lab Results   Component Value Date    CREATININE 0.85 05/23/2024    CREATININE 0.90 03/22/2023    CREATININE 0.91 12/07/2022     Magnesium No components found for: "MAG"  BNP  No results found for: "BNP"  Troponin    Lab Results   Component Value Date    TROPONINI <0.015 03/03/2020     Last CBC     Lab Results   Component Value Date    WBC 7.05 05/23/2024    HGB 13.3 05/23/2024    HCT 39.1 05/23/2024    MCV 90.1 05/23/2024     05/23/2024           Last lipids    Lab Results   Component Value Date    CHOL 146 04/07/2022    CHOL 155 07/01/2019    CHOL 133 09/25/2018    HDL 53 04/07/2022    HDL 52 07/01/2019    HDL 46 09/25/2018    LDL 70.00 04/07/2022 " "   LDL 80 07/01/2019    LDL 67 09/25/2018    TRIG 114 04/07/2022    TRIG 113 07/01/2019    TRIG 99 09/25/2018    TOTALCHOLEST 3 04/07/2022    TOTALCHOLEST 3.0 07/01/2019    TOTALCHOLEST 2.9 09/25/2018       LFT   No components found for: "LFT"    Assessment:   1. Hypertension, unspecified type  - NIFEdipine (PROCARDIA-XL) 30 MG (OSM) 24 hr tablet; Take 1 tablet (30 mg total) by mouth once daily.  Dispense: 30 tablet; Refill: 11    2. Chest pain, unspecified type  - nitroGLYCERIN (NITROSTAT) 0.4 MG SL tablet; Place 1 tablet (0.4 mg total) under the tongue every 5 (five) minutes as needed for Chest pain.  Dispense: 25 tablet; Refill: 1  - Nuclear Stress - Cardiology Interpreted; Future    3. Primary hypertension    4. Fatigue, unspecified type    5. Morbid obesity    10 Year Cardiovascular Risk:  The 10-year ASCVD risk score (Winnie HESS, et al., 2019) is: 6.8%*    Values used to calculate the score:      Age: 64 years      Sex: Female      Is Non- : Yes      Diabetic: No      Tobacco smoker: No      Systolic Blood Pressure: 130 mmHg      Is BP treated: Yes      HDL Cholesterol: 53 mg/dL*      Total Cholesterol: 146 mg/dL*      * - Cholesterol units were assumed for this score calculation  BMI:  Body mass index is 42.29 kg/m².  Patient has super morbid obesity (BMI >39.9)  Last PCP visit:  5/23/2024      Plan:     MEDICATIONS:  Start nifedipine 30 mg daily for high blood pressure and also for as antianginal  Give refill for sublingual nitroglycerin p.r.n.    WORK UP:  Schedule Lexiscan Cardiolite due to patient's chest discomfort (patient is unable to walk on treadmill due to RA and fatigue)    PRE-OP CARDIAC RISK ASSESSMENT:  nuclear stress test ordered since patient has chest discomfort    WEIGHT:     Wt Readings from Last 3 Encounters:   02/14/25 104.9 kg (231 lb 3.2 oz)   01/16/25 104.8 kg (231 lb)   11/15/24 104.8 kg (231 lb)     BLOOD PRESSURE:    BP Readings from Last 3 Encounters: " "  02/14/25 130/82   01/16/25 138/86   11/15/24 (!) 141/88     HEART RATE:    Pulse Readings from Last 3 Encounters:   02/14/25 86   11/15/24 81   05/28/24 68     LIPIDS:    Lab Results   Component Value Date    CHOL 146 04/07/2022    LDL 70.00 04/07/2022       BNP:  No results found for: "BNP"    FOLLOW UP:  4 months  Nurse visit for blood pressure check    Reginaldo García MD  Cardiology Attending     Future Appointments   Date Time Provider Department Center   3/24/2025 10:10 AM Laura Chin DO [facility]   6/17/2025  2:00 PM [facility]        "

## 2025-03-17 ENCOUNTER — HOSPITAL ENCOUNTER (OUTPATIENT)
Dept: RADIOLOGY | Facility: HOSPITAL | Age: 65
Discharge: HOME | End: 2025-03-17
Attending: INTERNAL MEDICINE
Payer: MEDICARE

## 2025-03-17 ENCOUNTER — HOSPITAL ENCOUNTER (OUTPATIENT)
Dept: CARDIOLOGY | Facility: HOSPITAL | Age: 65
Discharge: HOME | End: 2025-03-17
Attending: INTERNAL MEDICINE
Payer: MEDICARE

## 2025-03-17 VITALS
BODY MASS INDEX: 42.55 KG/M2 | HEIGHT: 62 IN | RESPIRATION RATE: 20 BRPM | SYSTOLIC BLOOD PRESSURE: 146 MMHG | WEIGHT: 231.25 LBS | HEART RATE: 84 BPM | DIASTOLIC BLOOD PRESSURE: 83 MMHG

## 2025-03-17 DIAGNOSIS — R07.9 CHEST PAIN, UNSPECIFIED TYPE: ICD-10-CM

## 2025-03-17 LAB
CV STRESS BASE HR: 82 BPM
DIASTOLIC BLOOD PRESSURE: 83 MMHG
NUC REST DIASTOLIC VOLUME INDEX: 68
NUC REST EJECTION FRACTION: 55
NUC REST SYSTOLIC VOLUME INDEX: 30
NUC STRESS DIASTOLIC VOLUME INDEX: 75
NUC STRESS EJECTION FRACTION: 71 %
NUC STRESS SYSTOLIC VOLUME INDEX: 22
OHS CV CPX 85 PERCENT MAX PREDICTED HEART RATE MALE: 133
OHS CV CPX ESTIMATED METS: 2
OHS CV CPX MAX PREDICTED HEART RATE: 156
OHS CV CPX PATIENT IS FEMALE: 1
OHS CV CPX PATIENT IS MALE: 0
OHS CV CPX PEAK DIASTOLIC BLOOD PRESSURE: 82 MMHG
OHS CV CPX PEAK HEAR RATE: 136 BPM
OHS CV CPX PEAK RATE PRESSURE PRODUCT: NORMAL
OHS CV CPX PEAK SYSTOLIC BLOOD PRESSURE: 178 MMHG
OHS CV CPX PERCENT MAX PREDICTED HEART RATE ACHIEVED: 91
OHS CV CPX RATE PRESSURE PRODUCT PRESENTING: NORMAL
OHS CV INITIAL DOSE: 31.7 MCG/KG/MIN
OHS CV PEAK DOSE: 10.6 MCG/KG/MIN
STRESS ECHO POST EXERCISE DUR MIN: 2 MINUTES
STRESS ECHO POST EXERCISE DUR SEC: 6 SECONDS
SUMMED DIFFERENCE: 0
SUMMED REST SCORE: 0
SUMMED STRESS SCORE: 0
SYSTOLIC BLOOD PRESSURE: 146 MMHG

## 2025-03-17 PROCEDURE — 63600175 PHARM REV CODE 636 W HCPCS: Performed by: INTERNAL MEDICINE

## 2025-03-17 PROCEDURE — A9502 TC99M TETROFOSMIN: HCPCS | Performed by: INTERNAL MEDICINE

## 2025-03-17 PROCEDURE — 93017 CV STRESS TEST TRACING ONLY: CPT

## 2025-03-17 PROCEDURE — 78452 HT MUSCLE IMAGE SPECT MULT: CPT

## 2025-03-17 RX ORDER — REGADENOSON 0.08 MG/ML
0.4 INJECTION, SOLUTION INTRAVENOUS
Status: COMPLETED | OUTPATIENT
Start: 2025-03-17 | End: 2025-03-17

## 2025-03-17 RX ADMIN — REGADENOSON 0.4 MG: 0.08 INJECTION, SOLUTION INTRAVENOUS at 08:03

## 2025-03-17 RX ADMIN — TETROFOSMIN 31.7 MILLICURIE: 1.38 INJECTION, POWDER, LYOPHILIZED, FOR SOLUTION INTRAVENOUS at 09:03

## 2025-03-17 RX ADMIN — TETROFOSMIN 10.6 MILLICURIE: 1.38 INJECTION, POWDER, LYOPHILIZED, FOR SOLUTION INTRAVENOUS at 08:03

## 2025-03-18 LAB
OHS QRS DURATION: 92 MS
OHS QTC CALCULATION: 453 MS

## 2025-03-24 ENCOUNTER — OFFICE VISIT (OUTPATIENT)
Dept: INTERNAL MEDICINE | Facility: CLINIC | Age: 65
End: 2025-03-24
Payer: MEDICARE

## 2025-03-24 VITALS
HEIGHT: 62 IN | WEIGHT: 230.19 LBS | RESPIRATION RATE: 189 BRPM | BODY MASS INDEX: 42.36 KG/M2 | SYSTOLIC BLOOD PRESSURE: 137 MMHG | OXYGEN SATURATION: 94 % | TEMPERATURE: 98 F | HEART RATE: 84 BPM | DIASTOLIC BLOOD PRESSURE: 85 MMHG

## 2025-03-24 DIAGNOSIS — Z86.39: ICD-10-CM

## 2025-03-24 DIAGNOSIS — I10 HYPERTENSION, UNSPECIFIED TYPE: Primary | ICD-10-CM

## 2025-03-24 DIAGNOSIS — M85.80 OSTEOPENIA, UNSPECIFIED LOCATION: ICD-10-CM

## 2025-03-24 DIAGNOSIS — M85.851 OSTEOPENIA OF RIGHT HIP: ICD-10-CM

## 2025-03-24 PROCEDURE — 99215 OFFICE O/P EST HI 40 MIN: CPT | Mod: PBBFAC | Performed by: STUDENT IN AN ORGANIZED HEALTH CARE EDUCATION/TRAINING PROGRAM

## 2025-03-24 RX ORDER — TRIAMTERENE/HYDROCHLOROTHIAZID 37.5-25 MG
1 TABLET ORAL DAILY
Qty: 90 TABLET | Refills: 2 | Status: SHIPPED | OUTPATIENT
Start: 2025-03-24

## 2025-03-24 RX ORDER — LEVOTHYROXINE SODIUM 125 UG/1
125 TABLET ORAL DAILY
Qty: 90 TABLET | Refills: 2 | Status: SHIPPED | OUTPATIENT
Start: 2025-03-24

## 2025-03-24 NOTE — PROGRESS NOTES
[facility]    Subjective:      61-year-old with past medical history of rheumatoid arthritis, fibromyalgia, thymoma status post removal, Graves' disease status post thyroidectomy on Synthroid, history of GERD, hypertension, pulmonary nodules today for follow-up. Following up with Dr. Lynch. No complaints today.   She is undergoing hysteroscopy D&C with gyn and needs medication clearance.    3/24/25:  No complaints today. Need refills on meds.     Review of Systems:  10 point ROS negative except for HPI    Objective:   Vital Signs:  Vitals:    03/24/25 1005   BP: 137/85   Pulse: 84   Resp: (!) 189   Temp: 98.2 °F (36.8 °C)      General: well-developed well-nourished in no acute distress  Neck: full range of motion, no thyromegaly or lymphadenopathy  Respiratory: clear to auscultation bilaterally  Cardiovascular: regular rate and rhythm without murmurs, gallops or rubs  Gastrointestinal: soft, non-tender, non-distended with normal bowel sounds, without masses to palpation  Musculoskeletal: MCP PIP wrist elbows shoulders hips knees and ankles tender bilaterally.  Integumentary: no rashes or skin lesions present  Breast: No lumps or nodules noted or tender on palpation  Neurologic: cranial nerves intact, no signs of peripheral neurological deficit, motor/sensory function intact      Body mass index is 42.1 kg/m².         Laboratory:  Lab Results   Component Value Date    WBC 7.05 05/23/2024    HGB 13.3 05/23/2024    HCT 39.1 05/23/2024     05/23/2024    MCV 90.1 05/23/2024    RDW 13.7 05/23/2024    Lab Results   Component Value Date     05/23/2024    K 4.3 05/23/2024     05/23/2024    CO2 28 05/23/2024    BUN 17.8 05/23/2024    CREATININE 0.85 05/23/2024    CALCIUM 10.2 05/23/2024      Lab Results   Component Value Date    HGBA1C 4.8 04/07/2022    EAG 91.1 04/07/2022    CREATININE 0.85 05/23/2024    Lab Results   Component Value Date    TSH 2.683 05/23/2024    [id number]  0.99 2024                Current Medications:  Current Outpatient Medications   Medication Instructions    acetaminophen (TYLENOL) 1,000 mg, Oral, Every 8 hours    albuterol (PROVENTIL/VENTOLIN HFA) 90 mcg/actuation inhaler 2 puffs, Inhalation, Every 6 hours PRN    ascorbic acid (vitamin C) (VITAMIN C) 500 mg, Daily    cyanocobalamin, vitamin B-12, (VITAMIN B-12) 2,500 mcg Subl 1 tablet, Sublingual, Daily    diclofenac sodium (VOLTAREN) 1 % Gel SMARTSI Gram(s) Topical 3 Times Daily    fluticasone propionate (FLONASE) 50 mcg/actuation nasal spray 1 spray, Daily    fluticasone-salmeterol diskus inhaler 500-50 mcg 1 puff, Inhalation, 2 times daily, Controller    folic acid (FOLVITE) 1,000 mcg, Oral, Daily    gabapentin (NEURONTIN) 400 mg, 3 times daily    HUMIRA(CF) PEN 40 mg, Subcutaneous, Weekly    HYDROcodone-acetaminophen (NORCO)  mg per tablet 1 tablet, Oral, Every 8 hours PRN    hydroxychloroquine (PLAQUENIL) 200 mg, Oral, 2 times daily    levothyroxine (SYNTHROID) 125 mcg, Oral, Daily    magnesium oxide (MAG-OX) 400 mg (241.3 mg magnesium) tablet 1 tablet, Nightly    meclizine (ANTIVERT) 25 mg, Oral, 3 times daily PRN    methotrexate 2.5 MG Tab TAKE 3 TABLETS BY MOUTH AFTER LUNCH AND TAKE 3 TABLETS AFTER SUPPER ON TUESDAY S    methylPREDNISolone (MEDROL DOSEPACK) 4 mg tablet Take by mouth.    NIFEdipine (PROCARDIA-XL) 30 mg, Oral, Daily    nitroGLYCERIN (NITROSTAT) 0.4 mg, Sublingual, Every 5 min PRN    omeprazole (PRILOSEC) 40 mg, Oral, Every morning    predniSONE (DELTASONE) 5 mg, Daily PRN    tiZANidine (ZANAFLEX) 4 MG tablet TAKE 2 TABLETS BY MOUTH ONCE DAILY AT BEDTIME    triamterene-hydrochlorothiazide 37.5-25 mg (MAXZIDE-25) 37.5-25 mg per tablet 1 tablet, Oral, Daily    valACYclovir (VALTREX) 500 mg, Oral, Nightly    zolpidem (AMBIEN) 10 mg, Nightly PRN        Assessment and Plan:      Seropositive rheumatoid arthritis  FRANK positive, rheumatoid factor positive, CCP positive  Continue  Plaquenil 200 mg twice daily  Stopped  prednisone taper  Continue methotrexate 15 mg and folic acid daily   Continue tizanidine, gabapentin and norco  for pain   Continue following Rheum with Dr. Lynch( last seen 10/19/22)  Continue humira    Fibromyalgia  Denies any muscle pain or body pain at this time  Continue tizanidine, gabapentin and norco  for pain given by Dr. Lynch     Thymoma status post thymectomy 2014  Denies chest pain, hoarse voice, swelling in the face, neck, upper body arms at this time  Was Following ENT , Last seen AT 9/3/20, continue Flonase, antihistamine per ENT  Instructed pt to follow with ENT    Graves' disease status post thyroidectomy  On Synthroid daily  Denies heart palpitations  ENT referred for a thyroid US with results below :  Ultrasound thyroid on 11/02/2020 shows right thyroidectomy.  1 cm left-sided thyroid nodule.  Hypervascular left level thyroid consistent with thyroiditis.  TFTS wnl 3/23 and Thyroid US on 1/23 stable  TSH ordered  today        Non -CAD   C in 1997;No interventions done per pt  Was following cards in the past   Last seen cardiologist on 09/30/2020  Lexiscan stress test on 3/25- for ischemia  Echo done on 3/25 shows left undergo ejection fraction 55 to 60%  Denies any chest pain or SOB at this time    GERD  Patient used to follow up with GI before but lost in touch when covid hits  H. pylori gastritis confirmed by biopsy January 2020, completed antibiotics   fecal occult immunoassay for colon cancer screening was neg in 9/2021  switched from Nexium to Rabeprazole 40 daily at this time  Refused EGD appt scheduled on 11/22 with GI   Refused EGD and colonoscopy 3/25      Hypertension- controlled  Headache - improved   Continue HCTZ-traimterene  Started on Nifedipine 30 daily and BP improved and head ache improved   Gave BP log today     Vertigo   Takes meclizine PRN     Fam hx of breast cancer  Mammogram uptd on 9/24      HSV+ 1 AND 2  Completed  Acyclovir treatment; PRN meds for outbreaks   Not having any flares at this time  Syphilis, hep panel  and HIV neg    Follows gyn        Flu shot uptd  Mammogram 9/24 uptd  papsmear appt in 4/23, follows with gyn   Refuses colonoscopy and EGD;follows GI  Last DXA in 2020 showed osteopenia ;  repeat DXA 1/23- osteopenia; continue vitamin d and ca tabs; repeat ordered today   stable pum nodules for years(last CT scan in 2017); uptd 2023; low dose CT scan ordered prior but needs to do them   Refuses vaccines        Health Maintenance   Topic Date Due    Shingles Vaccine (1 of 2) Never done    Pneumococcal Vaccines (Age 50+) (2 of 2 - PCV) 11/08/2017    RSV Vaccine (Age 60+ and Pregnant patients) (1 - Risk 60-74 years 1-dose series) Never done    COVID-19 Vaccine (3 - Pfizer risk series) 09/09/2021    Influenza Vaccine (1) 09/01/2024    Hemoglobin A1c (Diabetic Prevention Screening)  04/07/2025    Mammogram  09/18/2025    Cervical Cancer Screening  04/20/2026    Colorectal Cancer Screening  08/21/2026    Lipid Panel  04/07/2027    TETANUS VACCINE  07/18/2030    Hepatitis C Screening  Completed    HIV Screening  Completed            RTC in 4 months   DXA scan   Labs prior to next visit       Laura Chin DO

## 2025-04-16 ENCOUNTER — CLINICAL SUPPORT (OUTPATIENT)
Dept: CARDIOLOGY | Facility: CLINIC | Age: 65
End: 2025-04-16
Payer: MEDICARE

## 2025-04-16 VITALS
WEIGHT: 223 LBS | SYSTOLIC BLOOD PRESSURE: 135 MMHG | HEIGHT: 62 IN | RESPIRATION RATE: 18 BRPM | HEART RATE: 101 BPM | OXYGEN SATURATION: 100 % | DIASTOLIC BLOOD PRESSURE: 75 MMHG | BODY MASS INDEX: 41.04 KG/M2

## 2025-04-16 DIAGNOSIS — I10 PRIMARY HYPERTENSION: Primary | ICD-10-CM

## 2025-04-16 PROCEDURE — 99215 OFFICE O/P EST HI 40 MIN: CPT | Mod: PBBFAC

## 2025-04-16 NOTE — PATIENT INSTRUCTIONS
Continue current medication regimen  Keep all follow up appointments  Strict ED precautions given  
4

## 2025-04-16 NOTE — PROGRESS NOTES
Pt visit done by phone d/t transportation issues. Todays visit is for bp and Hr check.  Sp echo 11/15/24. Lcv bp 130/82 and 86 pulse. Bp today per pt 135/75 Hr 101. Pt  States her Hr was elevated d/t transportation issue today.Pt denies any cardiac targets   today aside from slight edema to rt lower ext. Which elevation appears to help. Pt states  She also has been noticing bruising in several places of unknown origin and thinks it may be from  Humira but If it continues she will seek ED attention.This visit along w/bp log presented to Dr García for review.  Pt instructions are as follows: Continue current medication regimen, keep all future appointments, Strict ED precautions given.   Pt verbalized understanding of POC.

## 2025-04-23 ENCOUNTER — PATIENT OUTREACH (OUTPATIENT)
Facility: OTHER | Age: 65
End: 2025-04-23
Payer: MEDICARE

## 2025-04-23 ENCOUNTER — OCHSNER VIRTUAL EMERGENCY DEPARTMENT (OUTPATIENT)
Facility: CLINIC | Age: 65
End: 2025-04-23
Payer: MEDICARE

## 2025-04-23 ENCOUNTER — NURSE TRIAGE (OUTPATIENT)
Dept: ADMINISTRATIVE | Facility: CLINIC | Age: 65
End: 2025-04-23
Payer: MEDICARE

## 2025-04-23 NOTE — PLAN OF CARE-OVED
[facility] Emergency Department Plan of Care Note  Referral Source: Nurse On-Call                               Chief Complaint   Patient presents with    Knee Injury     From nurse on call, pt that fell last Thursday and injured her knee.  now reports worsening swelling and pain with movement, can't bear too much weight on it, but is able to walk still, also reports there are bumps that have come up where all the bruising is. Pt denies any fever or purulent drainage from the cut/scrape she got from falling on the concrete.   Recommendation: Urgent Care

## 2025-04-23 NOTE — TELEPHONE ENCOUNTER
Pt reports fell on last Thursday night, fell onto her Rt knee, but it has continued to be swollen and bruised, now has bumps where there is bruising on side of the knee and going down her navarrete. Pt denies any fever or purulent drainage in the cut she got from falling on the concrete, but does have pain when she tries to walk or bear weight on it. Pt advised to go to the ED now (or office with PCP approval) per protocol, pt was referred to [facility] and Dr. Madden advised that pt may be seen thru an . Pt informed and encouraged to call back with any worsening symptoms or questions. She verbalized understanding.        Reason for Disposition   Can't stand (bear weight) or walk    Additional Information   Negative: Major bleeding (actively dripping or spurting) that can't be stopped   Negative: Bullet, stabbed by knife or other serious penetrating wound   Negative: Looks like a dislocated joint (crooked or deformed)   Negative: Serious injury with multiple fractures (broken bones)   Negative: Sounds like a life-threatening emergency to the triager    Protocols used: Knee Injury-A-[place]

## 2025-04-23 NOTE — PROGRESS NOTES
"Patient spoke with [facility] RN on 4/23/2025 with complaint of "pt that fell last Thursday and now reports worsening swelling and pain with movement, can't bear too much weight on it, but is able to walk still, also reports there are bumps that have come up where all the bruising is. Pt denies any fever or purulent drainage from the cut/scrape she got from falling on the concrete."    OOC RN consulted with [facility] provider, Dr. Madden, and disposition recommended was urgent care.  Will follow up with patient to assess if patient has any additional concerns to be addressed.   "

## 2025-04-25 ENCOUNTER — PATIENT OUTREACH (OUTPATIENT)
Facility: OTHER | Age: 65
End: 2025-04-25
Payer: MEDICARE

## 2025-04-25 NOTE — PROGRESS NOTES
Patient outreached on today to assess if she received the care she was needing and if have any additional concerns/needs to be addressed.  Left patient a voicemail for call return.  Will assist as needed in the event patient returns call.

## 2025-05-02 ENCOUNTER — HOSPITAL ENCOUNTER (OUTPATIENT)
Dept: RADIOLOGY | Facility: HOSPITAL | Age: 65
Discharge: HOME | End: 2025-05-02
Attending: STUDENT IN AN ORGANIZED HEALTH CARE EDUCATION/TRAINING PROGRAM
Payer: MEDICARE

## 2025-05-02 ENCOUNTER — HOSPITAL ENCOUNTER (EMERGENCY)
Facility: HOSPITAL | Age: 65
Discharge: HOME | End: 2025-05-02
Attending: EMERGENCY MEDICINE
Payer: MEDICARE

## 2025-05-02 VITALS
TEMPERATURE: 98 F | HEART RATE: 78 BPM | WEIGHT: 222.69 LBS | OXYGEN SATURATION: 99 % | HEIGHT: 67 IN | RESPIRATION RATE: 18 BRPM | SYSTOLIC BLOOD PRESSURE: 139 MMHG | BODY MASS INDEX: 34.95 KG/M2 | DIASTOLIC BLOOD PRESSURE: 89 MMHG
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
## 2025-05-02 DIAGNOSIS — M25.561 PAIN AND SWELLING OF RIGHT KNEE: Primary | ICD-10-CM

## 2025-05-02 DIAGNOSIS — M85.851 OSTEOPENIA OF RIGHT HIP: ICD-10-CM

## 2025-05-02 DIAGNOSIS — M25.461 PAIN AND SWELLING OF RIGHT KNEE: Primary | ICD-10-CM

## 2025-05-02 DIAGNOSIS — M85.80 OSTEOPENIA, UNSPECIFIED LOCATION: ICD-10-CM

## 2025-05-02 PROCEDURE — 99284 EMERGENCY DEPT VISIT MOD MDM: CPT | Mod: 25

## 2025-05-02 PROCEDURE — 77080 DXA BONE DENSITY AXIAL: CPT | Mod: TC

## 2025-05-02 RX ORDER — DICLOFENAC SODIUM 75 MG/1
75 TABLET, DELAYED RELEASE ORAL 2 TIMES DAILY
Qty: 14 TABLET | Refills: 0 | Status: SHIPPED | OUTPATIENT
Start: 2025-05-02 | End: 2025-05-09

## 2025-05-02 RX ORDER — GABAPENTIN 600 MG/1
600 TABLET ORAL 3 TIMES DAILY
Qty: 21 TABLET | Refills: 0 | Status: SHIPPED | OUTPATIENT
Start: 2025-05-02 | End: 2025-05-09

## 2025-05-02 RX ORDER — PREDNISONE 10 MG/1
10 TABLET ORAL DAILY
Qty: 5 TABLET | Refills: 0 | Status: SHIPPED | OUTPATIENT
Start: 2025-05-02 | End: 2025-05-07

## 2025-05-02 NOTE — ED PROVIDER NOTES
Encounter Date: 2025       History     Chief Complaint   Patient presents with    Knee Pain     Pt reports right sided knee pain after falling 2 weeks ago. Pt is able to bear weight.      A 64 y.o. female patient with a history of asthma, HTN, hypothyroidism, RA, fibromyalgia presents to the ED with right knee pain and swelling. The onset is 2 weeks ago after falling down the stairs onto concrete floor at [employer].  Patient states she is able to bear weight but that she has pain when bending her knee.  Patient states she went to an outside facility and they told her it was arthritis and discharged her.  Denies any numbness.      The history is provided by the patient.     Review of patient's allergies indicates:  No Known Allergies  Past Medical History:   Diagnosis Date    Asthma     Breast disorder     Fibromyalgia     Graves' disease     Herpes simplex virus (HSV) infection     Hypertension     Hypothyroid     Insomnia     Mediastinal mass     Postoperative hypothyroidism     Rheumatoid arthritis     Seropositive rheumatoid arthritis     Thymoma      Past Surgical History:   Procedure Laterality Date    BILATERAL TUBAL LIGATION      BREAST BIOPSY      BREAST SURGERY      CARDIAC CATHETERIZATION       SECTION      DILATION AND CURETTAGE OF UTERUS      ESOPHAGOGASTRODUODENOSCOPY      HYSTEROSCOPY WITH DILATION AND CURETTAGE OF UTERUS N/A 2024    Procedure: HYSTEROSCOPY, WITH DILATION AND CURETTAGE OF UTERUS;  Surgeon: Elzbieta Lares MD;  Location: [facility];  Service: OB/GYN;  Laterality: N/A;  *babyscope*    MEDIASTINAL MASS EXCISION      MOUTH SURGERY      had bottom teeth pulled    MULTIPLE TOOTH EXTRACTIONS      All teeth now removed    THYROIDECTOMY, PARTIAL      TUBAL LIGATION       Family History   Problem Relation Name Age of Onset    Hypertension Mother [facility]     Diabetes Mother [facility]     Breast cancer Mother [facility] 45        bilateral    Lung cancer Father       Diabetes Daughter Deborah Harris     Cervical cancer Daughter Deborah Harris 27    Lung cancer Maternal Uncle       Social History[1]  Review of Systems   Constitutional:  Negative for chills and fever.   Eyes:  Negative for visual disturbance.   Respiratory:  Negative for shortness of breath.    Cardiovascular:  Negative for chest pain.   Gastrointestinal:  Negative for nausea and vomiting.   Genitourinary:  Negative for difficulty urinating and dysuria.   Musculoskeletal:  Positive for arthralgias, joint swelling and myalgias.   Skin:  Negative for color change and rash.   Neurological:  Negative for weakness and headaches.   Hematological:  Does not bruise/bleed easily.   Psychiatric/Behavioral:  Negative for confusion.    All other systems reviewed and are negative.      Physical Exam     Initial Vitals [05/02/25 1056]   BP Pulse Resp Temp SpO2   128/88 88 18 98.4 °F (36.9 °C) 100 %      MAP       --         Physical Exam    Nursing note and vitals reviewed.  Constitutional: She appears well-developed and well-nourished.   HENT:   Head: Normocephalic and atraumatic.   Right Ear: External ear normal.   Left Ear: External ear normal.   Nose: Nose normal. Mouth/Throat: Oropharynx is clear and moist.   Eyes: Conjunctivae and EOM are normal.   Neck: Neck supple.   Cardiovascular:  Normal rate, regular rhythm and normal heart sounds.     Exam reveals no gallop and no friction rub.       No murmur heard.  Pulmonary/Chest: Breath sounds normal. No respiratory distress. She has no wheezes. She has no rhonchi. She has no rales.   Musculoskeletal:      Cervical back: Neck supple.      Right knee: Swelling, bony tenderness and crepitus present. No deformity, ecchymosis or lacerations. Normal range of motion. Tenderness present over the medial joint line. Normal alignment, normal meniscus and normal patellar mobility. Normal pulse.        Legs:      Neurological: She is alert and oriented to person, place, and time. GCS  score is 15. GCS eye subscore is 4. GCS verbal subscore is 5. GCS motor subscore is 6.   Skin: Skin is warm and dry. Capillary refill takes less than 2 seconds.         ED Course   Procedures  Labs Reviewed - No data to display       Imaging Results              X-Ray Knee 3 View Right (Final result)  Result time 05/02/25 13:17:36      Final result by Felix Jacobs MD (05/02/25 13:17:36)                   Impression:      No acute findings.      Electronically signed by: Felix Jacobs  Date:    05/02/2025  Time:    13:17               Narrative:    EXAMINATION:  XR KNEE 3 VIEW RIGHT    CLINICAL HISTORY:  Unspecified fall, initial encounter    COMPARISON:  None    FINDINGS:  Three views of the right knee.  There is no fracture, dislocation or sizable joint effusion.  There are degenerative changes with osteophytosis and moderate joint space narrowing in the medial compartment.                                       Medications - No data to display  Medical Decision Making  A 64 y.o. female patient with a history of asthma, HTN, hypothyroidism, RA, fibromyalgia presents to the ED with right knee pain and swelling. The onset is 2 weeks ago after falling down the stairs onto concrete floor at [employer].  Patient states she is able to bear weight but that she has pain when bending her knee.  Patient states she went to an outside facility and they told her it was arthritis and discharged her.  Denies any numbness.      X-ray unremarkable.  Patient to follow up with Orthopedics.  Ace wrap placed.    Clinical impression:  Fall  Pain and swelling of right knee (Primary)    Patient is non-toxic appearing and tolerating nutritional intake. Patient's vital signs and clinical condition are stable for DC with ED Prescriptions     Medication Sig Dispense Start Date End Date Auth. Provider    gabapentin (NEURONTIN) 600 MG tablet Take 1 tablet (600 mg total) by   mouth 3 (three) times daily. for 7 days 21 tablet 5/2/2025 5/9/2025    Ernestina Francois PA    predniSONE (DELTASONE) 10 MG tablet Take 1 tablet (10 mg total) by mouth   once daily. for 5 days 5 tablet 5/2/2025 5/7/2025 Ernestina Francois PA    diclofenac (VOLTAREN) 75 MG EC tablet Take 1 tablet (75 mg total) by   mouth 2 (two) times daily. for 7 days 14 tablet 5/2/2025 5/9/2025 Ernestina Francois PA         Follow-up: PCP or Internal medicine clinic within 3 days  Referrals made:  Orthopedics    Strict follow-up precautions given. Patient verbalizes understanding of treatment plan and ED return precautions.         Amount and/or Complexity of Data Reviewed  Radiology: ordered. Decision-making details documented in ED Course.    Risk  Prescription drug management.  Risk Details: Given strict ED return precautions. I have spoken with the patient and/or caregivers. I have explained the patient's condition, diagnoses and treatment plan based on the information available to me at this time. I have answered the patient's and/or caregiver's questions and addressed any concerns. The patient and/or caregivers have as good an understanding of the patient's diagnosis, condition and treatment plan as can be expected at this point. The patient's condition is stable and appropriate for discharge from the emergency department.      The patient will pursue further outpatient evaluation with the primary care physician or other designated or consulting physician as outlined in the discharge instructions. The patient and/or caregivers are agreeable to this plan of care and follow-up instructions have been explained in detail. The patient and/or caregivers have received these instructions in written format and have expressed an understanding of the discharge instructions. The patient and/or caregivers are aware that any significant change in condition or worsening of symptoms should prompt an immediate return to this or the closest emergency department or a call to 911.               Additional MDM:    Differential Diagnosis:   Other: The following diagnoses were also considered and will be evaluated: Contusion, Strain and Sprain.            ED Course as of 05/02/25 1925   Fri May 02, 2025   1323 X-Ray Knee 3 View Right  No acute findings.   [AG]      ED Course User Index  [AG] Ernestina Francois PA                           Clinical Impression:  Final diagnoses:  [W19.XXXA] Fall  [M25.561, M25.461] Pain and swelling of right knee (Primary)          ED Disposition Condition    Discharge Stable          ED Prescriptions       Medication Sig Dispense Start Date End Date Auth. Provider    gabapentin (NEURONTIN) 600 MG tablet Take 1 tablet (600 mg total) by mouth 3 (three) times daily. for 7 days 21 tablet 5/2/2025 5/9/2025 Ernestina Francois PA    predniSONE (DELTASONE) 10 MG tablet Take 1 tablet (10 mg total) by mouth once daily. for 5 days 5 tablet 5/2/2025 5/7/2025 Ernestina Francois PA    diclofenac (VOLTAREN) 75 MG EC tablet Take 1 tablet (75 mg total) by mouth 2 (two) times daily. for 7 days 14 tablet 5/2/2025 5/9/2025 Ernestina Francois PA          Follow-up Information       Follow up With Specialties Details Why Contact Info    [facility] Emergency Medicine Go to  If symptoms worsen, As needed [address] [facility] [address] [phone]    Laura Chin, DO Internal Medicine In 3 days  [address] [address] [phone]      [facility] Orthopedics Call in 3 days to schedule an appointment. [address] [facility] [address] [phone]               [1]   Social History  Tobacco Use    Smoking status: Never    Smokeless tobacco: Never   Substance Use Topics    Alcohol use: Not Currently     Alcohol/week: 1.0 standard drink of alcohol     Comment: ocassionally    Drug use: Never        Ernestina Francois PA  05/02/25 1925

## 2025-06-17 ENCOUNTER — OFFICE VISIT (OUTPATIENT)
Dept: CARDIOLOGY | Facility: CLINIC | Age: 65
End: 2025-06-17
Payer: MEDICARE

## 2025-06-17 VITALS
BODY MASS INDEX: 36.42 KG/M2 | RESPIRATION RATE: 20 BRPM | DIASTOLIC BLOOD PRESSURE: 71 MMHG | WEIGHT: 226.63 LBS | HEART RATE: 79 BPM | TEMPERATURE: 98 F | SYSTOLIC BLOOD PRESSURE: 137 MMHG | HEIGHT: 66 IN | OXYGEN SATURATION: 100 %

## 2025-06-17 DIAGNOSIS — I10 PRIMARY HYPERTENSION: ICD-10-CM

## 2025-06-17 DIAGNOSIS — R42 LIGHTHEADEDNESS: Primary | ICD-10-CM

## 2025-06-17 DIAGNOSIS — R07.9 CHEST PAIN, UNSPECIFIED TYPE: ICD-10-CM

## 2025-06-17 DIAGNOSIS — R53.83 FATIGUE, UNSPECIFIED TYPE: ICD-10-CM

## 2025-06-17 DIAGNOSIS — E66.01 MORBID OBESITY: ICD-10-CM

## 2025-06-17 PROCEDURE — 99215 OFFICE O/P EST HI 40 MIN: CPT | Mod: PBBFAC | Performed by: INTERNAL MEDICINE

## 2025-06-17 NOTE — PROGRESS NOTES
"  CHIEF COMPLAINT:   Chief Complaint   Patient presents with    f/u states has some chest discomfort at times no sob no que                   Review of patient's allergies indicates:  No Known Allergies                                       HPI:  Mary Lou Perez 64 y.o. female with PMH of HTN, RA, Graves disease s/p lobectomy and hypothyroidism, GERD previously seen by [facility] once in 2019 that presents to Cardiology clinic for follow-up of chest pain.    Endorses improvement in frequency of chest pain since last visit, about 1-2 times per month. Lasting very briefly, doesn't require nitro. Does have chronic NGUYEN, not worsening in nature. Unable to lay flat but complicated by h/o gastritis. Able to do ADL but endorses she has to take multiple breaks possibly d/t combination of NGUYEN and RA.    Does reports she has been having more fatigue episode along with lightheadedness lately for the past month, requiring rest/nap throughout the day. Does endorse she sometimes snores at night.    Reports history of GERD followed by GI.  Reports a remote history of "mild MI" in 1997 after which she was followed by [facility]. Also reports "mini stroke" in 2003.   Previous Stress Test and SABRINA at [facility]. Does not recall results.  Family history of MI in her mother and stroke history in her maternal grandmother.  She denies any tobacco ETOH or drug use.                                                                                                                                                                                                                                                                                                                                                                                                                                                                                        CARDIAC TESTING:      Results for orders placed during the hospital encounter of 01/16/25    Echo  Interpretation Summary    Left " Ventricle: The left ventricle is normal in size. Ventricular mass is normal. Mildly increased wall thickness. There is concentric remodeling. Normal wall motion. There is low normal systolic function with a visually estimated ejection fraction of 50 - 55%. Quantitated ejection fraction is 53%. Grade I diastolic dysfunction.    Right Ventricle: Normal right ventricular cavity size. Systolic function is normal.    Left Atrium: Normal left atrial size.    Right Atrium: Normal right atrial size.  Prominent venous inflow of unclear etiology. Pt with known history of mediastinal mass excision.    Aortic Valve: There is mild aortic valve sclerosis. There is no stenosis. There is no significant regurgitation.    Mitral Valve: The mitral valve is structurally normal. There is no stenosis. There is trace regurgitation.    Tricuspid Valve: There is trace regurgitation.    Pulmonic Valve: There is trace regurgitation.    Aorta: Aortic root is normal in size measuring 3.0 cm.    Pulmonary Artery: No pulmonary hypertension. The estimated pulmonary artery systolic pressure is 29 mmHg.    IVC/SVC: Intermediate venous pressure at 8 mmHg.    Pericardium: There is no pericardial effusion.     30-Day Outpatient Telemetry  Underlying rhythm:   Sinus  Pauses:  No significant pause noted  Symptoms:  No symptoms reported.   Events:  During patient activated events  the patient is in sinus rhythm with HR 70 to 112  Arrhythmia:  No significant arrhythmia noted       Patient Active Problem List   Diagnosis    Caries    Fibromyalgia    Insomnia    Seropositive rheumatoid arthritis    GERD (gastroesophageal reflux disease)    Screening for colon cancer    Chronic constipation    Arthritis    Thymoma    H. pylori infection    PMB (postmenopausal bleeding)    Cough    Status post hysteroscopy    Chest pain    Primary hypertension    Fatigue    Morbid obesity     Past Surgical History:   Procedure Laterality Date    BILATERAL TUBAL LIGATION       BREAST BIOPSY      BREAST SURGERY      CARDIAC CATHETERIZATION       SECTION      DILATION AND CURETTAGE OF UTERUS      ESOPHAGOGASTRODUODENOSCOPY      HYSTEROSCOPY WITH DILATION AND CURETTAGE OF UTERUS N/A 2024    Procedure: HYSTEROSCOPY, WITH DILATION AND CURETTAGE OF UTERUS;  Surgeon: Elzbieta Lares MD;  Location: [facility];  Service: OB/GYN;  Laterality: N/A;  *babyscope*    MEDIASTINAL MASS EXCISION      MOUTH SURGERY      had bottom teeth pulled    MULTIPLE TOOTH EXTRACTIONS      All teeth now removed    THYROIDECTOMY, PARTIAL      TUBAL LIGATION       Social History     Socioeconomic History    Marital status:      Spouse name: Rolando    Number of children: 4   Occupational History    Occupation: disabled   Tobacco Use    Smoking status: Never    Smokeless tobacco: Never   Substance and Sexual Activity    Alcohol use: Not Currently     Alcohol/week: 1.0 standard drink of alcohol     Comment: ocassionally    Drug use: Never    Sexual activity: Yes     Partners: Male     Birth control/protection: None     Social Drivers of Health     Financial Resource Strain: Low Risk  (2024)    Overall Financial Resource Strain (CARDIA)     Difficulty of Paying Living Expenses: Not hard at all   Food Insecurity: No Food Insecurity (2024)    Hunger Vital Sign     Worried About Running Out of Food in the Last Year: Never true     Ran Out of Food in the Last Year: Never true   Transportation Needs: High Risk (2024)    Received from [facility] SDOH Screening     Has lack of transportation kept you from medical appointments, meetings, work or from getting things needed for daily living? choose all that apply.: Yes, it has kept me from medical appointments or from getting my medications   Physical Activity: Inactive (2024)    Exercise Vital Sign     Days of Exercise per Week: 0 days     Minutes of Exercise per Session: 0 min   Stress: No Stress Concern Present (2024)     [facility] of Occupational Health - Occupational Stress Questionnaire     Feeling of Stress : Not at all   Housing Stability: Low Risk  (6/23/2022)    Housing Stability Vital Sign     Unable to Pay for Housing in the Last Year: No     Number of Places Lived in the Last Year: 1     Unstable Housing in the Last Year: No        Family History   Problem Relation Name Age of Onset    Hypertension Mother Bijal reddy     Diabetes Mother Bijal reddy     Breast cancer Mother Bijal reddy 45        bilateral    Lung cancer Father      Diabetes Daughter Deborah Harris     Cervical cancer Daughter Deborah Harris 27    Lung cancer Maternal Uncle           Current Outpatient Medications:     albuterol (PROVENTIL/VENTOLIN HFA) 90 mcg/actuation inhaler, Inhale 2 puffs into the lungs every 6 (six) hours as needed for Shortness of Breath., Disp: 18 g, Rfl: 2    ascorbic acid, vitamin C, (VITAMIN C) 500 MG tablet, Take 500 mg by mouth once daily., Disp: , Rfl:     cyanocobalamin, vitamin B-12, (VITAMIN B-12) 2,500 mcg Subl, Place 1 tablet under the tongue Daily., Disp: 90 each, Rfl: 3    fluticasone propionate (FLONASE) 50 mcg/actuation nasal spray, 1 spray by Each Nostril route Daily., Disp: , Rfl:     fluticasone-salmeterol diskus inhaler 500-50 mcg, Inhale 1 puff into the lungs 2 (two) times daily. Controller, Disp: 60 each, Rfl: 2    folic acid (FOLVITE) 1 MG tablet, Take 1 tablet (1,000 mcg total) by mouth once daily., Disp: 90 tablet, Rfl: 3    gabapentin (NEURONTIN) 400 MG capsule, Take 400 mg by mouth 3 (three) times daily., Disp: , Rfl:     HUMIRA,CF, PEN 40 mg/0.4 mL PnKt, Inject 0.4 mLs (40 mg total) into the skin once a week., Disp: 4 pen, Rfl: 11    hydroxychloroquine (PLAQUENIL) 200 mg tablet, Take 1 tablet (200 mg total) by mouth 2 (two) times daily., Disp: 180 tablet, Rfl: 3    levothyroxine (SYNTHROID) 125 MCG tablet, Take 1 tablet (125 mcg total) by mouth once daily., Disp: 90 tablet, Rfl: 2     "meclizine (ANTIVERT) 25 mg tablet, Take 1 tablet (25 mg total) by mouth 3 (three) times daily as needed., Disp: 30 tablet, Rfl: 2    methotrexate 2.5 MG Tab, TAKE 3 TABLETS BY MOUTH AFTER LUNCH AND TAKE 3 TABLETS AFTER SUPPER ON TUESDAY S, Disp: 90 tablet, Rfl: 3    NIFEdipine (PROCARDIA-XL) 30 MG (OSM) 24 hr tablet, Take 1 tablet (30 mg total) by mouth once daily., Disp: 30 tablet, Rfl: 11    nitroGLYCERIN (NITROSTAT) 0.4 MG SL tablet, Place 1 tablet (0.4 mg total) under the tongue every 5 (five) minutes as needed for Chest pain., Disp: 25 tablet, Rfl: 1    omeprazole (PRILOSEC) 40 MG capsule, Take 1 capsule (40 mg total) by mouth every morning., Disp: 90 capsule, Rfl: 3    tiZANidine (ZANAFLEX) 4 MG tablet, TAKE 2 TABLETS BY MOUTH ONCE DAILY AT BEDTIME, Disp: , Rfl:     triamterene-hydrochlorothiazide 37.5-25 mg (MAXZIDE-25) 37.5-25 mg per tablet, Take 1 tablet by mouth once daily., Disp: 90 tablet, Rfl: 2    valACYclovir (VALTREX) 500 MG tablet, Take 1 tablet (500 mg total) by mouth every evening., Disp: 30 tablet, Rfl: 11    zolpidem (AMBIEN) 10 mg Tab, Take 10 mg by mouth nightly as needed., Disp: , Rfl:     diclofenac sodium (VOLTAREN) 1 % Gel, SMARTSI Gram(s) Topical 3 Times Daily, Disp: , Rfl:     HYDROcodone-acetaminophen (NORCO)  mg per tablet, Take 1 tablet by mouth every 8 (eight) hours as needed for Pain., Disp: 90 tablet, Rfl: 0    methylPREDNISolone (MEDROL DOSEPACK) 4 mg tablet, Take by mouth. (Patient not taking: Reported on 2025), Disp: , Rfl:     predniSONE (DELTASONE) 5 MG tablet, Take 5 mg by mouth daily as needed. (Patient not taking: Reported on 2025), Disp: , Rfl:      ROS:                                                                                                                                                                             As above    Blood pressure 137/71, pulse 79, temperature 98.2 °F (36.8 °C), temperature source Oral, resp. rate 20, height 5' 6" " (1.676 m), weight 102.8 kg (226 lb 9.6 oz), SpO2 100%.     PE:  General: Awake, alert, & oriented to person, place & time. No acute distress  Psychiatric: Mood and affect normal  HEENT: Normocephalic, atraumatic. Symmetric  Cardiovascular: Regular rate & rhythm, Normal S1 & S2 w/out murmurs, rubs or gallops, No JVD appreciated, Negative hepatojugular reflex, 2+ pulses throughout  Pulmonary: Bilateral symmetric chest rise, Non-labored, no wheezes, rhonchi or crackles are appreciated  Abdominal:  Soft and nondistended  Extremities: No clubbing, cyanosis. trace B/L Le edema.  Skin:  Exposed skin is warm & dry.  Neuro:   Patient moves all extremities equally. Sensation intact bilateraly.       ASSESSMENT/PLAN:    Fatigue/Lightheadedness  CP/Palpitations - Improved  Self reported CAD  Self reported h/o CHF  HTN  HLD    -Chest pain: Typical angina. Showing little improvement but not progressive  -Edema: Stable trace bilateral LE pitting EDEMA  -SOB/NGUYEN: DASI: 4.70 METS. No rales on exam. No signs of overt hypervolemia  -Echo: G1DD, low normal systolic dysfunction with concentric remodeling consistent with chronic hypertension without significant valvular pathology. EF 50-55%  -30-day event monitoring (12/2024) demonstrates normal sinus rhythm without arrhythmic events, blocks, or pauses  BP not at goal  -NST shows no evidence of ischemia or infarction  -/71. LDL 70 on 04/2022  -Continue Procardia 30 mg daily with NTG SL prn  -Given new onset of fatigue and lightheadedness, will repeat 30-day monitor  -Patient will benefit from sleep study for LETY evaluation      30d monitor  RTC in 4mo    Li Gonzales DO  [facility] Internal Medicine PGY-1

## 2025-06-17 NOTE — PROGRESS NOTES
"Cardiology Attending  2025 3:34 PM    I evaluated Mary Lou Perez in Cardiology Clinic and discussed the patient's symptoms, findings, and management plan with the resident.   Ms. Mary Lou Perez is a 64 y.o. female.  The patient is seen in cardiology clinic for HTN, HLD, palpitations.    Blood pressure 137/71, pulse 79, temperature 98.2 °F (36.8 °C), temperature source Oral, resp. rate 20, height 5' 6" (1.676 m), weight 102.8 kg (226 lb 9.6 oz), SpO2 100%.  The patient is in no apparent distress.      Current Outpatient Medications   Medication Instructions    albuterol (PROVENTIL/VENTOLIN HFA) 90 mcg/actuation inhaler 2 puffs, Inhalation, Every 6 hours PRN    ascorbic acid (vitamin C) (VITAMIN C) 500 mg, Daily    cyanocobalamin, vitamin B-12, (VITAMIN B-12) 2,500 mcg Subl 1 tablet, Sublingual, Daily    diclofenac sodium (VOLTAREN) 1 % Gel SMARTSI Gram(s) Topical 3 Times Daily    fluticasone propionate (FLONASE) 50 mcg/actuation nasal spray 1 spray, Daily    fluticasone-salmeterol diskus inhaler 500-50 mcg 1 puff, Inhalation, 2 times daily, Controller    folic acid (FOLVITE) 1,000 mcg, Oral, Daily    gabapentin (NEURONTIN) 400 mg, 3 times daily    HUMIRA(CF) PEN 40 mg, Subcutaneous, Weekly    HYDROcodone-acetaminophen (NORCO)  mg per tablet 1 tablet, Oral, Every 8 hours PRN    hydroxychloroquine (PLAQUENIL) 200 mg, Oral, 2 times daily    levothyroxine (SYNTHROID) 125 mcg, Oral, Daily    meclizine (ANTIVERT) 25 mg, Oral, 3 times daily PRN    methotrexate 2.5 MG Tab TAKE 3 TABLETS BY MOUTH AFTER LUNCH AND TAKE 3 TABLETS AFTER SUPPER ON TUESDAY S    methylPREDNISolone (MEDROL DOSEPACK) 4 mg tablet Take by mouth.    NIFEdipine (PROCARDIA-XL) 30 mg, Oral, Daily    nitroGLYCERIN (NITROSTAT) 0.4 mg, Sublingual, Every 5 min PRN    omeprazole (PRILOSEC) 40 mg, Oral, Every morning    predniSONE (DELTASONE) 5 mg, Daily PRN    tiZANidine (ZANAFLEX) 4 MG tablet TAKE 2 TABLETS BY MOUTH ONCE DAILY AT " BEDTIME    triamterene-hydrochlorothiazide 37.5-25 mg (MAXZIDE-25) 37.5-25 mg per tablet 1 tablet, Oral, Daily    valACYclovir (VALTREX) 500 mg, Oral, Nightly    zolpidem (AMBIEN) 10 mg, Nightly PRN       Results for orders placed during the hospital encounter of 01/16/25    Echo    Interpretation Summary    Left Ventricle: The left ventricle is normal in size. Ventricular mass is normal. Mildly increased wall thickness. There is concentric remodeling. Normal wall motion. There is low normal systolic function with a visually estimated ejection fraction of 50 - 55%. Quantitated ejection fraction is 53%. Grade I diastolic dysfunction.    Right Ventricle: Normal right ventricular cavity size. Systolic function is normal.    Left Atrium: Normal left atrial size.    Right Atrium: Normal right atrial size.  Prominent venous inflow of unclear etiology. Pt with known history of mediastinal mass excision.    Aortic Valve: There is mild aortic valve sclerosis. There is no stenosis. There is no significant regurgitation.    Mitral Valve: The mitral valve is structurally normal. There is no stenosis. There is trace regurgitation.    Tricuspid Valve: There is trace regurgitation.    Pulmonic Valve: There is trace regurgitation.    Aorta: Aortic root is normal in size measuring 3.0 cm.    Pulmonary Artery: No pulmonary hypertension. The estimated pulmonary artery systolic pressure is 29 mmHg.    IVC/SVC: Intermediate venous pressure at 8 mmHg.    Pericardium: There is no pericardial effusion.    Results for orders placed during the hospital encounter of 03/17/25    Nuclear Stress - Cardiology Interpreted    Interpretation Summary    Normal myocardial perfusion scan. There is no evidence of myocardial ischemia or infarction.    The gated perfusion images showed an ejection fraction of 55% at rest. The gated perfusion images showed an ejection fraction of 71% post stress.    There is normal wall motion at rest and post-stress.     "LV cavity size is normal at rest and normal at post-stress.    The ECG portion of the study is negative for ischemia.    There were no arrhythmias during stress.    No results found for this or any previous visit.      Lab Results   Component Value Date    CHOL 146 04/07/2022      Lab Results   Component Value Date    HGB 13.3 05/23/2024      Lab Results   Component Value Date    CREATININE 0.85 05/23/2024      No results found for: "BNP"       The plan is to repeat the 30 day monitor since patient has episodes where she feels very lethargic and needs to sleep for 15 minutes or so and then she feels better.  Last time the 30 day monitor was done she did not have a spell of these symptoms during the study.  However her spells are getting more frequent thus we will try to catch the episode on a 30 day monitor.  Currently they are occurring more than once a month.    Reginaldo García MD   "

## 2025-06-25 ENCOUNTER — HOSPITAL ENCOUNTER (OUTPATIENT)
Dept: CARDIOLOGY | Facility: HOSPITAL | Age: 65
Discharge: HOME | End: 2025-06-25
Payer: MEDICARE

## 2025-06-25 DIAGNOSIS — R42 LIGHTHEADEDNESS: ICD-10-CM

## 2025-06-26 ENCOUNTER — RESULTS FOLLOW-UP (OUTPATIENT)
Dept: INTERNAL MEDICINE | Facility: CLINIC | Age: 65
End: 2025-06-26
Payer: MEDICARE

## 2025-06-26 RX ORDER — ALENDRONATE SODIUM 70 MG/1
70 TABLET ORAL
Qty: 12 TABLET | Refills: 3 | Status: SHIPPED | OUTPATIENT
Start: 2025-06-26

## 2025-06-26 NOTE — PROGRESS NOTES
Started pt on alendronate   70 weekly with osteoporosis  on    dxa  scan.   I instructed pt about side effects  as well

## 2025-07-11 ENCOUNTER — HOSPITAL ENCOUNTER (OUTPATIENT)
Dept: RADIOLOGY | Facility: HOSPITAL | Age: 65
Discharge: HOME | End: 2025-07-11
Attending: SURGERY
Payer: MEDICARE

## 2025-07-11 ENCOUNTER — OFFICE VISIT (OUTPATIENT)
Dept: ORTHOPEDICS | Facility: CLINIC | Age: 65
End: 2025-07-11
Payer: MEDICARE

## 2025-07-11 VITALS
SYSTOLIC BLOOD PRESSURE: 131 MMHG | OXYGEN SATURATION: 99 % | WEIGHT: 227.06 LBS | HEIGHT: 62 IN | TEMPERATURE: 98 F | BODY MASS INDEX: 41.78 KG/M2 | DIASTOLIC BLOOD PRESSURE: 74 MMHG | HEART RATE: 76 BPM

## 2025-07-11 DIAGNOSIS — M25.461 PAIN AND SWELLING OF RIGHT KNEE: ICD-10-CM

## 2025-07-11 DIAGNOSIS — M25.461 PAIN AND SWELLING OF RIGHT KNEE: Primary | ICD-10-CM

## 2025-07-11 DIAGNOSIS — M25.561 PAIN AND SWELLING OF RIGHT KNEE: ICD-10-CM

## 2025-07-11 DIAGNOSIS — M17.11 PRIMARY OSTEOARTHRITIS OF RIGHT KNEE: ICD-10-CM

## 2025-07-11 DIAGNOSIS — M25.561 PAIN AND SWELLING OF RIGHT KNEE: Primary | ICD-10-CM

## 2025-07-11 DIAGNOSIS — T14.8XXA BONE BRUISE: ICD-10-CM

## 2025-07-11 PROCEDURE — 73564 X-RAY EXAM KNEE 4 OR MORE: CPT | Mod: TC,RT

## 2025-07-11 PROCEDURE — 99215 OFFICE O/P EST HI 40 MIN: CPT | Mod: PBBFAC,25

## 2025-07-11 NOTE — PROGRESS NOTES
Faculty Attestation: Mary Loujoel Perez  was seen in Sports Medicine Clinic Patient seen and evaluated at the time of the visit. History of Present Illness, Physical Exam, and Assessment and Plan reviewed.     Treatment plan is reasonable and appropriate. Compliance with treatment recommendations is important.      Radiology images independently reviewed and agree with resident interpretation.     No procedure was performed.    Meliton Maya MD  Sports Medicine

## 2025-07-11 NOTE — PROGRESS NOTES
"Subjective:    Patient ID: Mary Lou Perez is a 64 y.o. female who presented to [facility] for consult requested by Dr. Ernestina Francois for knee pain.    Chief Complaint: Pain of the Right Knee    History of Present Illness:  Mary Lou Perez with a PMHx of RA presents to the clinic today for right knee pain for the past 2 months. Pain is located anterolaterally and ranks the pain a 2/10. Quality of pain is described as Aching. Inciting event: injured during a fall while at [employer]. Pain is aggravated by palpation. Patient has had prior knee problems. Evaluation to date: plain films and ER evaluation. Treatment to date: avoidance of activity, topical analgesics, and oral analgesics. Expectations for today's visit: reassurance. PCP: Arline Gazra MD.    Knee Review of Systems:  Swelling?  no  Instability?  no  Mechanical sx?  no  <30 min AM stiffness? no  Limited ROM? no  Fever/Chills? no    Comorbid Conditions:  Morbidly Obese    Objective:     Physical Exam:  /74 (Patient Position: Sitting)   Pulse 76   Temp 98.1 °F (36.7 °C)   Ht 5' 2" (1.575 m)   Wt 103 kg (227 lb 1.2 oz)   SpO2 99%   BMI 41.53 kg/m²     Appearance:  Normal gait/station - FWB  Alignment: Left: normal Right: normal   Soft tissue swelling: Left: no Right: no  Effusion: Left:  Negative Right: Negative  Erythema: Left no Right: no  Ecchymosis: Left: no Right: no  Atrophy: Left: no Right: no    Palpation:  Knee Tenderness: Left: Medial joint line and anterior knee superficially Right: None    Range of motion:  Flexion (140): Left:  120 limited to pain Right: 140  Extension (0): Left: 0 Right: 0    Strength:  Extension: Left 5/5  Pain: no     Right 5/5 Pain: no  Flexion: Left 5/5 Pain: no Right   5/5 Pain: no    Special Tests:  Ballotable Effusion:Left: Negative Right: Negative   Fluid Wave: Left: Negative Right: Negative   Crepitus: Left: Negative Right: Negative   Patellar grind " test: Left: Negative  Right: Negative  Apprehension test: Left: Negative Right: Negative   Varus: @ 0, Left Negative Right: Negative.  @ 30, Left Negative  Right Negative   Valgus: @ 0, Left Negative Right: Negative.  @ 30, Left Negative  Right Negative  Lachman: Left: Negative Right: Negative   Ant Drawer: Left: Negative Right: Negative   Posterior Drawer: Left: Negative Right: Negative   Noemy: Left: Negative Right: Negative       General appearance: NAD  Peripheral pulses: normal bilaterally   Reflexes: Left: normal Right normal   Sensation: normal    Labs:  Last A1c: The patient doesn't have any registry metric data available     Imaging:   Previous images reviewed.  X-rays ordered and performed today: yes  # of views: 4 Laterality: bilateral  My Interpretation:  shows DJD changes, likely chronic  L Knee: No fracture, dislocation, or effusion. Moderate osteophyte formation with moderate joint space narrowing in the medial compartment. Subchondral sclerosis. KL 3.    Assessment:     Encounter Diagnoses   Code Name Primary?    M25.561, M25.461 Pain and swelling of right knee Yes    T14.8XXA Bone bruise     M17.11 Primary osteoarthritis of right knee        Plan:      MDM: Prior external referring provider notes reviewed. Prior external referring provider studies reviewed.   Patient had a fall two months ago where she presented to the ED. No acute fracture was present but pain, swelling, and bruising was present, Patient was referred to [facility] for further evaluation. In two months since the patient's symptoms have been improving and she has minimal pain on ambulation. No acute abnormalities are present on imaging today. Possible bone bruising and acute arthritis flare that has improved contributed to her symptoms, which are controlled today. The patient should continue current therapy for her conditions, this one that should resolve on its own.  Dx: right Knee Osteoarthritis -  Acute moderate  exacerbation  Treatment Plan: Discussed with patient diagnosis and treatment recommendations. Recommend conservative treatment to include: avoidance of aggravating activity, significant modification of daily activities, hot/cold therapies, topical and oral medications, braces, HEP/PT/OT, and injections.   Imaging: radiological studies ordered and independently reviewed; discussed with patient; agree with radiologist interpretation.   Weight Management: is paramount. Recommend to discuss with PCP about medication and bariatric surgery options for weight loss if your BMI is >35 and applicable. A BMI of <24.9 may provide further relief.  Procedure: Discussed injections as treatment options; discussed injections as treatment options in future if conservative measures do not improve symptoms.  Activity: Activity as tolerated; HEP to include aerobic conditioning and strength training with non-painful activity. ROM/STG exercises. Proper footware; assistive devises to avoid limping.   Therapy: No formal therapy  Medication: CONTINUE over-the-counter acetaminophen (Tylenol 1000 mg three times per day as needed)  CONTINUE Voltaren Gel 1% as prescribed. Advised patient against taking combo narcotic pills containing acetaminophen with OTC Tylenol to prevent overdose / toxicity. Please see your primary care physician for further refills.  RTC: PRN; call if any issues.       This note is dictated using the M*Modal Fluency Direct word recognition program. There are word recognition mistakes that are occasionally missed on review.     Guillaume Plata MD  Internal Medicine, PGY-1

## (undated) DEVICE — MARKER WRITESITE SKIN CHLRAPRP

## (undated) DEVICE — PACK DRAPE PERI/GYN TIBURON

## (undated) DEVICE — PAD CURAD NONADH 3X4IN

## (undated) DEVICE — KIT SURGICAL TURNOVER

## (undated) DEVICE — GOWN POLY REINF BRTH SLV XL

## (undated) DEVICE — GLOVE SENSICARE PI GRN 7

## (undated) DEVICE — SPONGE GAUZE 16PLY 4X4

## (undated) DEVICE — GLOVE SIGNATURE ESSNTL LTX 6.5

## (undated) DEVICE — SPONGE LAP 18X18 PREWASHED

## (undated) DEVICE — CAP SELF SEAL GYNEOCLOGY F4

## (undated) DEVICE — SOL NACL IRR 3000ML

## (undated) DEVICE — GLOVE SENSICARE PI GRN 6

## (undated) DEVICE — JELLY SURGILUBE LUBE PKT 3GM

## (undated) DEVICE — GLOVE SENSICARE PI GRN 6.5

## (undated) DEVICE — GLOVE SIGNATURE MICRO LTX 6.5

## (undated) DEVICE — POSITIONER HEAD SUPINE PINK

## (undated) DEVICE — PAD PREP CUFFED NS 24X48IN

## (undated) DEVICE — DRAPE UNDERBUTTOCKS PCH STRL

## (undated) DEVICE — CATH RED RUBBER LATEX 14F 16IN

## (undated) DEVICE — GLOVE SIGNATURE ESSNTL LTX 6

## (undated) DEVICE — TRAY SKIN SCRUB WET PREMIUM

## (undated) DEVICE — SOLIDIFIER BOTTLE 1500CC

## (undated) DEVICE — TOWEL OR DISP STRL BLUE 4/PK

## (undated) DEVICE — PROTECTOR ONE-STEP ARM REG

## (undated) DEVICE — PACK FLUENT DISPOSABLE